# Patient Record
Sex: MALE | Race: BLACK OR AFRICAN AMERICAN | Employment: OTHER | URBAN - METROPOLITAN AREA
[De-identification: names, ages, dates, MRNs, and addresses within clinical notes are randomized per-mention and may not be internally consistent; named-entity substitution may affect disease eponyms.]

---

## 2021-03-19 ENCOUNTER — HOSPITAL ENCOUNTER (INPATIENT)
Age: 71
LOS: 6 days | Discharge: HOME HEALTH | DRG: 871 | End: 2021-03-25
Attending: STUDENT IN AN ORGANIZED HEALTH CARE EDUCATION/TRAINING PROGRAM | Admitting: INTERNAL MEDICINE
Payer: MEDICARE

## 2021-03-19 ENCOUNTER — HOSPITAL ENCOUNTER (EMERGENCY)
Age: 71
Discharge: ANOTHER HOSPITAL | End: 2021-03-19
Attending: EMERGENCY MEDICINE
Payer: MEDICARE

## 2021-03-19 ENCOUNTER — APPOINTMENT (OUTPATIENT)
Dept: GENERAL RADIOLOGY | Age: 71
End: 2021-03-19
Attending: EMERGENCY MEDICINE
Payer: MEDICARE

## 2021-03-19 VITALS
HEART RATE: 79 BPM | SYSTOLIC BLOOD PRESSURE: 175 MMHG | OXYGEN SATURATION: 100 % | TEMPERATURE: 98 F | DIASTOLIC BLOOD PRESSURE: 92 MMHG | RESPIRATION RATE: 26 BRPM

## 2021-03-19 DIAGNOSIS — J18.9 PNEUMONIA OF BOTH LUNGS DUE TO INFECTIOUS ORGANISM, UNSPECIFIED PART OF LUNG: ICD-10-CM

## 2021-03-19 DIAGNOSIS — I42.9 CARDIOMYOPATHY, UNSPECIFIED TYPE (HCC): ICD-10-CM

## 2021-03-19 DIAGNOSIS — R09.02 HYPOXIA: ICD-10-CM

## 2021-03-19 DIAGNOSIS — J96.01 ACUTE RESPIRATORY FAILURE WITH HYPOXIA (HCC): ICD-10-CM

## 2021-03-19 DIAGNOSIS — I50.9 CHRONIC CONGESTIVE HEART FAILURE, UNSPECIFIED HEART FAILURE TYPE (HCC): ICD-10-CM

## 2021-03-19 DIAGNOSIS — I50.9 CONGESTIVE HEART FAILURE, UNSPECIFIED HF CHRONICITY, UNSPECIFIED HEART FAILURE TYPE (HCC): ICD-10-CM

## 2021-03-19 DIAGNOSIS — I50.9 ACUTE ON CHRONIC CONGESTIVE HEART FAILURE, UNSPECIFIED HEART FAILURE TYPE (HCC): Primary | ICD-10-CM

## 2021-03-19 DIAGNOSIS — J81.0 ACUTE PULMONARY EDEMA (HCC): Primary | ICD-10-CM

## 2021-03-19 DIAGNOSIS — J18.9 COMMUNITY ACQUIRED PNEUMONIA, UNSPECIFIED LATERALITY: ICD-10-CM

## 2021-03-19 LAB
ALBUMIN SERPL-MCNC: 3.1 G/DL (ref 3.5–5)
ALBUMIN/GLOB SERPL: 0.6 {RATIO} (ref 1.1–2.2)
ALP SERPL-CCNC: 77 U/L (ref 45–117)
ALT SERPL-CCNC: 18 U/L (ref 12–78)
ANION GAP SERPL CALC-SCNC: 17 MMOL/L (ref 5–15)
APPEARANCE UR: CLEAR
ARTERIAL PATENCY WRIST A: ABNORMAL
AST SERPL W P-5'-P-CCNC: 18 U/L (ref 15–37)
BACTERIA URNS QL MICRO: ABNORMAL /HPF
BASE DEFICIT BLDA-SCNC: 9.9 MMOL/L (ref 0–2)
BASOPHILS # BLD: 0.1 K/UL (ref 0–0.2)
BASOPHILS NFR BLD: 1 % (ref 0–2.5)
BDY SITE: ABNORMAL
BILIRUB SERPL-MCNC: 0.5 MG/DL (ref 0.2–1)
BILIRUB UR QL: NEGATIVE
BNP SERPL-MCNC: ABNORMAL PG/ML
BREATHS.SPONTANEOUS ON VENT: 10
BUN SERPL-MCNC: 32 MG/DL (ref 6–20)
BUN/CREAT SERPL: 15 (ref 12–20)
CA-I BLD-MCNC: 9.2 MG/DL (ref 8.5–10.1)
CHLORIDE SERPL-SCNC: 106 MMOL/L (ref 97–108)
CO2 SERPL-SCNC: 17 MMOL/L (ref 21–32)
COHGB MFR BLD: 0.1 % (ref 0–5)
COLOR UR: ABNORMAL
COVID-19 RAPID TEST, COVR: NOT DETECTED
CREAT SERPL-MCNC: 2.19 MG/DL (ref 0.7–1.3)
EOSINOPHIL # BLD: 0.4 K/UL (ref 0–0.7)
EOSINOPHIL NFR BLD: 3 % (ref 0.9–2.9)
EPAP/CPAP/PEEP, PAPEEP: 10
ERYTHROCYTE [DISTWIDTH] IN BLOOD BY AUTOMATED COUNT: 16.9 % (ref 11.5–14.5)
FIO2 ON VENT: 80 %
GLOBULIN SER CALC-MCNC: 4.9 G/DL (ref 2–4)
GLUCOSE BLD STRIP.AUTO-MCNC: 178 MG/DL (ref 65–100)
GLUCOSE SERPL-MCNC: 297 MG/DL (ref 65–100)
GLUCOSE UR STRIP.AUTO-MCNC: NEGATIVE MG/DL
HCO3 BLDA-SCNC: 15 MMOL/L (ref 22–26)
HCT VFR BLD AUTO: 32.1 % (ref 41–53)
HGB BLD OXIMETRY-MCNC: 10.7 G/DL (ref 13.5–17.5)
HGB BLD-MCNC: 10 G/DL (ref 13.5–17.5)
HGB UR QL STRIP: ABNORMAL
INR PPP: 1.1 (ref 0.9–1.1)
IPAP/PIP, IPAPIP: 18
KETONES UR QL STRIP.AUTO: NEGATIVE MG/DL
LACTATE SERPL-SCNC: 1.4 MMOL/L (ref 0.4–2)
LACTATE SERPL-SCNC: 4.4 MMOL/L (ref 0.4–2)
LEUKOCYTE ESTERASE UR QL STRIP.AUTO: NEGATIVE
LYMPHOCYTES # BLD: 3.6 K/UL (ref 1–4.8)
LYMPHOCYTES NFR BLD: 21 % (ref 20.5–51.1)
MCH RBC QN AUTO: 24.2 PG (ref 31–34)
MCHC RBC AUTO-ENTMCNC: 31 G/DL (ref 31–36)
MCV RBC AUTO: 78.2 FL (ref 80–100)
METHGB MFR BLD: 0.3 % (ref 0–5)
MONOCYTES # BLD: 1.6 K/UL (ref 0.2–2.4)
MONOCYTES NFR BLD: 9 % (ref 1.7–9.3)
NEUTS SEG # BLD: 11.6 K/UL (ref 1.8–7.7)
NEUTS SEG NFR BLD: 66 % (ref 42–75)
NITRITE UR QL STRIP.AUTO: NEGATIVE
NRBC # BLD: 0.03 K/UL
NRBC BLD-RTO: 0.2 PER 100 WBC
OXYHGB MFR BLD: 95.2 % (ref 95–100)
PCO2 BLDA: 29 MMHG (ref 35–45)
PERFORMED BY, TECHID: ABNORMAL
PH BLDA: 7.33 [PH] (ref 7.35–7.45)
PH UR STRIP: 6 [PH] (ref 5–8)
PLATELET # BLD AUTO: 366 K/UL
PMV BLD AUTO: 9 FL (ref 6.5–11.5)
PO2 BLDA: 77 MMHG (ref 70–100)
POTASSIUM SERPL-SCNC: 4 MMOL/L (ref 3.5–5.1)
PRESSURE SUPPORT SETTING VENT: 8 CM[H2O]
PROT SERPL-MCNC: 8 G/DL (ref 6.4–8.2)
PROT UR STRIP-MCNC: 100 MG/DL
PROTHROMBIN TIME: 10.7 SEC (ref 9–11.1)
RBC # BLD AUTO: 4.11 M/UL (ref 4.5–5.9)
RBC #/AREA URNS HPF: ABNORMAL /HPF (ref 0–3)
SAO2% DEVICE SAO2% SENSOR NAME: ABNORMAL
SARS-COV-2, COV2: NORMAL
SODIUM SERPL-SCNC: 140 MMOL/L (ref 136–145)
SP GR UR REFRACTOMETRY: 1.02 (ref 1–1.03)
SPECIMEN SITE: ABNORMAL
SPECIMEN SOURCE: NORMAL
TROPONIN I SERPL-MCNC: <0.05 NG/ML
UROBILINOGEN UR QL STRIP.AUTO: 0.2 EU/DL (ref 0.2–1)
WBC # BLD AUTO: 17.4 K/UL (ref 4.4–11.3)
WBC URNS QL MICRO: ABNORMAL /HPF (ref 0–5)

## 2021-03-19 PROCEDURE — 83880 ASSAY OF NATRIURETIC PEPTIDE: CPT

## 2021-03-19 PROCEDURE — 82803 BLOOD GASES ANY COMBINATION: CPT

## 2021-03-19 PROCEDURE — 96372 THER/PROPH/DIAG INJ SC/IM: CPT

## 2021-03-19 PROCEDURE — 83605 ASSAY OF LACTIC ACID: CPT

## 2021-03-19 PROCEDURE — 74011250636 HC RX REV CODE- 250/636: Performed by: INTERNAL MEDICINE

## 2021-03-19 PROCEDURE — 36415 COLL VENOUS BLD VENIPUNCTURE: CPT

## 2021-03-19 PROCEDURE — 5A09357 ASSISTANCE WITH RESPIRATORY VENTILATION, LESS THAN 24 CONSECUTIVE HOURS, CONTINUOUS POSITIVE AIRWAY PRESSURE: ICD-10-PCS | Performed by: STUDENT IN AN ORGANIZED HEALTH CARE EDUCATION/TRAINING PROGRAM

## 2021-03-19 PROCEDURE — 74011000258 HC RX REV CODE- 258: Performed by: EMERGENCY MEDICINE

## 2021-03-19 PROCEDURE — 80053 COMPREHEN METABOLIC PANEL: CPT

## 2021-03-19 PROCEDURE — 87635 SARS-COV-2 COVID-19 AMP PRB: CPT

## 2021-03-19 PROCEDURE — 99284 EMERGENCY DEPT VISIT MOD MDM: CPT

## 2021-03-19 PROCEDURE — 85610 PROTHROMBIN TIME: CPT

## 2021-03-19 PROCEDURE — 81001 URINALYSIS AUTO W/SCOPE: CPT

## 2021-03-19 PROCEDURE — 96365 THER/PROPH/DIAG IV INF INIT: CPT

## 2021-03-19 PROCEDURE — 84484 ASSAY OF TROPONIN QUANT: CPT

## 2021-03-19 PROCEDURE — 96367 TX/PROPH/DG ADDL SEQ IV INF: CPT

## 2021-03-19 PROCEDURE — 74011250637 HC RX REV CODE- 250/637: Performed by: HOSPITALIST

## 2021-03-19 PROCEDURE — 74011250637 HC RX REV CODE- 250/637: Performed by: EMERGENCY MEDICINE

## 2021-03-19 PROCEDURE — 87040 BLOOD CULTURE FOR BACTERIA: CPT

## 2021-03-19 PROCEDURE — 74011250636 HC RX REV CODE- 250/636: Performed by: EMERGENCY MEDICINE

## 2021-03-19 PROCEDURE — 96375 TX/PRO/DX INJ NEW DRUG ADDON: CPT

## 2021-03-19 PROCEDURE — 74011000258 HC RX REV CODE- 258: Performed by: INTERNAL MEDICINE

## 2021-03-19 PROCEDURE — 65610000006 HC RM INTENSIVE CARE

## 2021-03-19 PROCEDURE — 99285 EMERGENCY DEPT VISIT HI MDM: CPT

## 2021-03-19 PROCEDURE — 85025 COMPLETE CBC W/AUTO DIFF WBC: CPT

## 2021-03-19 PROCEDURE — 93005 ELECTROCARDIOGRAM TRACING: CPT

## 2021-03-19 PROCEDURE — 71045 X-RAY EXAM CHEST 1 VIEW: CPT

## 2021-03-19 PROCEDURE — 82962 GLUCOSE BLOOD TEST: CPT

## 2021-03-19 PROCEDURE — 96374 THER/PROPH/DIAG INJ IV PUSH: CPT

## 2021-03-19 PROCEDURE — 36600 WITHDRAWAL OF ARTERIAL BLOOD: CPT

## 2021-03-19 RX ORDER — FUROSEMIDE 10 MG/ML
80 INJECTION INTRAMUSCULAR; INTRAVENOUS ONCE
Status: COMPLETED | OUTPATIENT
Start: 2021-03-19 | End: 2021-03-19

## 2021-03-19 RX ORDER — LEVOFLOXACIN 5 MG/ML
750 INJECTION, SOLUTION INTRAVENOUS EVERY 24 HOURS
Status: DISCONTINUED | OUTPATIENT
Start: 2021-03-19 | End: 2021-03-19 | Stop reason: DRUGHIGH

## 2021-03-19 RX ORDER — FUROSEMIDE 10 MG/ML
40 INJECTION INTRAMUSCULAR; INTRAVENOUS
Status: COMPLETED | OUTPATIENT
Start: 2021-03-19 | End: 2021-03-19

## 2021-03-19 RX ORDER — LEVOFLOXACIN 5 MG/ML
750 INJECTION, SOLUTION INTRAVENOUS
Status: DISCONTINUED | OUTPATIENT
Start: 2021-03-19 | End: 2021-03-22

## 2021-03-19 RX ORDER — ONDANSETRON 2 MG/ML
4 INJECTION INTRAMUSCULAR; INTRAVENOUS
Status: DISCONTINUED | OUTPATIENT
Start: 2021-03-19 | End: 2021-03-25 | Stop reason: HOSPADM

## 2021-03-19 RX ORDER — FUROSEMIDE 10 MG/ML
80 INJECTION INTRAMUSCULAR; INTRAVENOUS DAILY
Status: DISCONTINUED | OUTPATIENT
Start: 2021-03-20 | End: 2021-03-20

## 2021-03-19 RX ORDER — ENOXAPARIN SODIUM 100 MG/ML
40 INJECTION SUBCUTANEOUS DAILY
Status: DISCONTINUED | OUTPATIENT
Start: 2021-03-19 | End: 2021-03-25 | Stop reason: HOSPADM

## 2021-03-19 RX ORDER — SODIUM CHLORIDE 0.9 % (FLUSH) 0.9 %
5-40 SYRINGE (ML) INJECTION EVERY 8 HOURS
Status: DISCONTINUED | OUTPATIENT
Start: 2021-03-19 | End: 2021-03-25 | Stop reason: HOSPADM

## 2021-03-19 RX ORDER — SODIUM CHLORIDE 0.9 % (FLUSH) 0.9 %
5-40 SYRINGE (ML) INJECTION EVERY 8 HOURS
Status: DISCONTINUED | OUTPATIENT
Start: 2021-03-19 | End: 2021-03-19 | Stop reason: HOSPADM

## 2021-03-19 RX ORDER — AMLODIPINE BESYLATE 10 MG/1
TABLET ORAL DAILY
COMMUNITY
End: 2021-03-25

## 2021-03-19 RX ORDER — LABETALOL 300 MG/1
600 TABLET, FILM COATED ORAL 2 TIMES DAILY
COMMUNITY
End: 2021-03-25

## 2021-03-19 RX ORDER — LISINOPRIL 20 MG/1
20 TABLET ORAL DAILY
COMMUNITY
End: 2021-03-25

## 2021-03-19 RX ORDER — GLIPIZIDE 10 MG/1
20 TABLET, FILM COATED, EXTENDED RELEASE ORAL DAILY
COMMUNITY

## 2021-03-19 RX ORDER — ALLOPURINOL 300 MG/1
TABLET ORAL DAILY
COMMUNITY

## 2021-03-19 RX ORDER — SODIUM CHLORIDE 0.9 % (FLUSH) 0.9 %
5-40 SYRINGE (ML) INJECTION AS NEEDED
Status: DISCONTINUED | OUTPATIENT
Start: 2021-03-19 | End: 2021-03-25 | Stop reason: HOSPADM

## 2021-03-19 RX ORDER — DOCUSATE SODIUM 100 MG/1
100 CAPSULE, LIQUID FILLED ORAL AS NEEDED
Status: DISCONTINUED | OUTPATIENT
Start: 2021-03-19 | End: 2021-03-25 | Stop reason: HOSPADM

## 2021-03-19 RX ORDER — SODIUM CHLORIDE 0.9 % (FLUSH) 0.9 %
5-40 SYRINGE (ML) INJECTION AS NEEDED
Status: DISCONTINUED | OUTPATIENT
Start: 2021-03-19 | End: 2021-03-19 | Stop reason: HOSPADM

## 2021-03-19 RX ORDER — CLONIDINE HYDROCHLORIDE 0.1 MG/1
TABLET ORAL 2 TIMES DAILY
Status: ON HOLD | COMMUNITY
End: 2021-04-09

## 2021-03-19 RX ORDER — ACETAMINOPHEN 325 MG/1
650 TABLET ORAL
Status: DISCONTINUED | OUTPATIENT
Start: 2021-03-19 | End: 2021-03-25 | Stop reason: HOSPADM

## 2021-03-19 RX ADMIN — PIPERACILLIN AND TAZOBACTAM 3.38 G: 3; .375 INJECTION, POWDER, LYOPHILIZED, FOR SOLUTION INTRAVENOUS at 11:24

## 2021-03-19 RX ADMIN — FUROSEMIDE 40 MG: 10 INJECTION, SOLUTION INTRAMUSCULAR; INTRAVENOUS at 11:05

## 2021-03-19 RX ADMIN — Medication 10 ML: at 15:51

## 2021-03-19 RX ADMIN — NITROGLYCERIN 1 INCH: 20 OINTMENT TOPICAL at 11:05

## 2021-03-19 RX ADMIN — LABETALOL HYDROCHLORIDE 300 MG: 200 TABLET, FILM COATED ORAL at 21:26

## 2021-03-19 RX ADMIN — Medication 10 ML: at 21:28

## 2021-03-19 RX ADMIN — FUROSEMIDE 80 MG: 10 INJECTION, SOLUTION INTRAMUSCULAR; INTRAVENOUS at 16:27

## 2021-03-19 RX ADMIN — CEFTRIAXONE SODIUM 1 G: 1 INJECTION, POWDER, FOR SOLUTION INTRAMUSCULAR; INTRAVENOUS at 11:51

## 2021-03-19 RX ADMIN — Medication 10 ML: at 19:54

## 2021-03-19 RX ADMIN — LEVOFLOXACIN 750 MG: 5 INJECTION, SOLUTION INTRAVENOUS at 15:48

## 2021-03-19 RX ADMIN — PIPERACILLIN AND TAZOBACTAM 3.38 G: 3; .375 INJECTION, POWDER, FOR SOLUTION INTRAVENOUS at 19:54

## 2021-03-19 RX ADMIN — ONDANSETRON 4 MG: 2 INJECTION INTRAMUSCULAR; INTRAVENOUS at 19:39

## 2021-03-19 RX ADMIN — ENOXAPARIN SODIUM 40 MG: 100 INJECTION SUBCUTANEOUS at 15:49

## 2021-03-19 NOTE — ED PROVIDER NOTES
EMERGENCY DEPARTMENT HISTORY AND PHYSICAL EXAM      Date: 3/19/2021  Patient Name: Lashell Terry    History of Presenting Illness     Chief Complaint   Patient presents with    Cough    Shortness of Breath       History Provided By: Patient    HPI: Lashell Terry, 80 y.o. male with a past medical history significant hypertension and CHF presents to the ED with cc of cough, shortness of breath. Patient states over the last 2 to 3 days has felt worsening cough, shortness of breath, patient presented to [facility] emergency department this morning for worsening shortness of breath, was found to be hypoxic by EMS in the 70s. On nonrebreather oxygenation mildly improved however patient remained tachypneic. On arrival patient treated for CHF exacerbation, placed on BiPAP, given Lasix, given 1 inch of Nitropaste. Patient's respiratory status improved was able to transition to high flow. Chest x-ray was concerning for bilateral infiltrates, lab work significant for elevated white count at 17,000. Patient additionally treated for pneumonia, given 1 dose of Zosyn. Transferred to [address] [place] [address] for admission. On my evaluation patient awake alert, complaining of shortness of breath for last 2 days however states currently his shortness of breath has greatly improved. Is comfortable on high flow, denies any chest pain, denies any abdominal pain nausea vomiting. There are no other complaints, changes, or physical findings at this time.     PCP: Jayden Stanley MD    Current Facility-Administered Medications on File Prior to Encounter   Medication Dose Route Frequency Provider Last Rate Last Admin    [COMPLETED] furosemide (LASIX) injection 40 mg  40 mg IntraVENous NOW Lissette Lazo MD   40 mg at 03/19/21 1105    [COMPLETED] nitroglycerin (NITROBID) 2 % ointment 1 Inch  1 Inch Topical NOW Lissette Lazo MD   1 Inch at 03/19/21 1105    [COMPLETED] piperacillin-tazobactam (ZOSYN) 3.375 g in 0.9% sodium chloride (MBP/ADV) 100 mL MBP  3.375 g IntraVENous NOW Feliciano Cazares MD   Stopped at 03/19/21 1143    [COMPLETED] cefTRIAXone (ROCEPHIN) 1 g in 0.9% sodium chloride (MBP/ADV) 50 mL MBP  1 g IntraVENous ONCE Jane Beckett MD   Stopped at 03/19/21 1230    [DISCONTINUED] sodium chloride (NS) flush 5-40 mL  5-40 mL IntraVENous Q8H Feliciano Cazares MD        [DISCONTINUED] sodium chloride (NS) flush 5-40 mL  5-40 mL IntraVENous PRN Feliciano Cazares MD        [DISCONTINUED] cefTRIAXone (ROCEPHIN) 1 g in 0.9% sodium chloride (MBP/ADV) 50 mL MBP  1 g IntraVENous NOW Feliciano Cazares MD         No current outpatient medications on file prior to encounter. Past History     Past Medical History:  Past Medical History:   Diagnosis Date    Heart failure ([facility] [address]     Hypertension        Past Surgical History:  No past surgical history on file. Family History:  No family history on file. Social History:  Social History     Tobacco Use    Smoking status: Not on file   Substance Use Topics    Alcohol use: Not on file    Drug use: Not on file       Allergies:  No Known Allergies      Review of Systems     Review of Systems   Constitutional: Negative for activity change, appetite change, chills and fever. HENT: Negative for congestion, sore throat and trouble swallowing. Eyes: Negative for photophobia and visual disturbance. Respiratory: Positive for cough and shortness of breath. Negative for chest tightness. Cardiovascular: Positive for leg swelling. Negative for chest pain and palpitations. Gastrointestinal: Negative for abdominal pain and nausea. Genitourinary: Negative for dysuria and flank pain. Musculoskeletal: Negative for arthralgias and neck pain. Skin: Negative for color change and pallor. Neurological: Negative for dizziness, weakness, numbness and headaches. Physical Exam     Physical Exam  Vitals signs and nursing note reviewed.    Constitutional:       Appearance: Normal appearance. He is normal weight. HENT:      Head: Normocephalic and atraumatic. Nose: Nose normal.      Mouth/Throat:      Mouth: Mucous membranes are moist.   Eyes:      Extraocular Movements: Extraocular movements intact. Pupils: Pupils are equal, round, and reactive to light. Neck:      Musculoskeletal: Normal range of motion and neck supple. Cardiovascular:      Rate and Rhythm: Normal rate and regular rhythm. Pulses: Normal pulses. Heart sounds: Normal heart sounds. Pulmonary:      Breath sounds: Examination of the right-upper field reveals rhonchi. Examination of the left-upper field reveals rhonchi. Examination of the right-middle field reveals rhonchi. Examination of the left-middle field reveals rhonchi. Examination of the right-lower field reveals rhonchi. Examination of the left-lower field reveals rhonchi. Rhonchi present. Abdominal:      General: Abdomen is flat. Bowel sounds are normal.      Palpations: Abdomen is soft. Musculoskeletal: Normal range of motion. General: No swelling or tenderness. Right lower leg: Edema present. Left lower leg: Edema present. Skin:     General: Skin is warm and dry. Capillary Refill: Capillary refill takes less than 2 seconds. Neurological:      General: No focal deficit present. Mental Status: He is alert and oriented to person, place, and time. Cranial Nerves: No cranial nerve deficit. Sensory: No sensory deficit. Motor: No weakness.    Psychiatric:         Mood and Affect: Mood normal.         Behavior: Behavior normal.         Diagnostic Study Results     Labs -     Recent Results (from the past 12 hour(s))   METABOLIC PANEL, COMPREHENSIVE    Collection Time: 03/19/21 11:00 AM   Result Value Ref Range    Sodium 140 136 - 145 mmol/L    Potassium 4.0 3.5 - 5.1 mmol/L    Chloride 106 97 - 108 mmol/L    CO2 17 (L) 21 - 32 mmol/L    Anion gap 17 (H) 5 - 15 mmol/L    Glucose 297 (H) 65 - 100 mg/dL    BUN 32 (H) 6 - 20 mg/dL    Creatinine 2.19 (H) 0.70 - 1.30 mg/dL    BUN/Creatinine ratio 15 12 - 20      GFR est AA 36 (L) >60 ml/min/1.73m2    GFR est non-AA 30 (L) >60 ml/min/1.73m2    Calcium 9.2 8.5 - 10.1 mg/dL    Bilirubin, total 0.5 0.2 - 1.0 mg/dL    AST (SGOT) 18 15 - 37 U/L    ALT (SGPT) 18 12 - 78 U/L    Alk. phosphatase 77 45 - 117 U/L    Protein, total 8.0 6.4 - 8.2 g/dL    Albumin 3.1 (L) 3.5 - 5.0 g/dL    Globulin 4.9 (H) 2.0 - 4.0 g/dL    A-G Ratio 0.6 (L) 1.1 - 2.2     CBC WITH AUTOMATED DIFF    Collection Time: 03/19/21 11:00 AM   Result Value Ref Range    WBC 17.4 (H) 4.4 - 11.3 K/uL    RBC 4.11 (L) 4.50 - 5.90 M/uL    HGB 10.0 (L) 13.5 - 17.5 g/dL    HCT 32.1 (L) 41 - 53 %    MCV 78.2 (L) 80 - 100 FL    MCH 24.2 (L) 31 - 34 PG    MCHC 31.0 31.0 - 36.0 g/dL    RDW 16.9 (H) 11.5 - 14.5 %    PLATELET 501 K/uL    MPV 9.0 6.5 - 11.5 FL    NRBC 0.2  WBC    ABSOLUTE NRBC 0.03 K/uL    NEUTROPHILS 66 42 - 75 %    LYMPHOCYTES 21 20.5 - 51.1 %    MONOCYTES 9 1.7 - 9.3 %    EOSINOPHILS 3 (H) 0.9 - 2.9 %    BASOPHILS 1 0.0 - 2.5 %    ABS. NEUTROPHILS 11.6 (H) 1.8 - 7.7 K/UL    ABS. LYMPHOCYTES 3.6 1.0 - 4.8 K/UL    ABS. MONOCYTES 1.6 0.2 - 2.4 K/UL    ABS. EOSINOPHILS 0.4 0.0 - 0.7 K/UL    ABS.  BASOPHILS 0.1 0.0 - 0.2 K/UL   TROPONIN I    Collection Time: 03/19/21 11:00 AM   Result Value Ref Range    Troponin-I, Qt. <0.05 <0.05 ng/mL   PROTHROMBIN TIME + INR    Collection Time: 03/19/21 11:00 AM   Result Value Ref Range    Prothrombin time 10.7 9.0 - 11.1 sec    INR 1.1 0.9 - 1.1     LACTIC ACID    Collection Time: 03/19/21 11:00 AM   Result Value Ref Range    Lactic acid 4.4 (HH) 0.4 - 2.0 mmol/L   BLOOD GAS, ARTERIAL    Collection Time: 03/19/21 11:14 AM   Result Value Ref Range    pH 7.33 (L) 7.35 - 7.45      PCO2 29 (L) 35 - 45 mmHg    PO2 77 70 - 100 mmHg    BICARBONATE 15 (L) 22 - 26 mmol/L    BASE DEFICIT 9.9 (H) 0 - 2 mmol/L    O2 METHOD BiPAP      FIO2 80.0 %    SPONTANEOUS RATE 10      PRESSURE SUPPORT 8      IPAP/PIP 18      EPAP/CPAP/PEEP 10      Sample source Arterial      SITE Left Radial      PAVEL'S TEST PASS      Carboxy-Hgb 0.1 0 - 5 %    Methemoglobin 0.3 0 - 5 %    tHb 10.7 (L) 13.5 - 17.5 g/dL    Oxyhemoglobin 95.2 95 - 100 %   SARS-COV-2    Collection Time: 03/19/21 11:32 AM   Result Value Ref Range    SARS-CoV-2 Nasopharyngeal     COVID-19 RAPID TEST    Collection Time: 03/19/21 11:32 AM   Result Value Ref Range    Specimen source Nasopharyngeal      COVID-19 rapid test Not Detected Not Detected     URINALYSIS W/ RFLX MICROSCOPIC    Collection Time: 03/19/21 11:45 AM   Result Value Ref Range    Color Yellow/Straw      Appearance Clear Clear      Specific gravity 1.025 1.003 - 1.030      pH (UA) 6.0 5.0 - 8.0      Protein 100 (A) Negative mg/dL    Glucose Negative Negative mg/dL    Ketone Negative Negative mg/dL    Bilirubin Negative Negative      Blood Small (A) Negative      Urobilinogen 0.2 0.2 - 1.0 EU/dL    Nitrites Negative Negative      Leukocyte Esterase Negative Negative     URINE MICROSCOPIC    Collection Time: 03/19/21 11:45 AM   Result Value Ref Range    WBC 0-4 0 - 5 /hpf    RBC 5-10 0 - 3 /hpf    Bacteria 1+ (A) Negative /hpf       Radiologic Studies -   @lastxrresult@  CT Results  (Last 48 hours)    None        CXR Results  (Last 48 hours)               03/19/21 1059  XR CHEST PORT Final result    Narrative:  Chest single view. Comparison single view chest May 1, 2020. Dense airspace opacity throughout the mid and lower lungs, new finding compared   to prior imaging. Suspect severe bilateral pneumonia. Cardiac silhouette   enlargement. No pneumothorax or sizable pleural effusion. Medical Decision Making   I am the first provider for this patient. I reviewed the vital signs, available nursing notes, past medical history, past surgical history, family history and social history.     Vital Signs-Reviewed the patient's vital signs. Patient Vitals for the past 12 hrs:   Temp Pulse Resp BP SpO2   03/19/21 1407 98 °F (36.7 °C) (!) 101 17 (!) 186/108 94 %       Records Reviewed: Nursing Notes and Old Medical Records    The patient presents with cough, shortness of breath with a differential diagnosis of CHF exacerbation, ACS, pneumonia      Provider Notes (Medical Decision Making):     MDM     71-year-old male, presents to emergency department as a transfer from [facility] for CHF exacerbation and bilateral pneumonia, requiring high flow for respiratory failure. Currently patient awake alert, in no distress, saturating 94% on high flow. Physical exam shows afebrile male, hypertensive 186/108, bilateral rhonch. Chart reviewed including lab work and x-ray, lab work significant for leukocytosis 17,000, elevated lactate 4.4, acute renal insufficiency creatinine 2, no old labs to compare. Patient received Lasix, Nitropaste, Zosyn at [facility]. Will redraw lactic acid, draw a BNP, admit patient for CHF exacerbation, pneumonia, respiratory failure. Discussed case with Dr. Arron Barclay, will accept admission. ED Course:   Initial assessment performed. The patients presenting problems have been discussed, and they are in agreement with the care plan formulated and outlined with them. I have encouraged them to ask questions as they arise throughout their visit. Return to ED if worse     Diagnosis     Clinical Impression:   1. Acute on chronic congestive heart failure, unspecified heart failure type ([facility] [address]    2. Pneumonia of both lungs due to infectious organism, unspecified part of lung    3.  Hypoxia

## 2021-03-19 NOTE — CONSULTS
Consult  Pulmonary, Critical Care    Name: Maninder Stanley MRN: [id number]   : 1950 Hospital: [address] [place] [address]   Date: 3/19/2021  Admission date: 3/19/2021 Hospital Day: 1       Subjective/Interval History:   Seen in the emergency room on nasal high flow oxygen at 80% with oxygen saturation 93% mildly labored respirations. He states that over the last 2 weeks he has been having worsening swelling of his ankles and worsening shortness of breath. Denies fever chills sweats has had some cough nonproductive. He went to the emergency room found to have severe diffuse pulmonary infiltrates was given 1 dose of Lasix placed on BiPAP and transferred to our facility. Of interest he states that 10 or 15 years ago he was hospitalized at [facility] with congestive heart failure had cardiac catheterization with no blockages and was told that medication would control his problem        IMPRESSION:   1. Acute hypoxic respiratory failure  2. Pulmonary edema  3. Diffuse pulmonary infiltrates possible community-acquired pneumonia but I think pulmonary edema  4. Hypertension poorly controlled  5. Acute kidney injury  6. Edema  7. Cardiomyopathy  8. Body mass index is 27.89 kg/m². 9.       RECOMMENDATIONS/PLAN:   1. Has received 1 dose of Lasix at [facility] we will repeat it this evening and place on daily dose  2. Await results of echocardiogram  3. Follow renal function  4. Supplemental O2 to keep sats > 93%  5. [x] High complexity decision making was performed  [x] See my orders for details      Subjective/Initial History:     I was asked by No admitting provider for patient encounter. to see Maninder Other  a 80 y.o.    male in consultation for a chief complaint of acute hypoxic respiratory failure pulmonary edema    E  No Known Allergies     MAR reviewed and pertinent medications noted or modified as needed     Current Facility-Administered Medications   Medication    sodium chloride (NS) flush 5-40 mL    sodium chloride (NS) flush 5-40 mL    acetaminophen (TYLENOL) tablet 650 mg    enoxaparin (LOVENOX) injection 40 mg    piperacillin-tazobactam (ZOSYN) 3.375 g in 0.9% sodium chloride (MBP/ADV) 100 mL MBP    ondansetron (ZOFRAN) injection 4 mg    docusate sodium (COLACE) capsule 100 mg    levoFLOXacin (LEVAQUIN) 750 mg in D5W IVPB    furosemide (LASIX) injection 80 mg    [START ON 3/20/2021] furosemide (LASIX) injection 80 mg     Current Outpatient Medications   Medication Sig    labetaloL (NORMODYNE) 300 mg tablet Take 600 mg by mouth two (2) times a day.  cloNIDine HCL (CATAPRES) 0.1 mg tablet Take  by mouth two (2) times a day.  amLODIPine (NORVASC) 10 mg tablet Take  by mouth daily.  allopurinoL (ZYLOPRIM) 300 mg tablet Take  by mouth daily.  glipiZIDE SR (GLUCOTROL XL) 10 mg CR tablet Take 20 mg by mouth daily.  lisinopriL (PRINIVIL, ZESTRIL) 20 mg tablet Take 20 mg by mouth daily. Patient PCP: Other, MD Jayden  PMH:  has a past medical history of CKD (chronic kidney disease) stage 3, GFR 30-59 ml/min, Diabetes ([address] Heart failure ([facility] [address] and Hypertension. PSH:   has no past surgical history on file. FHX: family history is not on file. SHX:  reports that he has never smoked. He has never used smokeless tobacco. He reports previous alcohol use. Systemic review  General states his weight stable has not noted chronic fever chills or sweats at home  Eyes no double vision or momentary blindness  ENT no sinus congestion drainage or facial pain  Endocrinologic no polyuria polydipsia  Musculoskeletal no swollen tender joints  Neurologic no seizures or syncope  Gastrointestinal no nausea vomiting acid indigestion sour taste  Genitourinary no pain or discomfort on urination no bleeding  Cardiovascular has worsening edema over the last 2 weeks with shortness of breath and nonproductive cough.   Gives a history of having been hospitalized at [facility] 10 or 15 years ago with heart congestion and was started on medications  Respiratory increased shortness of breath nonproductive cough no fever chills or sweats    Objective:     Vital Signs: Telemetry:    normal sinus rhythm Intake/Output:   Visit Vitals  BP (!) 199/93   Pulse 95   Temp 98 °F (36.7 °C)   Resp 17   Ht 5' 11\" (1.803 m)   Wt 90.7 kg (200 lb)   SpO2 93%   BMI 27.89 kg/m²       Temp (24hrs), Av °F (36.7 °C), Min:98 °F (36.7 °C), Max:98 °F (36.7 °C)        O2 Device: Hi flow nasal cannula O2 Flow Rate (L/min): 50 l/min       Wt Readings from Last 4 Encounters:   21 90.7 kg (200 lb)        No intake or output data in the 24 hours ending 21 1636    Last shift:      No intake/output data recorded. Last 3 shifts: No intake/output data recorded. Physical Exam:   General:  male; moderate distress lying in bed with nasal high flow going has mild accessory muscle usage of respirations  HEENT: NCAT, oral mucosa clear  Eyes: anicteric; conjunctiva clear extraocular movements intact  Neck: no nodes, JVD present mild accessory muscle usage  Chest: no deformity,   Cardiac: Distant heart sounds seem regular no definite murmur does have ankle and sacral edema  Lungs: Clear anteriorly with posterior rales extending three fourths of the way up  Abd: Soft positive bowel sounds no tenderness  Ext: Ankle edema extending up the calves sacral edema; no joint swelling;  No clubbing  : clear urine  Neuro: Awake alert oriented moves all 4 extremities  Psych- no agitation, oriented to person;   Skin: warm, dry, no cyanosis;  Pulses: Radial radial femoral pulses intact  Capillary: Normal capillary refill    Labs:    Recent Labs     21  1100   WBC 17.4*   HGB 10.0*      INR 1.1     Recent Labs     21  1430 21  1100   NA  --  140   K  --  4.0   CL  --  106   CO2  --  17*   GLU  --  297*   BUN  --  32*   CREA  --  2.19*   CA  --  9.2   LAC 1.4 4.4*   ALB  --  3.1*   ALT  --  18 Recent Labs     03/19/21  1114   PH 7.33*   PCO2 29*   PO2 77   HCO3 15*   FIO2 80.0     Recent Labs     03/19/21  1100   TROIQ <0.05     BNP 10,641  COVID-19 antigen negative  Imaging:    CXR Results  (Last 48 hours)               03/19/21 1059  XR CHEST PORT Final result    Narrative:  Chest single view. Comparison single view chest May 1, 2020. Dense airspace opacity throughout the mid and lower lungs, new finding compared   to prior imaging. Suspect severe bilateral pneumonia. Cardiac silhouette   enlargement. No pneumothorax or sizable pleural effusion. To me it looks like severe pulmonary edema           Results from Hospital Encounter encounter on 03/19/21   XR CHEST PORT    Narrative Chest single view. Comparison single view chest May 1, 2020. Dense airspace opacity throughout the mid and lower lungs, new finding compared  to prior imaging. Suspect severe bilateral pneumonia. Cardiac silhouette  enlargement. No pneumothorax or sizable pleural effusion. He gives a history of heart disease in the past with what sounds like congestive heart failure and now has diffuse severe pulmonary infiltrates and hypoxia with no fever chills or sweats sounds more like pulmonary edema I will give extra dose Lasix. He does however have leukocytosis of a significant amount so agree with your empiric antibiotic for the time being.   Echocardiogram has been ordered  Artist MD Jose A

## 2021-03-19 NOTE — ED TRIAGE NOTES
Pt was transferred from [facility] for SOB. Pt went to the ER for SOB. At [facility] he was 60% on RA. Tried NC and NRB but saturation would not go higher than 80%. Pt was placed on BiPAP at FiO2 80% and O2 saturation of 100%. Pt now placed on high flow O2 here with saturation of 95%.

## 2021-03-19 NOTE — ROUTINE PROCESS
Patient received in rm 263 at approx 1847. Patient on Hi Flow 50L at 80% FiO2. Patient has a 20g IV in the RAC & LAC, patent, no complications. Four eyes skin assessment performed with Jack Calvin RN. Patient's skin is intact. Large skin tag on the right flank. Patient belongings (jeans, brown belt, black longjohns, and wallet) placed in patient belongings bag then placed in closet. Offered to have wallet locked away with security. Patient would like to keep wallet in the room with him for now so he has access to it. Bedside shift report given to Jack Calvin RN.

## 2021-03-19 NOTE — ED TRIAGE NOTES
.  Chief Complaint   Patient presents with    Respiratory Distress     pt presents POV with respiratory distress. Pt has severe tachypna, 69% on room air, pt afebrile. A&Ox4. Pt difficulty speaking per family has pmh of CHF.

## 2021-03-19 NOTE — ED NOTES
Blood cultures drawn from pt at this time, pt tolerated well.  Blood cultures  and placed in specimen bag and sent to lab

## 2021-03-19 NOTE — ROUTINE PROCESS
TRANSFER - OUT REPORT: 
 
Verbal report given to Jacquie (name) on Pilar Lan  being transferred to [facility](unit) for routine progression of care Report consisted of patients Situation, Background, Assessment and  
Recommendations(SBAR). Information from the following report(s) SBAR, ED Summary, Intake/Output, MAR and Recent Results was reviewed with the receiving nurse. Lines:    
 
Opportunity for questions and clarification was provided. Patient transported with: 
 [facility]

## 2021-03-19 NOTE — CONSULTS
Cardiology Consult    NAME: Candelaria Lei   :  1950   MRN:  [id number]     Date/Time:  3/19/2021 4:38 PM    Patient PCP: Nkechi Paz MD      Subjective:   CHIEF COMPLAINT: Shortness of breath      REASON FOR CONSULT: Possible CHF      HISTORY OF PRESENT ILLNESS:     Candelaria Lei is a 80 y.o. BLACK/ male who has a history of hypertension, diabetes, chronic kidney disease, tobacco use, and chronic anemia. He has CHF secondary to diastolic dysfunction and presents with 1 to 2 weeks of increasing shortness of breath, cough, and exertional dyspnea. Patient says he has been coughing and feeling winded. He saw his primary physician and says he was told he may need antibiotics. He was not using any inhalers at home. Over the past 24 hours, his breathing is gotten worse so he went to [facility] emergency room and was subsequently transferred here secondary to hypoxemia. EMS found him hypoxic with saturations in the 70s. On a nonrebreather his oxygenation improved. On arrival he was started on BiPAP and given IV furosemide after which she was able to be weaned to high flow oxygen. Chest x-ray showed bilateral infiltrates. White count was 17,000. On IV antibiotics. No chest pain, no fever, no chills, no Covid contacts. Chest x-ray reviewed and shows bilateral dense pulmonary infiltrates. Past Medical History:   Diagnosis Date    CKD (chronic kidney disease) stage 3, GFR 30-59 ml/min     Diabetes ([facility] [address] [facility]     Heart failure ([facility] [address] [facility]     Hypertension       History reviewed. No pertinent surgical history. 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 No pertinent family history.     REVIEW OF SYSTEMS:     []         Unable to obtain  ROS due to ---   [x] Total of 12 systems reviewed as follows:    Constitutional: negative fever, negative chills,  Eyes:   negative visual changes  ENT:   negative sore throat, tongue or lip swelling  Respiratory:  + cough, negative dyspnea  Cards:  + palpitations  GI:   negative for nausea, vomiting  Genitourinary: negative for frequency, dysuria  Integument:  negative for rash   Hematologic:  negative for easy bruising and gum/nose bleeding  Musculoskel: negative for myalgias, back pain  Neurological:  + dizziness, weakness      Objective:      Physical Exam: dyspneic  Last 24hrs VS reviewed since prior progress note. Most recent are:    Visit Vitals  BP (!) 199/93   Pulse 95   Temp 98 °F (36.7 °C)   Resp 17   Ht 5' 11\" (1.803 m)   Wt 90.7 kg (200 lb)   SpO2 93%   BMI 27.89 kg/m²     No intake or output data in the 24 hours ending 03/19/21 1638     General Appearance: Mild respiratory distress high flow oxygen. Ears/Nose/Mouth/Throat: Pupils equal and round, Hearing grossly normal.  Neck: Supple. No JVP. Good carotids upstrokes, no bruit. Chest: Poor air movement bilaterally with dullness at the bases  Cardiovascular: Regular rate and rhythm, S1S2 normal, soft systolic murmur  Abdomen: Soft, non-tender, bowel sounds are active. Extremities: No edema bilaterally. Femoral pulses +2, Distal Pulses +1. Skin: Warm and dry. Neuro: Alert and oriented x 3, normal speech; follows simple commands  Psychiatric: Cooperative, normal affect and judgment      Data:      Telemetry: Tachycardia with occasional PVCs    EKG: Sinus rhythm with occasional PVCs; no ischemic changes    []  No new EKG for review. Prior to Admission medications    Medication Sig Start Date End Date Taking? Authorizing Provider   labetaloL (NORMODYNE) 300 mg tablet Take 600 mg by mouth two (2) times a day. Yes Provider, Historical   cloNIDine HCL (CATAPRES) 0.1 mg tablet Take  by mouth two (2) times a day.    Yes Provider, Historical   amLODIPine (NORVASC) 10 mg tablet Take  by mouth daily. Yes Provider, Historical   allopurinoL (ZYLOPRIM) 300 mg tablet Take  by mouth daily. Yes Provider, Historical   glipiZIDE SR (GLUCOTROL XL) 10 mg CR tablet Take 20 mg by mouth daily. Yes Provider, Historical   lisinopriL (PRINIVIL, ZESTRIL) 20 mg tablet Take 20 mg by mouth daily. Yes Provider, Historical       Recent Results (from the past 24 hour(s))   METABOLIC PANEL, COMPREHENSIVE    Collection Time: 03/19/21 11:00 AM   Result Value Ref Range    Sodium 140 136 - 145 mmol/L    Potassium 4.0 3.5 - 5.1 mmol/L    Chloride 106 97 - 108 mmol/L    CO2 17 (L) 21 - 32 mmol/L    Anion gap 17 (H) 5 - 15 mmol/L    Glucose 297 (H) 65 - 100 mg/dL    BUN 32 (H) 6 - 20 mg/dL    Creatinine 2.19 (H) 0.70 - 1.30 mg/dL    BUN/Creatinine ratio 15 12 - 20      GFR est AA 36 (L) >60 ml/min/1.73m2    GFR est non-AA 30 (L) >60 ml/min/1.73m2    Calcium 9.2 8.5 - 10.1 mg/dL    Bilirubin, total 0.5 0.2 - 1.0 mg/dL    AST (SGOT) 18 15 - 37 U/L    ALT (SGPT) 18 12 - 78 U/L    Alk. phosphatase 77 45 - 117 U/L    Protein, total 8.0 6.4 - 8.2 g/dL    Albumin 3.1 (L) 3.5 - 5.0 g/dL    Globulin 4.9 (H) 2.0 - 4.0 g/dL    A-G Ratio 0.6 (L) 1.1 - 2.2     CBC WITH AUTOMATED DIFF    Collection Time: 03/19/21 11:00 AM   Result Value Ref Range    WBC 17.4 (H) 4.4 - 11.3 K/uL    RBC 4.11 (L) 4.50 - 5.90 M/uL    HGB 10.0 (L) 13.5 - 17.5 g/dL    HCT 32.1 (L) 41 - 53 %    MCV 78.2 (L) 80 - 100 FL    MCH 24.2 (L) 31 - 34 PG    MCHC 31.0 31.0 - 36.0 g/dL    RDW 16.9 (H) 11.5 - 14.5 %    PLATELET 783 K/uL    MPV 9.0 6.5 - 11.5 FL    NRBC 0.2  WBC    ABSOLUTE NRBC 0.03 K/uL    NEUTROPHILS 66 42 - 75 %    LYMPHOCYTES 21 20.5 - 51.1 %    MONOCYTES 9 1.7 - 9.3 %    EOSINOPHILS 3 (H) 0.9 - 2.9 %    BASOPHILS 1 0.0 - 2.5 %    ABS. NEUTROPHILS 11.6 (H) 1.8 - 7.7 K/UL    ABS. LYMPHOCYTES 3.6 1.0 - 4.8 K/UL    ABS. MONOCYTES 1.6 0.2 - 2.4 K/UL    ABS. EOSINOPHILS 0.4 0.0 - 0.7 K/UL    ABS.  BASOPHILS 0.1 0.0 - 0.2 K/UL TROPONIN I    Collection Time: 03/19/21 11:00 AM   Result Value Ref Range    Troponin-I, Qt. <0.05 <0.05 ng/mL   PROTHROMBIN TIME + INR    Collection Time: 03/19/21 11:00 AM   Result Value Ref Range    Prothrombin time 10.7 9.0 - 11.1 sec    INR 1.1 0.9 - 1.1     LACTIC ACID    Collection Time: 03/19/21 11:00 AM   Result Value Ref Range    Lactic acid 4.4 (HH) 0.4 - 2.0 mmol/L   BLOOD GAS, ARTERIAL    Collection Time: 03/19/21 11:14 AM   Result Value Ref Range    pH 7.33 (L) 7.35 - 7.45      PCO2 29 (L) 35 - 45 mmHg    PO2 77 70 - 100 mmHg    BICARBONATE 15 (L) 22 - 26 mmol/L    BASE DEFICIT 9.9 (H) 0 - 2 mmol/L    O2 METHOD BiPAP      FIO2 80.0 %    SPONTANEOUS RATE 10      PRESSURE SUPPORT 8      IPAP/PIP 18      EPAP/CPAP/PEEP 10      Sample source Arterial      SITE Left Radial      PAVEL'S TEST PASS      Carboxy-Hgb 0.1 0 - 5 %    Methemoglobin 0.3 0 - 5 %    tHb 10.7 (L) 13.5 - 17.5 g/dL    Oxyhemoglobin 95.2 95 - 100 %   SARS-COV-2    Collection Time: 03/19/21 11:32 AM   Result Value Ref Range    SARS-CoV-2 Nasopharyngeal     COVID-19 RAPID TEST    Collection Time: 03/19/21 11:32 AM   Result Value Ref Range    Specimen source Nasopharyngeal      COVID-19 rapid test Not Detected Not Detected     URINALYSIS W/ RFLX MICROSCOPIC    Collection Time: 03/19/21 11:45 AM   Result Value Ref Range    Color Yellow/Straw      Appearance Clear Clear      Specific gravity 1.025 1.003 - 1.030      pH (UA) 6.0 5.0 - 8.0      Protein 100 (A) Negative mg/dL    Glucose Negative Negative mg/dL    Ketone Negative Negative mg/dL    Bilirubin Negative Negative      Blood Small (A) Negative      Urobilinogen 0.2 0.2 - 1.0 EU/dL    Nitrites Negative Negative      Leukocyte Esterase Negative Negative     URINE MICROSCOPIC    Collection Time: 03/19/21 11:45 AM   Result Value Ref Range    WBC 0-4 0 - 5 /hpf    RBC 5-10 0 - 3 /hpf    Bacteria 1+ (A) Negative /hpf   BNP    Collection Time: 03/19/21  2:30 PM   Result Value Ref Range    NT pro-BNP 10,641 (H) <125 pg/mL   LACTIC ACID    Collection Time: 03/19/21  2:30 PM   Result Value Ref Range    Lactic acid 1.4 0.4 - 2.0 mmol/L        _______________________________________________________________________     Assessment:   Acute respiratory failure with bilateral pulmonary infiltrates  Sinus tachycardia likely related to above  Acute CHF decompensation, suspected diastolic dysfunction  Type 2 diabetes  Chronic kidney disease  Essential hypertension  Chronic anemia      Plan:   Echocardiogram to assess EF and filling pressures  Continue to treat for suspected pneumonia/?sepsis  Serial cardiac enzymes and EKG  Records from [facility] reviewed and this patient has normal EF with diastolic dysfunction; no documented CAD      [x]        High complexity decision making was performed    Eunice Laboy MD

## 2021-03-19 NOTE — ED PROVIDER NOTES
EMERGENCY DEPARTMENT HISTORY AND PHYSICAL EXAM      Date: 3/19/2021  Patient Name: Mayela Grullon      History of Presenting Illness     Chief Complaint   Patient presents with    Respiratory Distress       History Provided By: Patient and EMS    HPI: Mayela Grullon, 80 y.o. male with a past medical history significant Congestive heart failure presents to the ED with cc of shortness of breath. Both ED by EMS in respiratory extremis oxygen saturation in the upper 70s on 100% O2 facemask severely dyspneic respiratory rate approximately 40. Minimal history available states he felt sick yesterday    There are no other complaints, changes, or physical findings at this time. PCP: Jayden Stanley MD    Current Facility-Administered Medications   Medication Dose Route Frequency Provider Last Rate Last Admin    sodium chloride (NS) flush 5-40 mL  5-40 mL IntraVENous Q8H Inessa Palma MD        sodium chloride (NS) flush 5-40 mL  5-40 mL IntraVENous PRN Inessa Palma MD        piperacillin-tazobactam (ZOSYN) 3.375 g in 0.9% sodium chloride (MBP/ADV) 100 mL MBP  3.375 g IntraVENous NOW Inessa Palma MD           Past History     Past Medical History:  No past medical history on file. Past Surgical History:  No past surgical history on file. Family History:  No family history on file. Social History:  Social History     Tobacco Use    Smoking status: Not on file   Substance Use Topics    Alcohol use: Not on file    Drug use: Not on file       Allergies:  No Known Allergies      Review of Systems   Review of systems is not obtainable due to respiratory distress  Review of Systems    Physical Exam   Elderly male in marked respiratory distress diaphoretic hypertensive tachypneic  Physical Exam  Vitals signs and nursing note reviewed. Constitutional:       General: He is in acute distress. Appearance: He is ill-appearing and diaphoretic. He is not toxic-appearing.    HENT:      Head: Normocephalic and atraumatic. Nose: Nose normal.      Mouth/Throat:      Mouth: Mucous membranes are moist.   Eyes:      Extraocular Movements: Extraocular movements intact. Conjunctiva/sclera: Conjunctivae normal.      Pupils: Pupils are equal, round, and reactive to light. Neck:      Musculoskeletal: Normal range of motion and neck supple. No neck rigidity or muscular tenderness. Vascular: No carotid bruit. Cardiovascular:      Rate and Rhythm: Regular rhythm. Tachycardia present. Pulses: Normal pulses. Heart sounds: Normal heart sounds. Pulmonary:      Effort: Respiratory distress present. Breath sounds: Rales present. No wheezing or rhonchi. Comments: Rales throughout bilaterally  Abdominal:      General: Abdomen is flat. There is no distension. Palpations: Abdomen is soft. There is no mass. Tenderness: There is no abdominal tenderness. There is no right CVA tenderness, left CVA tenderness, guarding or rebound. Hernia: No hernia is present. Musculoskeletal: Normal range of motion. Right lower leg: Edema present. Left lower leg: Edema present. Comments: Approximately 2+ edema lower extremities bilaterally appears chronic   Skin:     General: Skin is warm. Neurological:      General: No focal deficit present. Mental Status: He is alert and oriented to person, place, and time. Mental status is at baseline. Psychiatric:         Mood and Affect: Mood normal.         Thought Content: Thought content normal.         Lab and Diagnostic Study Results     Labs -   No results found for this or any previous visit (from the past 12 hour(s)). Radiologic Studies -   [unfilled]  CT Results  (Last 48 hours)    None        CXR Results  (Last 48 hours)               03/19/21 1059  XR CHEST PORT Final result    Narrative:  Chest single view. Comparison single view chest May 1, 2020.        Dense airspace opacity throughout the mid and lower lungs, new finding compared   to prior imaging. Suspect severe bilateral pneumonia. Cardiac silhouette   enlargement. No pneumothorax or sizable pleural effusion. Medical Decision Making and ED Course   - I am the first and primary provider for this patient AND AM THE PRIMARY PROVIDER OF RECORD. - I reviewed the vital signs, available nursing notes, past medical history, past surgical history, family history and social history. - Initial assessment performed. The patients presenting problems have been discussed, and the staff are in agreement with the care plan formulated and outlined with them. I have encouraged them to ask questions as they arise throughout their visit. Vital Signs-Reviewed the patient's vital signs. Patient Vitals for the past 12 hrs:   Pulse Resp BP SpO2   03/19/21 1057    (P) 93 %   03/19/21 1050 (!) 112 (!) 40 (!) 212/121 (!) 66 %       EKG interpretation: (Preliminary): Performed at 1053, and read at 1055  Rhythm: sinus tachycardia; and regular . Rate (approx.): 107; Axis: right axis deviation; MD interval: normal; QRS interval: prolonged; ST/T wave: Mild ST depression with T wave inversion in the lateral chest leads I and aVL with a partial left bundle branch pattern no acute injury pattern; Other findings: .      Records Reviewed: Nursing Notes and Old Medical Records    The patient presents with respiratory distress with a differential diagnosis of pulmonary edema; pneumonia; COVID-19; acute MI; vascular catastrophe pneumothorax    ED Course:              Provider Notes (Medical Decision Making):   Arrived in respiratory extremis with severe hypoxia placed on BiPAP with improvement over approximately 5 to 10 minutes oxygen saturation now 93% on 80% O2 BiPAP.   Portable chest x-ray shows bilateral pulmonary infiltrates with dense consolidation in the right upper lobe and right middle lobe more interstitial findings in the left lung field-radiology interpretation pending. Begun treatment for congestive heart failure and pneumonia. Covid swab ordered. 1115: Much improved oxygen saturation now 97% on BiPAP heart rate is decreased to approximately 100 patient more coherent. Will need ICU care have made a call to transfer center. [facility]           Consultations:       Consultations: -  [facility]        Procedures and Critical Care       Performed by: Ghassan Dolan MD  PROCEDURES: BiPAP  Procedures               CRITICAL CARE NOTE :  11:07 AM  Amount of Critical Care Time: 60(minutes)    IMPENDING DETERIORATION -Respiratory, Cardiovascular and Metabolic  ASSOCIATED RISK FACTORS - Hypotension, Hypoxia, Dysrhythmia, Metabolic changes and CNS Decompensation  MANAGEMENT- Bedside Assessment  INTERPRETATION -  Xrays, Blood Gases and ECG  INTERVENTIONS - hemodynamic mngmt, vent mngmt and Metobolic interventions  CASE REVIEW -transfer physician Dr. All Monroe - Self    NOTES   :  I have spent critical care time involved in lab review, consultations with specialist, family decision- making, bedside attention and documentation. This time excludes time spent in any separate billed procedures. During this entire length of time I was immediately available to the patient . Ghassan Dolan MD        Disposition     Disposition: Transferred to [facility] patient verbally agreed to transfer and understand the risks involved as outlined in the EMTALA form. Remove if not discharged  DISCHARGE PLAN:  1. There are no discharge medications for this patient. 2.   Follow-up Information    None       3. Return to ED if worse   4. There are no discharge medications for this patient. Diagnosis     Clinical Impression: Respiratory failure pneumonia congestive heart failure;  Covid rule out  Attestations:    Ghassan Dolan MD    Please note that this dictation was completed with Dragon, the computer voice recognition software. Quite often unanticipated grammatical, syntax, homophones, and other interpretive errors are inadvertently transcribed by the computer software. Please disregard these errors. Please excuse any errors that have escaped final proofreading. Thank you.

## 2021-03-19 NOTE — H&P
History & Physical    Primary Care Provider: Jaden, MD Jayden  Source of Information: Patient/family     History of Presenting Illness:   Patient is a 50-year-old male with a history of hypertension and unspecified heart failure who was transferred from [facility] where he presented with a 2 to 3-day history of worsening shortness of breath and nonproductive cough. He denies fever. He does note increased lower extremity edema. No chest pain. EMS found him to be hypoxic with saturations in the 70s. On a nonrebreather his oxygenation improved. On arrival he was started on BiPAP and given IV furosemide after which she was able to be weaned to high flow oxygen. Chest x-ray showed bilateral infiltrates. White count was 17,000. He was given Zosyn x1 dose and transferred here. Review of Systems:  Pertinent items are noted in the History of Present Illness. Past Medical History:   Diagnosis Date    CKD (chronic kidney disease) stage 3, GFR 30-59 ml/min     Diabetes ([facility]     Heart failure ([facility]     Hypertension         History reviewed. No pertinent surgical history. Prior to Admission medications    Medication Sig Start Date End Date Taking? Authorizing Provider   labetaloL (NORMODYNE) 300 mg tablet Take 600 mg by mouth two (2) times a day. Yes Provider, Historical   cloNIDine HCL (CATAPRES) 0.1 mg tablet Take  by mouth two (2) times a day. Yes Provider, Historical   amLODIPine (NORVASC) 10 mg tablet Take  by mouth daily. Yes Provider, Historical   allopurinoL (ZYLOPRIM) 300 mg tablet Take  by mouth daily. Yes Provider, Historical   glipiZIDE SR (GLUCOTROL XL) 10 mg CR tablet Take 20 mg by mouth daily. Yes Provider, Historical   lisinopriL (PRINIVIL, ZESTRIL) 20 mg tablet Take 20 mg by mouth daily.    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 Head:  Normocephalic, without obvious abnormality, atraumatic. Eyes:  Conjunctivae/corneas clear. PERRL, EOMs intact. Nose: Nares normal. Septum midline. Mucosa normal. No drainage or sinus tenderness. Throat: Lips, mucosa, and tongue normal. Teeth and gums normal.   Neck: Supple, symmetrical, trachea midline, no adenopathy, thyroid: no enlargement/tenderness/nodules, no carotid bruit and no JVD. Back:   Symmetric, no curvature. ROM normal. No CVA tenderness. Lungs:    Coarse crackles and rhonchi bilaterally   Chest wall:  No tenderness or deformity. Heart:  Regular rate and rhythm, S1, S2 normal, no murmur, click, rub or gallop. Abdomen:   Soft, non-tender. Bowel sounds normal. No masses,  No organomegaly. Extremities:  2+ edema   Pulses: 2+ and symmetric all extremities. Skin: Skin color, texture, turgor normal. No rashes or lesions   Neurologic: CNII-XII intact. No motor or sensory deficits.         24 Hour Results:    Recent Results (from the past 24 hour(s))   METABOLIC PANEL, COMPREHENSIVE    Collection Time: 03/19/21 11:00 AM   Result Value Ref Range    Sodium 140 136 - 145 mmol/L    Potassium 4.0 3.5 - 5.1 mmol/L    Chloride 106 97 - 108 mmol/L    CO2 17 (L) 21 - 32 mmol/L Anion gap 17 (H) 5 - 15 mmol/L    Glucose 297 (H) 65 - 100 mg/dL    BUN 32 (H) 6 - 20 mg/dL    Creatinine 2.19 (H) 0.70 - 1.30 mg/dL    BUN/Creatinine ratio 15 12 - 20      GFR est AA 36 (L) >60 ml/min/1.73m2    GFR est non-AA 30 (L) >60 ml/min/1.73m2    Calcium 9.2 8.5 - 10.1 mg/dL    Bilirubin, total 0.5 0.2 - 1.0 mg/dL    AST (SGOT) 18 15 - 37 U/L    ALT (SGPT) 18 12 - 78 U/L    Alk. phosphatase 77 45 - 117 U/L    Protein, total 8.0 6.4 - 8.2 g/dL    Albumin 3.1 (L) 3.5 - 5.0 g/dL    Globulin 4.9 (H) 2.0 - 4.0 g/dL    A-G Ratio 0.6 (L) 1.1 - 2.2     CBC WITH AUTOMATED DIFF    Collection Time: 03/19/21 11:00 AM   Result Value Ref Range    WBC 17.4 (H) 4.4 - 11.3 K/uL    RBC 4.11 (L) 4.50 - 5.90 M/uL    HGB 10.0 (L) 13.5 - 17.5 g/dL    HCT 32.1 (L) 41 - 53 %    MCV 78.2 (L) 80 - 100 FL    MCH 24.2 (L) 31 - 34 PG    MCHC 31.0 31.0 - 36.0 g/dL    RDW 16.9 (H) 11.5 - 14.5 %    PLATELET 629 K/uL    MPV 9.0 6.5 - 11.5 FL    NRBC 0.2  WBC    ABSOLUTE NRBC 0.03 K/uL    NEUTROPHILS 66 42 - 75 %    LYMPHOCYTES 21 20.5 - 51.1 %    MONOCYTES 9 1.7 - 9.3 %    EOSINOPHILS 3 (H) 0.9 - 2.9 %    BASOPHILS 1 0.0 - 2.5 %    ABS. NEUTROPHILS 11.6 (H) 1.8 - 7.7 K/UL    ABS. LYMPHOCYTES 3.6 1.0 - 4.8 K/UL    ABS. MONOCYTES 1.6 0.2 - 2.4 K/UL    ABS. EOSINOPHILS 0.4 0.0 - 0.7 K/UL    ABS.  BASOPHILS 0.1 0.0 - 0.2 K/UL   TROPONIN I    Collection Time: 03/19/21 11:00 AM   Result Value Ref Range    Troponin-I, Qt. <0.05 <0.05 ng/mL   PROTHROMBIN TIME + INR    Collection Time: 03/19/21 11:00 AM   Result Value Ref Range    Prothrombin time 10.7 9.0 - 11.1 sec    INR 1.1 0.9 - 1.1     LACTIC ACID    Collection Time: 03/19/21 11:00 AM   Result Value Ref Range    Lactic acid 4.4 (HH) 0.4 - 2.0 mmol/L   BLOOD GAS, ARTERIAL    Collection Time: 03/19/21 11:14 AM   Result Value Ref Range    pH 7.33 (L) 7.35 - 7.45      PCO2 29 (L) 35 - 45 mmHg    PO2 77 70 - 100 mmHg    BICARBONATE 15 (L) 22 - 26 mmol/L    BASE DEFICIT 9.9 (H) 0 - 2 mmol/L O2 METHOD BiPAP      FIO2 80.0 %    SPONTANEOUS RATE 10      PRESSURE SUPPORT 8      IPAP/PIP 18      EPAP/CPAP/PEEP 10      Sample source Arterial      SITE Left Radial      PAVEL'S TEST PASS      Carboxy-Hgb 0.1 0 - 5 %    Methemoglobin 0.3 0 - 5 %    tHb 10.7 (L) 13.5 - 17.5 g/dL    Oxyhemoglobin 95.2 95 - 100 %   SARS-COV-2    Collection Time: 03/19/21 11:32 AM   Result Value Ref Range    SARS-CoV-2 Nasopharyngeal     COVID-19 RAPID TEST    Collection Time: 03/19/21 11:32 AM   Result Value Ref Range    Specimen source Nasopharyngeal      COVID-19 rapid test Not Detected Not Detected     URINALYSIS W/ RFLX MICROSCOPIC    Collection Time: 03/19/21 11:45 AM   Result Value Ref Range    Color Yellow/Straw      Appearance Clear Clear      Specific gravity 1.025 1.003 - 1.030      pH (UA) 6.0 5.0 - 8.0      Protein 100 (A) Negative mg/dL    Glucose Negative Negative mg/dL    Ketone Negative Negative mg/dL    Bilirubin Negative Negative      Blood Small (A) Negative      Urobilinogen 0.2 0.2 - 1.0 EU/dL    Nitrites Negative Negative      Leukocyte Esterase Negative Negative     URINE MICROSCOPIC    Collection Time: 03/19/21 11:45 AM   Result Value Ref Range    WBC 0-4 0 - 5 /hpf    RBC 5-10 0 - 3 /hpf    Bacteria 1+ (A) Negative /hpf   BNP    Collection Time: 03/19/21  2:30 PM   Result Value Ref Range    NT pro-BNP 10,641 (H) <125 pg/mL   LACTIC ACID    Collection Time: 03/19/21  2:30 PM   Result Value Ref Range    Lactic acid 1.4 0.4 - 2.0 mmol/L         Imaging:   No orders to display           Assessment:   Severe bilateral community-acquired pneumonia    Probable acute on chronic heart failure, unspecified    Acute respiratory failure with hypoxia    Accelerated hypertension    Sepsis with tachycardia, leukocytosis and tachypnea    Lactic acidosis due to hypoxia    Diabetes mellitus type 2    Chronic kidney disease stage III    Anemia of chronic kidney disease    Plan:   Admit to ICU  Continue high flow oxygen  IV Zosyn and levofloxacin  Check BNP and procalcitonin  IV furosemide. Obtain echocardiogram  Pulmonology and cardiology consultations   -Discussed with Dr. Brooke Pierre and with Dr. Sal Salazar directive discussed. Patient unable to make a decision about CODE STATUS.   His wife is his surrogate decision maker      Home medications were reviewed    Signed By: Catherine Wahl MD     March 19, 2021

## 2021-03-19 NOTE — ED TRIAGE NOTES
Pt arrived to ED via POV with CO respiratory distress. Pt unable to speak. O2 sat 66% on room air.  Dr Romero Allison made aware

## 2021-03-19 NOTE — PROGRESS NOTES
Bipap initiated at this time. Initials settings of 18/10 80%. Saturation finally climbed up to 93% . . ABG to follow.

## 2021-03-20 ENCOUNTER — APPOINTMENT (OUTPATIENT)
Dept: GENERAL RADIOLOGY | Age: 71
DRG: 871 | End: 2021-03-20
Attending: INTERNAL MEDICINE
Payer: MEDICARE

## 2021-03-20 LAB
ALBUMIN SERPL-MCNC: 2.2 G/DL (ref 3.5–5)
ANION GAP SERPL CALC-SCNC: 9 MMOL/L (ref 5–15)
BASOPHILS # BLD: 0 K/UL (ref 0–0.1)
BASOPHILS NFR BLD: 0 % (ref 0–1)
BUN SERPL-MCNC: 28 MG/DL (ref 6–20)
BUN/CREAT SERPL: 14 (ref 12–20)
CA-I BLD-MCNC: 7.9 MG/DL (ref 8.5–10.1)
CHLORIDE SERPL-SCNC: 113 MMOL/L (ref 97–108)
CO2 SERPL-SCNC: 20 MMOL/L (ref 21–32)
CREAT SERPL-MCNC: 1.95 MG/DL (ref 0.7–1.3)
DIFFERENTIAL METHOD BLD: ABNORMAL
EOSINOPHIL # BLD: 0.1 K/UL (ref 0–0.4)
EOSINOPHIL NFR BLD: 1 % (ref 0–7)
ERYTHROCYTE [DISTWIDTH] IN BLOOD BY AUTOMATED COUNT: 17 % (ref 11.5–14.5)
GLUCOSE BLD STRIP.AUTO-MCNC: 206 MG/DL (ref 65–100)
GLUCOSE SERPL-MCNC: 133 MG/DL (ref 65–100)
HCT VFR BLD AUTO: 26.6 % (ref 36.6–50.3)
HCT VFR BLD AUTO: 26.7 % (ref 36.6–50.3)
HGB BLD-MCNC: 8.3 G/DL (ref 12.1–17)
HGB BLD-MCNC: 8.4 G/DL (ref 12.1–17)
IMM GRANULOCYTES # BLD AUTO: 0.1 K/UL (ref 0–0.04)
IMM GRANULOCYTES NFR BLD AUTO: 1 % (ref 0–0.5)
LYMPHOCYTES # BLD: 1.3 K/UL (ref 0.8–3.5)
LYMPHOCYTES NFR BLD: 12 % (ref 12–49)
MCH RBC QN AUTO: 23.7 PG (ref 26–34)
MCHC RBC AUTO-ENTMCNC: 31.5 G/DL (ref 30–36.5)
MCV RBC AUTO: 75.4 FL (ref 80–99)
MONOCYTES # BLD: 1.1 K/UL (ref 0–1)
MONOCYTES NFR BLD: 10 % (ref 5–13)
MRSA DNA SPEC QL NAA+PROBE: NOT DETECTED
NEUTS SEG # BLD: 8.3 K/UL (ref 1.8–8)
NEUTS SEG NFR BLD: 76 % (ref 32–75)
PERFORMED BY, TECHID: ABNORMAL
PHOSPHATE SERPL-MCNC: 3.3 MG/DL (ref 2.6–4.7)
PLATELET # BLD AUTO: 272 K/UL (ref 150–400)
POTASSIUM SERPL-SCNC: 3.8 MMOL/L (ref 3.5–5.1)
RBC # BLD AUTO: 3.54 M/UL (ref 4.1–5.7)
SODIUM SERPL-SCNC: 142 MMOL/L (ref 136–145)
WBC # BLD AUTO: 10.9 K/UL (ref 4.1–11.1)

## 2021-03-20 PROCEDURE — 65270000029 HC RM PRIVATE

## 2021-03-20 PROCEDURE — 74011250637 HC RX REV CODE- 250/637: Performed by: INTERNAL MEDICINE

## 2021-03-20 PROCEDURE — 85025 COMPLETE CBC W/AUTO DIFF WBC: CPT

## 2021-03-20 PROCEDURE — 74011250636 HC RX REV CODE- 250/636: Performed by: INTERNAL MEDICINE

## 2021-03-20 PROCEDURE — 87641 MR-STAPH DNA AMP PROBE: CPT

## 2021-03-20 PROCEDURE — 77010033678 HC OXYGEN DAILY

## 2021-03-20 PROCEDURE — 82962 GLUCOSE BLOOD TEST: CPT

## 2021-03-20 PROCEDURE — 80069 RENAL FUNCTION PANEL: CPT

## 2021-03-20 PROCEDURE — 74011000258 HC RX REV CODE- 258: Performed by: INTERNAL MEDICINE

## 2021-03-20 PROCEDURE — 36415 COLL VENOUS BLD VENIPUNCTURE: CPT

## 2021-03-20 PROCEDURE — 71045 X-RAY EXAM CHEST 1 VIEW: CPT

## 2021-03-20 PROCEDURE — 85018 HEMOGLOBIN: CPT

## 2021-03-20 RX ORDER — AMLODIPINE BESYLATE 5 MG/1
10 TABLET ORAL DAILY
Status: CANCELLED | OUTPATIENT
Start: 2021-03-20

## 2021-03-20 RX ORDER — CLONIDINE HYDROCHLORIDE 0.1 MG/1
0.1 TABLET ORAL 2 TIMES DAILY
Status: CANCELLED | OUTPATIENT
Start: 2021-03-20

## 2021-03-20 RX ORDER — CARVEDILOL 3.12 MG/1
6.25 TABLET ORAL 2 TIMES DAILY WITH MEALS
Status: DISCONTINUED | OUTPATIENT
Start: 2021-03-20 | End: 2021-03-23

## 2021-03-20 RX ORDER — LABETALOL 200 MG/1
600 TABLET, FILM COATED ORAL 2 TIMES DAILY
Status: CANCELLED | OUTPATIENT
Start: 2021-03-20

## 2021-03-20 RX ORDER — ALLOPURINOL 100 MG/1
200 TABLET ORAL DAILY
Status: CANCELLED | OUTPATIENT
Start: 2021-03-20

## 2021-03-20 RX ORDER — LISINOPRIL 20 MG/1
20 TABLET ORAL DAILY
Status: CANCELLED | OUTPATIENT
Start: 2021-03-20

## 2021-03-20 RX ORDER — FUROSEMIDE 10 MG/ML
60 INJECTION INTRAMUSCULAR; INTRAVENOUS DAILY
Status: DISCONTINUED | OUTPATIENT
Start: 2021-03-21 | End: 2021-03-22

## 2021-03-20 RX ADMIN — Medication 10 ML: at 14:41

## 2021-03-20 RX ADMIN — CARVEDILOL 6.25 MG: 3.12 TABLET, FILM COATED ORAL at 17:37

## 2021-03-20 RX ADMIN — CARVEDILOL 6.25 MG: 3.12 TABLET, FILM COATED ORAL at 09:48

## 2021-03-20 RX ADMIN — PIPERACILLIN AND TAZOBACTAM 3.38 G: 3; .375 INJECTION, POWDER, FOR SOLUTION INTRAVENOUS at 04:53

## 2021-03-20 RX ADMIN — Medication 10 ML: at 06:17

## 2021-03-20 RX ADMIN — ACETAMINOPHEN 650 MG: 325 TABLET, FILM COATED ORAL at 21:38

## 2021-03-20 RX ADMIN — PIPERACILLIN AND TAZOBACTAM 3.38 G: 3; .375 INJECTION, POWDER, FOR SOLUTION INTRAVENOUS at 13:29

## 2021-03-20 RX ADMIN — Medication 10 ML: at 22:00

## 2021-03-20 RX ADMIN — ACETAMINOPHEN 650 MG: 325 TABLET, FILM COATED ORAL at 10:05

## 2021-03-20 RX ADMIN — ENOXAPARIN SODIUM 40 MG: 100 INJECTION SUBCUTANEOUS at 08:50

## 2021-03-20 RX ADMIN — PIPERACILLIN AND TAZOBACTAM 3.38 G: 3; .375 INJECTION, POWDER, FOR SOLUTION INTRAVENOUS at 21:38

## 2021-03-20 NOTE — PROGRESS NOTES
Problem: Pneumonia: Day 1  Goal: Medications  3/20/2021 0002 by Lily Lai, RN  Outcome: Progressing Towards Goal  3/20/2021 0002 by Lily Lai, RN  Outcome: Progressing Towards Goal     Problem: Pneumonia: Day 1  Goal: Respiratory  3/20/2021 0002 by Lily Lai, RN  Outcome: Progressing Towards Goal  3/20/2021 0002 by Lily Lai, RN  Outcome: Progressing Towards Goal

## 2021-03-20 NOTE — PROGRESS NOTES
Hospitalist Progress Note               Daily Progress Note: 3/20/2021      Subjective: The patient is seen for follow up. He is doing better overall this morning. He was on 70% FiO2 high flow when I walked in the room but was able to turn him down to 50% and saturations remained in the 90s. Fluid balance negative by 1.7 L since admission. His proBNP was greater than 10,000      Problem List:  Problem List as of 3/20/2021 Never Reviewed          Codes Class Noted - Resolved    CAP (community acquired pneumonia) ICD-10-CM: J18.9  ICD-9-CM: 958  3/19/2021 - Present              Medications reviewed  Current Facility-Administered Medications   Medication Dose Route Frequency    carvediloL (COREG) tablet 6.25 mg  6.25 mg Oral BID WITH MEALS    sodium chloride (NS) flush 5-40 mL  5-40 mL IntraVENous Q8H    sodium chloride (NS) flush 5-40 mL  5-40 mL IntraVENous PRN    acetaminophen (TYLENOL) tablet 650 mg  650 mg Oral Q4H PRN    enoxaparin (LOVENOX) injection 40 mg  40 mg SubCUTAneous DAILY    piperacillin-tazobactam (ZOSYN) 3.375 g in 0.9% sodium chloride (MBP/ADV) 100 mL MBP  3.375 g IntraVENous Q8H    ondansetron (ZOFRAN) injection 4 mg  4 mg IntraVENous Q6H PRN    docusate sodium (COLACE) capsule 100 mg  100 mg Oral PRN    levoFLOXacin (LEVAQUIN) 750 mg in D5W IVPB  750 mg IntraVENous Q48H    furosemide (LASIX) injection 80 mg  80 mg IntraVENous DAILY       Review of Systems:   A comprehensive review of systems was negative except for that written in the HPI. Objective:   Physical Exam:     Visit Vitals  BP (!) 160/83   Pulse 86   Temp 99.8 °F (37.7 °C)   Resp 24   Ht 5' 10\" (1.778 m)   Wt 88.4 kg (194 lb 14.2 oz)   SpO2 98%   BMI 27.96 kg/m²    O2 Flow Rate (L/min): 50 l/min O2 Device: Heated, Hi flow nasal cannula    Temp (24hrs), Av.5 °F (36.9 °C), Min:98 °F (36.7 °C), Max:99.8 °F (37.7 °C)    No intake/output data recorded.    1 -  0700  In: 472 [P.O.:222; I.V.:250]  Out: 2200 [Urine:2200]    General:   Awake and alert   Lungs:    Crackles bilateral.   Chest wall:  No tenderness or deformity. Heart:  Regular rate and rhythm, S1, S2 normal, no murmur, click, rub or gallop. Abdomen:   Soft, non-tender. Bowel sounds normal. No masses,  No organomegaly. Extremities: Extremities normal, atraumatic, no cyanosis   1+ edema   Pulses: 2+ and symmetric all extremities. Skin: Skin color, texture, turgor normal. No rashes or lesions   Neurologic: CNII-XII intact. No gross focal deficits         Data Review:       Recent Days:  Recent Labs     03/20/21  0450 03/19/21  1100   WBC 10.9 17.4*   HGB 8.4* 10.0*   HCT 26.7* 32.1*    366     Recent Labs     03/20/21  0450 03/19/21  1100    140   K 3.8 4.0   * 106   CO2 20* 17*   * 297*   BUN 28* 32*   CREA 1.95* 2.19*   CA 7.9* 9.2   PHOS 3.3  --    ALB 2.2* 3.1*   TBILI  --  0.5   ALT  --  18   INR  --  1.1     Recent Labs     03/19/21  1114   PH 7.33*   PCO2 29*   PO2 77   HCO3 15*   FIO2 80.0       24 Hour Results:  Recent Results (from the past 24 hour(s))   METABOLIC PANEL, COMPREHENSIVE    Collection Time: 03/19/21 11:00 AM   Result Value Ref Range    Sodium 140 136 - 145 mmol/L    Potassium 4.0 3.5 - 5.1 mmol/L    Chloride 106 97 - 108 mmol/L    CO2 17 (L) 21 - 32 mmol/L    Anion gap 17 (H) 5 - 15 mmol/L    Glucose 297 (H) 65 - 100 mg/dL    BUN 32 (H) 6 - 20 mg/dL    Creatinine 2.19 (H) 0.70 - 1.30 mg/dL    BUN/Creatinine ratio 15 12 - 20      GFR est AA 36 (L) >60 ml/min/1.73m2    GFR est non-AA 30 (L) >60 ml/min/1.73m2    Calcium 9.2 8.5 - 10.1 mg/dL    Bilirubin, total 0.5 0.2 - 1.0 mg/dL    AST (SGOT) 18 15 - 37 U/L    ALT (SGPT) 18 12 - 78 U/L    Alk.  phosphatase 77 45 - 117 U/L    Protein, total 8.0 6.4 - 8.2 g/dL    Albumin 3.1 (L) 3.5 - 5.0 g/dL    Globulin 4.9 (H) 2.0 - 4.0 g/dL    A-G Ratio 0.6 (L) 1.1 - 2.2     CBC WITH AUTOMATED DIFF    Collection Time: 03/19/21 11:00 AM   Result Value Ref Range    WBC 17.4 (H) 4.4 - 11.3 K/uL    RBC 4.11 (L) 4.50 - 5.90 M/uL    HGB 10.0 (L) 13.5 - 17.5 g/dL    HCT 32.1 (L) 41 - 53 %    MCV 78.2 (L) 80 - 100 FL    MCH 24.2 (L) 31 - 34 PG    MCHC 31.0 31.0 - 36.0 g/dL    RDW 16.9 (H) 11.5 - 14.5 %    PLATELET 732 K/uL    MPV 9.0 6.5 - 11.5 FL    NRBC 0.2  WBC    ABSOLUTE NRBC 0.03 K/uL    NEUTROPHILS 66 42 - 75 %    LYMPHOCYTES 21 20.5 - 51.1 %    MONOCYTES 9 1.7 - 9.3 %    EOSINOPHILS 3 (H) 0.9 - 2.9 %    BASOPHILS 1 0.0 - 2.5 %    ABS. NEUTROPHILS 11.6 (H) 1.8 - 7.7 K/UL    ABS. LYMPHOCYTES 3.6 1.0 - 4.8 K/UL    ABS. MONOCYTES 1.6 0.2 - 2.4 K/UL    ABS. EOSINOPHILS 0.4 0.0 - 0.7 K/UL    ABS.  BASOPHILS 0.1 0.0 - 0.2 K/UL   TROPONIN I    Collection Time: 03/19/21 11:00 AM   Result Value Ref Range    Troponin-I, Qt. <0.05 <0.05 ng/mL   PROTHROMBIN TIME + INR    Collection Time: 03/19/21 11:00 AM   Result Value Ref Range    Prothrombin time 10.7 9.0 - 11.1 sec    INR 1.1 0.9 - 1.1     LACTIC ACID    Collection Time: 03/19/21 11:00 AM   Result Value Ref Range    Lactic acid 4.4 (HH) 0.4 - 2.0 mmol/L   BLOOD GAS, ARTERIAL    Collection Time: 03/19/21 11:14 AM   Result Value Ref Range    pH 7.33 (L) 7.35 - 7.45      PCO2 29 (L) 35 - 45 mmHg    PO2 77 70 - 100 mmHg    BICARBONATE 15 (L) 22 - 26 mmol/L    BASE DEFICIT 9.9 (H) 0 - 2 mmol/L    O2 METHOD BiPAP      FIO2 80.0 %    SPONTANEOUS RATE 10      PRESSURE SUPPORT 8      IPAP/PIP 18      EPAP/CPAP/PEEP 10      Sample source Arterial      SITE Left Radial      PAVEL'S TEST PASS      Carboxy-Hgb 0.1 0 - 5 %    Methemoglobin 0.3 0 - 5 %    tHb 10.7 (L) 13.5 - 17.5 g/dL    Oxyhemoglobin 95.2 95 - 100 %   CULTURE, BLOOD    Collection Time: 03/19/21 11:15 AM    Specimen: Blood   Result Value Ref Range    Special Requests: No Special Requests      Culture result: No growth after 20 hours     CULTURE, BLOOD    Collection Time: 03/19/21 11:19 AM    Specimen: Blood   Result Value Ref Range    Special Requests: No Special Requests      Culture result: No growth after 20 hours     SARS-COV-2    Collection Time: 03/19/21 11:32 AM   Result Value Ref Range    SARS-CoV-2 Nasopharyngeal     COVID-19 RAPID TEST    Collection Time: 03/19/21 11:32 AM   Result Value Ref Range    Specimen source Nasopharyngeal      COVID-19 rapid test Not Detected Not Detected     URINALYSIS W/ RFLX MICROSCOPIC    Collection Time: 03/19/21 11:45 AM   Result Value Ref Range    Color Yellow/Straw      Appearance Clear Clear      Specific gravity 1.025 1.003 - 1.030      pH (UA) 6.0 5.0 - 8.0      Protein 100 (A) Negative mg/dL    Glucose Negative Negative mg/dL    Ketone Negative Negative mg/dL    Bilirubin Negative Negative      Blood Small (A) Negative      Urobilinogen 0.2 0.2 - 1.0 EU/dL    Nitrites Negative Negative      Leukocyte Esterase Negative Negative     URINE MICROSCOPIC    Collection Time: 03/19/21 11:45 AM   Result Value Ref Range    WBC 0-4 0 - 5 /hpf    RBC 5-10 0 - 3 /hpf    Bacteria 1+ (A) Negative /hpf   BNP    Collection Time: 03/19/21  2:30 PM   Result Value Ref Range    NT pro-BNP 10,641 (H) <125 pg/mL   LACTIC ACID    Collection Time: 03/19/21  2:30 PM   Result Value Ref Range    Lactic acid 1.4 0.4 - 2.0 mmol/L   GLUCOSE, POC    Collection Time: 03/19/21  7:03 PM   Result Value Ref Range    Glucose (POC) 178 (H) 65 - 100 mg/dL    Performed by Meir Hickman    RENAL FUNCTION PANEL    Collection Time: 03/20/21  4:50 AM   Result Value Ref Range    Sodium 142 136 - 145 mmol/L    Potassium 3.8 3.5 - 5.1 mmol/L    Chloride 113 (H) 97 - 108 mmol/L    CO2 20 (L) 21 - 32 mmol/L    Anion gap 9 5 - 15 mmol/L    Glucose 133 (H) 65 - 100 mg/dL    BUN 28 (H) 6 - 20 mg/dL    Creatinine 1.95 (H) 0.70 - 1.30 mg/dL    BUN/Creatinine ratio 14 12 - 20      GFR est AA 41 (L) >60 ml/min/1.73m2    GFR est non-AA 34 (L) >60 ml/min/1.73m2    Calcium 7.9 (L) 8.5 - 10.1 mg/dL    Phosphorus 3.3 2.6 - 4.7 mg/dL    Albumin 2.2 (L) 3.5 - 5.0 g/dL   CBC WITH AUTOMATED DIFF    Collection Time: 03/20/21  4:50 AM   Result Value Ref Range    WBC 10.9 4.1 - 11.1 K/uL    RBC 3.54 (L) 4.10 - 5.70 M/uL    HGB 8.4 (L) 12.1 - 17.0 g/dL    HCT 26.7 (L) 36.6 - 50.3 %    MCV 75.4 (L) 80.0 - 99.0 FL    MCH 23.7 (L) 26.0 - 34.0 PG    MCHC 31.5 30.0 - 36.5 g/dL    RDW 17.0 (H) 11.5 - 14.5 %    PLATELET 754 009 - 935 K/uL    NEUTROPHILS 76 (H) 32 - 75 %    LYMPHOCYTES 12 12 - 49 %    MONOCYTES 10 5 - 13 %    EOSINOPHILS 1 0 - 7 %    BASOPHILS 0 0 - 1 %    IMMATURE GRANULOCYTES 1 (H) 0.0 - 0.5 %    ABS. NEUTROPHILS 8.3 (H) 1.8 - 8.0 K/UL    ABS. LYMPHOCYTES 1.3 0.8 - 3.5 K/UL    ABS. MONOCYTES 1.1 (H) 0.0 - 1.0 K/UL    ABS. EOSINOPHILS 0.1 0.0 - 0.4 K/UL    ABS. BASOPHILS 0.0 0.0 - 0.1 K/UL    ABS. IMM. GRANS. 0.1 (H) 0.00 - 0.04 K/UL    DF AUTOMATED     MRSA SCREEN - PCR (NASAL)    Collection Time: 03/20/21  4:50 AM   Result Value Ref Range    MRSA by PCR, Nasal Not Detected Not Detected         XR CHEST PORT    (Results Pending)        Assessment:  Acute on chronic heart failure, appears to be the primary problem at this point. Possible component of underlying pneumonia as well    Acute respiratory failure with hypoxia     Accelerated hypertension     Sepsis with tachycardia, leukocytosis and tachypnea     Lactic acidosis due to hypoxia     Diabetes mellitus type 2     Chronic kidney disease stage III     Anemia of chronic kidney disease         Plan:  Continue IV antibiotics  Continue IV furosemide change to 60 mg twice daily  Await echo  Repeat chest x-ray  Resume home blood pressure medications, change labetalol to carvedilol  Transfer to [id number] [address] discussed with: Patient/Family and pulm    Total time spent with patient: 30 minutes.     Derrick Phoenix MD

## 2021-03-20 NOTE — PROGRESS NOTES
Skin Assessment performed from ICU transfer to med surg. Skin is clean,dry intact. Hair growth is present on chest and abdomen. NTG patch on (R) anterior chest. No skin breakdown or open wounds noted at this time.

## 2021-03-20 NOTE — PROGRESS NOTES
Was paged that patient with elevated BP and HR. On labetalol 600mg BID at home per H+P. Will restart labetalol at 300mg BID.

## 2021-03-20 NOTE — PROGRESS NOTES
Consult  Pulmonary, Critical Care    Name: Alexandria Conteh MRN: [id number]   : 1950 Hospital: [address] [place] [address]   Date: 3/20/2021  Admission date: 3/19/2021 Hospital Day: 2       Subjective/Interval History:   Seen in the emergency room on nasal high flow oxygen at 80% with oxygen saturation 93% mildly labored respirations. He states that over the last 2 weeks he has been having worsening swelling of his ankles and worsening shortness of breath. Denies fever chills sweats has had some cough nonproductive. He went to the emergency room found to have severe diffuse pulmonary infiltrates was given 1 dose of Lasix placed on BiPAP and transferred to our facility. Of interest he states that 10 or 15 years ago he was hospitalized at [facility] with congestive heart failure had cardiac catheterization with no blockages and was told that medication would control his problem  3/20 feels much better respirations nonlabored have been able to decrease nasal high flow to 40%  Hospital Problems  Never Reviewed          Codes Class Noted POA    CAP (community acquired pneumonia) ICD-10-CM: J18.9  ICD-9-CM: 744  3/19/2021 Unknown              IMPRESSION:   1. Acute hypoxic respiratory failure improved have decreased high flow to 40%  2. Pulmonary edema radiographically improved  3. Diffuse pulmonary infiltrates probable all CHF but cannot rule out underlying infection WBC count normal today  4. Anemia worsening hemoglobin despite diuresis  5. Hypertension better control  6. Acute kidney injury creatinine improved with diuresis  7. Edema  8. Cardiomyopathy  Body mass index is 27.96 kg/m². 9.       RECOMMENDATIONS/PLAN:   1. Continue Lasix  2. Await  echocardiogram  3. Follow renal function  4. Supplemental O2 to keep sats > 93%  5.            [x] High complexity decision making was performed  [x] See my orders for details      Subjective/Initial History:     I was asked by Analilia Escobar MD to see Hugh Calvillo Mickey Salazar  a 80 y.o.  male in consultation for a chief complaint of acute hypoxic respiratory failure pulmonary edema    E  No Known Allergies     MAR reviewed and pertinent medications noted or modified as needed     Current Facility-Administered Medications   Medication    carvediloL (COREG) tablet 6.25 mg    [START ON 3/21/2021] furosemide (LASIX) injection 60 mg    sodium chloride (NS) flush 5-40 mL    sodium chloride (NS) flush 5-40 mL    acetaminophen (TYLENOL) tablet 650 mg    enoxaparin (LOVENOX) injection 40 mg    piperacillin-tazobactam (ZOSYN) 3.375 g in 0.9% sodium chloride (MBP/ADV) 100 mL MBP    ondansetron (ZOFRAN) injection 4 mg    docusate sodium (COLACE) capsule 100 mg    levoFLOXacin (LEVAQUIN) 750 mg in D5W IVPB      Patient PCP: Jaden, MD Jayden  PMH:  has a past medical history of CKD (chronic kidney disease) stage 3, GFR 30-59 ml/min, Diabetes ([facility] [address] Heart failure ([facility] [address] and Hypertension. PSH:   has no past surgical history on file. FHX: family history is not on file. SHX:  reports that he has never smoked. He has never used smokeless tobacco. He reports previous alcohol use. Systemic review  General states his weight stable has not noted chronic fever chills or sweats at home  Eyes no double vision or momentary blindness  ENT no sinus congestion drainage or facial pain  Endocrinologic no polyuria polydipsia  Musculoskeletal no swollen tender joints  Neurologic no seizures or syncope  Gastrointestinal no nausea vomiting acid indigestion sour taste  Genitourinary no pain or discomfort on urination no bleeding  Cardiovascular has worsening edema over the last 2 weeks with shortness of breath and nonproductive cough.   Gives a history of having been hospitalized at [facility] 10 or 15 years ago with heart congestion and was started on medications  Respiratory increased shortness of breath nonproductive cough no fever chills or sweats    Objective:     Vital Signs: Telemetry:    normal sinus rhythm Intake/Output:   Visit Vitals  BP (!) 160/83   Pulse 86   Temp 99.8 °F (37.7 °C)   Resp 24   Ht 5' 10\" (1.778 m)   Wt 88.4 kg (194 lb 14.2 oz)   SpO2 98%   BMI 27.96 kg/m²       Temp (24hrs), Av.5 °F (36.9 °C), Min:98 °F (36.7 °C), Max:99.8 °F (37.7 °C)        O2 Device: Heated, Hi flow nasal cannula O2 Flow Rate (L/min): 50 l/min       Wt Readings from Last 4 Encounters:   21 88.4 kg (194 lb 14.2 oz)          Intake/Output Summary (Last 24 hours) at 3/20/2021 0846  Last data filed at 3/20/2021 0600  Gross per 24 hour   Intake 472 ml   Output 2200 ml   Net -1728 ml       Last shift:      No intake/output data recorded. Last 3 shifts:  1901 -  0700  In: 472 [P.O.:222; I.V.:250]  Out: 2200 [Urine:2200]       Physical Exam:   General:  male; no distress laying in bed today  HEENT: NCAT, oral mucosa clear  Eyes: anicteric; conjunctiva clear extraocular movements intact  Neck: no nodes, JVD present no accessory muscle usage  Chest: no deformity,   Cardiac: Distant heart sounds seem regular no definite murmur no ankle edema today still has sacral edema  Lungs: Clear anteriorly with posterior rales extending 1/2 way up  Abd: Soft positive bowel sounds no tenderness  Ext: No ankle edema still has sacral edema; no joint swelling;  No clubbing  : clear urine  Neuro: Awake alert oriented moves all 4 extremities  Psych- no agitation, oriented to person;   Skin: warm, dry, no cyanosis;  Pulses: Radial radial femoral pulses intact  Capillary: Normal capillary refill    Labs:    Recent Labs     21  0450 21  1100   WBC 10.9 17.4*   HGB 8.4* 10.0*    366   INR  --  1.1     Recent Labs     21  0450 21  1430 21  1100     --  140   K 3.8  --  4.0   *  --  106   CO2 20*  --  17*   *  --  297*   BUN 28*  --  32*   CREA 1.95*  --  2.19*   CA 7.9*  --  9.2   PHOS 3.3  --   --    LAC  --  1.4 4.4*   ALB 2.2*  -- 3.1*   ALT  --   --  18     Recent Labs     03/19/21  1114   PH 7.33*   PCO2 29*   PO2 77   HCO3 15*   FIO2 80.0   3/2050% nasal high flow with oxygen saturation 98%  3/2040% nasal high flow with oxygen saturation 93%  Recent Labs     03/19/21  1100   TROIQ <0.05     BNP 10,641  COVID-19 antigen negative  Imaging:    CXR Results  (Last 48 hours)  3/20 chest x-ray diffuse bilateral infiltrates persist but are markedly improved               03/19/21 1059  XR CHEST PORT Final result    Narrative:  Chest single view. Comparison single view chest May 1, 2020. Dense airspace opacity throughout the mid and lower lungs, new finding compared   to prior imaging. Suspect severe bilateral pneumonia. Cardiac silhouette   enlargement. No pneumothorax or sizable pleural effusion. To me it looks like severe pulmonary edema           Results from Hospital Encounter encounter on 03/19/21   XR CHEST PORT    Narrative XR CHEST PORT    Comparison: Chest radiograph dated March 19, 2021      Impression FINDINGS/IMPRESSION:  Lungs similarly inflated. Cardiac silhouette stable in size. No radiographically  apparent pneumothorax. Persistent but improvement of diffuse airspace opacity. This may represent  multilobar pneumonia or edema, or perhaps a combination of each. Results from [place] encounter on 03/19/21   XR CHEST PORT    Narrative Chest single view. Comparison single view chest May 1, 2020. Dense airspace opacity throughout the mid and lower lungs, new finding compared  to prior imaging. Suspect severe bilateral pneumonia. Cardiac silhouette  enlargement. No pneumothorax or sizable pleural effusion. He gives a history of heart disease in the past with what sounds like congestive heart failure and now has diffuse severe pulmonary infiltrates and hypoxia with no fever chills or sweats sounds more like pulmonary edema I will give extra dose Lasix.   He does however have leukocytosis of a significant amount so agree with your empiric antibiotic for the time being. Echocardiogram has been ordered  3/20 looks more comfortable today nonlabored sitting at rest.  Have decreased nasal oxygen to 40%. Chest x-ray is markedly improved. Creatinine is slightly improved even with diuresis.   Hemoglobin has dropped we will repeat later today  Agree transfer to medical floor  Michelle Sanchez MD

## 2021-03-20 NOTE — PROGRESS NOTES
Pt transferred to 569. RN at bedside for SBAR and transfer. Pt remains stable. Transferred preformed without incident. Belongings and chart left with receiving RN. Telebox transferred to pt room. Nothing follows.        Dwayne ZAVALA

## 2021-03-20 NOTE — PROGRESS NOTES
Progress Note      3/20/2021 3:18 PM  NAME: Romario Lopez   MRN:  [id number]   Admit Diagnosis: CAP (community acquired pneumonia) [J18.9]      Problem List:   Acute respiratory failure with bilateral pulmonary infiltrates  Sinus tachycardia likely related to above  Acute CHF decompensation, suspected diastolic dysfunction  Type 2 diabetes  Chronic kidney disease  Essential hypertension  Chronic anemia     Assessment/Plan:   Echocardiogram to assess EF and filling pressures (pending)  Serial cardiac enzymes and EKG  Records from [facility] reviewed and this patient has normal EF with diastolic dysfunction; no documented CAD         []       High complexity decision making was performed in this patient at high risk for decompensation with multiple organ involvement. Subjective:     Romario Lopez denies chest pain, dyspnea is better today. Discussed with Dr. José Luis Kitchen. Review of Systems:   Negative except for as noted above. Objective:      Physical Exam:    Last 24hrs VS reviewed since prior progress note. Most recent are:    Visit Vitals  BP (!) 152/89   Pulse 95   Temp 99.8 °F (37.7 °C)   Resp 24   Ht 5' 10\" (1.778 m)   Wt 88.4 kg (194 lb 14.2 oz)   SpO2 97%   BMI 27.96 kg/m²       Intake/Output Summary (Last 24 hours) at 3/20/2021 1518  Last data filed at 3/20/2021 0600  Gross per 24 hour   Intake 472 ml   Output 2200 ml   Net -1728 ml        General Appearance: Alert; no acute distress. Ears/Nose/Mouth/Throat: Hearing grossly normal; moist mucous membranes  Neck: Supple. Chest: Bilateral coarse rhonchi. Cardiovascular: Regular rate and rhythm, S1S2 normal, soft systolic murmur  Abdomen: Soft, non-tender, bowel sounds are active. Extremities: No edema bilaterally. Skin: Warm and dry. []         Post-cath site without hematoma, bruit, tenderness, or thrill. Distal pulses intact.     PMH/SH reviewed - no change compared to H&P        Telemetry: Sinus rhythm      []  No new EKG for review    Lab Data Personally Reviewed:    Recent Labs     03/20/21  0450 03/19/21  1100   WBC 10.9 17.4*   HGB 8.4* 10.0*   HCT 26.7* 32.1*    366     Recent Labs     03/19/21  1100   INR 1.1   PTP 10.7      Recent Labs     03/20/21  0450 03/19/21  1100    140   K 3.8 4.0   * 106   CO2 20* 17*   BUN 28* 32*   CREA 1.95* 2.19*   * 297*   CA 7.9* 9.2     Recent Labs     03/19/21  1100   TROIQ <0.05     No results found for: CHOL, CHOLX, CHLST, CHOLV, HDL, HDLP, LDL, LDLC, DLDLP, TGLX, [place], CHHD, CHHDX    Recent Labs     03/20/21  0450 03/19/21  1100   AP  --  77   TP  --  8.0   ALB 2.2* 3.1*   GLOB  --  4.9*     Recent Labs     03/19/21  1114   PH 7.33*   PCO2 29*   PO2 77       Medications Personally Reviewed:    Current Facility-Administered Medications   Medication Dose Route Frequency    carvediloL (COREG) tablet 6.25 mg  6.25 mg Oral BID WITH MEALS    [START ON 3/21/2021] furosemide (LASIX) injection 60 mg  60 mg IntraVENous DAILY    sodium chloride (NS) flush 5-40 mL  5-40 mL IntraVENous Q8H    sodium chloride (NS) flush 5-40 mL  5-40 mL IntraVENous PRN    acetaminophen (TYLENOL) tablet 650 mg  650 mg Oral Q4H PRN    enoxaparin (LOVENOX) injection 40 mg  40 mg SubCUTAneous DAILY    piperacillin-tazobactam (ZOSYN) 3.375 g in 0.9% sodium chloride (MBP/ADV) 100 mL MBP  3.375 g IntraVENous Q8H    ondansetron (ZOFRAN) injection 4 mg  4 mg IntraVENous Q6H PRN    docusate sodium (COLACE) capsule 100 mg  100 mg Oral PRN    levoFLOXacin (LEVAQUIN) 750 mg in D5W IVPB  750 mg IntraVENous Q48H         Kiesha Catalan MD

## 2021-03-21 ENCOUNTER — APPOINTMENT (OUTPATIENT)
Dept: NON INVASIVE DIAGNOSTICS | Age: 71
DRG: 871 | End: 2021-03-21
Attending: INTERNAL MEDICINE
Payer: MEDICARE

## 2021-03-21 ENCOUNTER — APPOINTMENT (OUTPATIENT)
Dept: GENERAL RADIOLOGY | Age: 71
DRG: 871 | End: 2021-03-21
Attending: INTERNAL MEDICINE
Payer: MEDICARE

## 2021-03-21 LAB
ALBUMIN SERPL-MCNC: 2.3 G/DL (ref 3.5–5)
ANION GAP SERPL CALC-SCNC: 8 MMOL/L (ref 5–15)
BASOPHILS # BLD: 0 K/UL (ref 0–0.1)
BASOPHILS NFR BLD: 0 % (ref 0–1)
BUN SERPL-MCNC: 38 MG/DL (ref 6–20)
BUN/CREAT SERPL: 15 (ref 12–20)
CA-I BLD-MCNC: 9.2 MG/DL (ref 8.5–10.1)
CHLORIDE SERPL-SCNC: 107 MMOL/L (ref 97–108)
CO2 SERPL-SCNC: 25 MMOL/L (ref 21–32)
CREAT SERPL-MCNC: 2.49 MG/DL (ref 0.7–1.3)
DIFFERENTIAL METHOD BLD: ABNORMAL
ECHO AV PEAK GRADIENT: 23 MMHG
ECHO LV E' SEPTAL VELOCITY: 4.97 CM/S
ECHO LV EDV A2C: 101 CM3
ECHO LV EJECTION FRACTION A2C: 7 %
ECHO LV EJECTION FRACTION BIPLANE: 19.6 % (ref 55–100)
ECHO LV ESV A2C: 81.2 CM3
ECHO LV INTERNAL DIMENSION DIASTOLIC: 4.65 CM (ref 4.2–5.9)
ECHO LV INTERNAL DIMENSION SYSTOLIC: 4.33 CM
ECHO LV IVSD: 1.69 CM (ref 0.6–1)
ECHO LV MASS 2D: 317.9 G (ref 88–224)
ECHO LV MASS INDEX 2D: 155.1 G/M2 (ref 49–115)
ECHO LV POSTERIOR WALL DIASTOLIC: 1.5 CM (ref 0.6–1)
ECHO LVOT PEAK GRADIENT: 3 MMHG
ECHO MV A VELOCITY: 113 CM/S
ECHO MV E DECELERATION TIME (DT): 143 MS
ECHO MV E VELOCITY: 74.5 CM/S
ECHO MV E/A RATIO: 0.66
ECHO MV E/E' SEPTAL: 14.99
ECHO PV PEAK INSTANTANEOUS GRADIENT SYSTOLIC: 6 MMHG
ECHO PV REGURGITANT MAX VELOCITY: 122 CM/S
ECHO PV REGURGITANT MAX VELOCITY: 238 CM/S
ECHO PV REGURGITANT MAX VELOCITY: 87 CM/S
ECHO PVEIN A DURATION: 127 MS
ECHO PVEIN A VELOCITY: 38.9 CM/S
ECHO RA AREA 4C: 19.44 CM2
ECHO RV INTERNAL DIMENSION: 3.26 CM
EOSINOPHIL # BLD: 0.4 K/UL (ref 0–0.4)
EOSINOPHIL NFR BLD: 4 % (ref 0–7)
ERYTHROCYTE [DISTWIDTH] IN BLOOD BY AUTOMATED COUNT: 17.1 % (ref 11.5–14.5)
GLUCOSE BLD STRIP.AUTO-MCNC: 186 MG/DL (ref 65–100)
GLUCOSE BLD STRIP.AUTO-MCNC: 188 MG/DL (ref 65–100)
GLUCOSE BLD STRIP.AUTO-MCNC: 222 MG/DL (ref 65–100)
GLUCOSE BLD STRIP.AUTO-MCNC: 238 MG/DL (ref 65–100)
GLUCOSE SERPL-MCNC: 206 MG/DL (ref 65–100)
HCT VFR BLD AUTO: 27.3 % (ref 36.6–50.3)
HGB BLD-MCNC: 8.4 G/DL (ref 12.1–17)
IMM GRANULOCYTES # BLD AUTO: 0.1 K/UL (ref 0–0.04)
IMM GRANULOCYTES NFR BLD AUTO: 1 % (ref 0–0.5)
LYMPHOCYTES # BLD: 1.1 K/UL (ref 0.8–3.5)
LYMPHOCYTES NFR BLD: 12 % (ref 12–49)
MCH RBC QN AUTO: 23.3 PG (ref 26–34)
MCHC RBC AUTO-ENTMCNC: 30.8 G/DL (ref 30–36.5)
MCV RBC AUTO: 75.8 FL (ref 80–99)
MONOCYTES # BLD: 1 K/UL (ref 0–1)
MONOCYTES NFR BLD: 11 % (ref 5–13)
NEUTS SEG # BLD: 6.9 K/UL (ref 1.8–8)
NEUTS SEG NFR BLD: 72 % (ref 32–75)
PERFORMED BY, TECHID: ABNORMAL
PHOSPHATE SERPL-MCNC: 3.1 MG/DL (ref 2.6–4.7)
PLATELET # BLD AUTO: 359 K/UL (ref 150–400)
POTASSIUM SERPL-SCNC: 4.1 MMOL/L (ref 3.5–5.1)
RBC # BLD AUTO: 3.6 M/UL (ref 4.1–5.7)
SODIUM SERPL-SCNC: 140 MMOL/L (ref 136–145)
WBC # BLD AUTO: 9.4 K/UL (ref 4.1–11.1)

## 2021-03-21 PROCEDURE — 74011250637 HC RX REV CODE- 250/637: Performed by: INTERNAL MEDICINE

## 2021-03-21 PROCEDURE — 93306 TTE W/DOPPLER COMPLETE: CPT

## 2021-03-21 PROCEDURE — 65270000029 HC RM PRIVATE

## 2021-03-21 PROCEDURE — 74011000258 HC RX REV CODE- 258: Performed by: INTERNAL MEDICINE

## 2021-03-21 PROCEDURE — 36415 COLL VENOUS BLD VENIPUNCTURE: CPT

## 2021-03-21 PROCEDURE — 82962 GLUCOSE BLOOD TEST: CPT

## 2021-03-21 PROCEDURE — 74011250636 HC RX REV CODE- 250/636: Performed by: INTERNAL MEDICINE

## 2021-03-21 PROCEDURE — 77010033678 HC OXYGEN DAILY

## 2021-03-21 PROCEDURE — 85025 COMPLETE CBC W/AUTO DIFF WBC: CPT

## 2021-03-21 PROCEDURE — 80069 RENAL FUNCTION PANEL: CPT

## 2021-03-21 PROCEDURE — 71046 X-RAY EXAM CHEST 2 VIEWS: CPT

## 2021-03-21 PROCEDURE — 94760 N-INVAS EAR/PLS OXIMETRY 1: CPT

## 2021-03-21 RX ORDER — HYDRALAZINE HYDROCHLORIDE 25 MG/1
25 TABLET, FILM COATED ORAL 3 TIMES DAILY
Status: DISCONTINUED | OUTPATIENT
Start: 2021-03-21 | End: 2021-03-24

## 2021-03-21 RX ORDER — ISOSORBIDE DINITRATE 10 MG/1
10 TABLET ORAL 3 TIMES DAILY
Status: DISCONTINUED | OUTPATIENT
Start: 2021-03-21 | End: 2021-03-25 | Stop reason: HOSPADM

## 2021-03-21 RX ADMIN — FUROSEMIDE 60 MG: 10 INJECTION, SOLUTION INTRAMUSCULAR; INTRAVENOUS at 06:02

## 2021-03-21 RX ADMIN — Medication 10 ML: at 16:29

## 2021-03-21 RX ADMIN — HYDRALAZINE HYDROCHLORIDE 25 MG: 25 TABLET, FILM COATED ORAL at 16:19

## 2021-03-21 RX ADMIN — PIPERACILLIN AND TAZOBACTAM 3.38 G: 3; .375 INJECTION, POWDER, FOR SOLUTION INTRAVENOUS at 04:32

## 2021-03-21 RX ADMIN — Medication 10 ML: at 20:44

## 2021-03-21 RX ADMIN — CARVEDILOL 6.25 MG: 3.12 TABLET, FILM COATED ORAL at 09:08

## 2021-03-21 RX ADMIN — Medication 10 ML: at 06:04

## 2021-03-21 RX ADMIN — HYDRALAZINE HYDROCHLORIDE 25 MG: 25 TABLET, FILM COATED ORAL at 20:43

## 2021-03-21 RX ADMIN — PIPERACILLIN AND TAZOBACTAM 3.38 G: 3; .375 INJECTION, POWDER, FOR SOLUTION INTRAVENOUS at 20:35

## 2021-03-21 RX ADMIN — CARVEDILOL 6.25 MG: 3.12 TABLET, FILM COATED ORAL at 16:19

## 2021-03-21 RX ADMIN — ISOSORBIDE DINITRATE 10 MG: 10 TABLET ORAL at 16:19

## 2021-03-21 RX ADMIN — PIPERACILLIN AND TAZOBACTAM 3.38 G: 3; .375 INJECTION, POWDER, FOR SOLUTION INTRAVENOUS at 12:03

## 2021-03-21 RX ADMIN — LEVOFLOXACIN 750 MG: 5 INJECTION, SOLUTION INTRAVENOUS at 16:19

## 2021-03-21 RX ADMIN — ENOXAPARIN SODIUM 40 MG: 100 INJECTION SUBCUTANEOUS at 09:08

## 2021-03-21 RX ADMIN — ISOSORBIDE DINITRATE 10 MG: 10 TABLET ORAL at 20:43

## 2021-03-21 NOTE — PROGRESS NOTES
Consult  Pulmonary, Critical Care    Name: Romario Lopez MRN: [id number]   : 1950 Hospital: [address] [place] [address]   Date: 3/21/2021  Admission date: 3/19/2021 Hospital Day: 3       Subjective/Interval History:   Seen in the emergency room on nasal high flow oxygen at 80% with oxygen saturation 93% mildly labored respirations. He states that over the last 2 weeks he has been having worsening swelling of his ankles and worsening shortness of breath. Denies fever chills sweats has had some cough nonproductive. He went to the emergency room found to have severe diffuse pulmonary infiltrates was given 1 dose of Lasix placed on BiPAP and transferred to our facility. Of interest he states that 10 or 15 years ago he was hospitalized at [facility] with congestive heart failure had cardiac catheterization with no blockages and was told that medication would control his problem  3/20 feels much better respirations nonlabored have been able to decrease nasal high flow to 40%  3/21 respirations nonlabored on nasal oxygen 6 liters  Hospital Problems  Never Reviewed          Codes Class Noted POA    CAP (community acquired pneumonia) ICD-10-CM: J18.9  ICD-9-CM: 568  3/19/2021 Unknown              IMPRESSION:   1. Acute hypoxic respiratory failure improved now on nasal oxygen 6 L  2. Pulmonary edema radiographically improved 3/20 unchanged on today's x-ray  3. Diffuse pulmonary infiltrates probable all CHF but cannot rule out underlying infection WBC count normal today  4. Anemia  stable  5. Hypertension better control  6. Acute kidney injury creatinine initially improved with diuresis but has gone back up today  7. Edema  8. Cardiomyopathy  Body mass index is 27.49 kg/m². 9.       RECOMMENDATIONS/PLAN:   1. Continue Lasix and continue to follow creatinine  2. Await  echocardiogram  3. Supplemental O2 to keep sats > 93%  4.            [x] High complexity decision making was performed  [x] See my orders for details      Subjective/Initial History:     I was asked by Romelia Rosenbaum MD to see Ml Judge  a 80 y.o.  male in consultation for a chief complaint of acute hypoxic respiratory failure pulmonary edema    E  No Known Allergies     MAR reviewed and pertinent medications noted or modified as needed     Current Facility-Administered Medications   Medication    carvediloL (COREG) tablet 6.25 mg    furosemide (LASIX) injection 60 mg    sodium chloride (NS) flush 5-40 mL    sodium chloride (NS) flush 5-40 mL    acetaminophen (TYLENOL) tablet 650 mg    enoxaparin (LOVENOX) injection 40 mg    piperacillin-tazobactam (ZOSYN) 3.375 g in 0.9% sodium chloride (MBP/ADV) 100 mL MBP    ondansetron (ZOFRAN) injection 4 mg    docusate sodium (COLACE) capsule 100 mg    levoFLOXacin (LEVAQUIN) 750 mg in D5W IVPB      Patient PCP: Jaden, MD Jayden  PMH:  has a past medical history of CKD (chronic kidney disease) stage 3, GFR 30-59 ml/min, Diabetes ([facility] [address] Heart failure ([facility] [address] and Hypertension. PSH:   has no past surgical history on file. FHX: family history is not on file. SHX:  reports that he has never smoked. He has never used smokeless tobacco. He reports previous alcohol use. Systemic review  General states his weight stable has not noted chronic fever chills or sweats at home  Eyes no double vision or momentary blindness  ENT no sinus congestion drainage or facial pain  Endocrinologic no polyuria polydipsia  Musculoskeletal no swollen tender joints  Neurologic no seizures or syncope  Gastrointestinal no nausea vomiting acid indigestion sour taste  Genitourinary no pain or discomfort on urination no bleeding  Cardiovascular has worsening edema over the last 2 weeks with shortness of breath and nonproductive cough.   Gives a history of having been hospitalized at [facility] 10 or 15 years ago with heart congestion and was started on medications  Respiratory increased shortness of breath nonproductive cough no fever chills or sweats    Objective:     Vital Signs: Telemetry:    normal sinus rhythm Intake/Output:   Visit Vitals  BP (!) 151/78   Pulse 81   Temp 99.3 °F (37.4 °C)   Resp 20   Ht 5' 10\" (1.778 m)   Wt 86.9 kg (191 lb 9.3 oz)   SpO2 95%   BMI 27.49 kg/m²       Temp (24hrs), Av.3 °F (37.9 °C), Min:99.3 °F (37.4 °C), Max:101.9 °F (38.8 °C)        O2 Device: Nasal cannula O2 Flow Rate (L/min): 6 l/min       Wt Readings from Last 4 Encounters:   21 86.9 kg (191 lb 9.3 oz)        No intake or output data in the 24 hours ending 21 1449    Last shift:      No intake/output data recorded. Last 3 shifts:  1901 -  0700  In: 472 [P.O.:222; I.V.:250]  Out: 2200 [Urine:2200]       Physical Exam:   General:  male; no distress laying in bed today  HEENT: NCAT, oral mucosa clear  Eyes: anicteric; conjunctiva clear extraocular movements intact  Neck: no nodes, JVD present no accessory muscle usage  Chest: no deformity,   Cardiac: Distant heart sounds seem regular no definite murmur no ankle edema today still has sacral edema  Lungs: Clear anteriorly with posterior rales extending 1/2 way up  Abd: Soft positive bowel sounds no tenderness  Ext: No ankle edema still has sacral edema; no joint swelling;  No clubbing  : clear urine  Neuro: Awake alert oriented moves all 4 extremities  Psych- no agitation, oriented to person;   Skin: warm, dry, no cyanosis;  Pulses: Radial radial femoral pulses intact  Capillary: Normal capillary refill    Labs:    Recent Labs     21  1155 21  1257 21  0450 21  1100   WBC 9.4  --  10.9 17.4*   HGB 8.4* 8.3* 8.4* 10.0*     --  272 366   INR  --   --   --  1.1     Recent Labs     21  1155 21  0450 21  1430 21  1100    142  --  140   K 4.1 3.8  --  4.0    113*  --  106   CO2 25 20*  --  17*   * 133*  --  297*   BUN 38* 28*  --  32*   CREA 2.49* 1.95*  --  2.19* CA 9.2 7.9*  --  9.2   PHOS 3.1 3.3  --   --    LAC  --   --  1.4 4.4*   ALB 2.3* 2.2*  --  3.1*   ALT  --   --   --  18     Recent Labs     03/19/21  1114   PH 7.33*   PCO2 29*   PO2 77   HCO3 15*   FIO2 80.0   3/21 6 L nasal oxygen with oxygen saturation 95%  Recent Labs     03/19/21  1100   TROIQ <0.05     BNP 10,641  COVID-19 antigen negative  Imaging:    CXR Results  (Last 48 hours)  3/20 chest x-ray diffuse bilateral infiltrates persist but are markedly improved               03/19/21 1059  XR CHEST PORT Final result    Narrative:  Chest single view. Comparison single view chest May 1, 2020. Dense airspace opacity throughout the mid and lower lungs, new finding compared   to prior imaging. Suspect severe bilateral pneumonia. Cardiac silhouette   enlargement. No pneumothorax or sizable pleural effusion. To me it looks like severe pulmonary edema           Results from Hospital Encounter encounter on 03/19/21   XR CHEST PA LAT    Narrative XR CHEST PA LAT    Comparison: Chest radiograph dated March 20, 2021 at 0821 hours      Impression FINDINGS/IMPRESSION:  Lungs similarly inflated when compared with the prior. Cardiac silhouette stable in size. Negative for pneumothorax. Persistent patchy bilateral airspace disease. This may represent multilobar  pneumonia or edema, or perhaps a combination of each. Senescent changes are present in the thoracic spine. XR CHEST PORT    Narrative XR CHEST PORT    Comparison: Chest radiograph dated March 19, 2021      Impression FINDINGS/IMPRESSION:  Lungs similarly inflated. Cardiac silhouette stable in size. No radiographically  apparent pneumothorax. Persistent but improvement of diffuse airspace opacity. This may represent  multilobar pneumonia or edema, or perhaps a combination of each. Results from [place] encounter on 03/19/21   XR CHEST PORT    Narrative Chest single view.     Comparison single view chest May 1, 2020.    Dense airspace opacity throughout the mid and lower lungs, new finding compared  to prior imaging. Suspect severe bilateral pneumonia. Cardiac silhouette  enlargement. No pneumothorax or sizable pleural effusion. He gives a history of heart disease in the past with what sounds like congestive heart failure and now has diffuse severe pulmonary infiltrates and hypoxia with no fever chills or sweats sounds more like pulmonary edema I will give extra dose Lasix. He does however have leukocytosis of a significant amount so agree with your empiric antibiotic for the time being. Echocardiogram has been ordered  3/20 looks more comfortable today nonlabored sitting at rest.  Have decreased nasal oxygen to 40%. Chest x-ray is markedly improved. Creatinine is slightly improved even with diuresis. 3/21 denies shortness of breath today states his appetite is poor but sense of smell and taste are normal.  Creatinine has risen today.   Repeat hemoglobin yesterday and this morning are stable    Charmaine Mueller MD

## 2021-03-21 NOTE — MED STUDENT NOTES
Hospitalist Progress Note Daily Progress Note: 3/21/2021 Subjective: The patient is seen for follow up. Mr. Ivania Leong is awake, alert, and comfortable. He states that while he is able to smell his food, he does not have an appetite. He reports a minimal cough. He denies fever, headache, and shortness of breath. He states that he has not had a bowel movement since admission. He also reports increased urinary frequency that is normal in composition with no presence of blood. Patient also states there is mild swelling of his right ankle as compared to his left ankle. He is happy as he talked to his family via [employer] yesterday evening. ProBNP yesterday, 3/20, was greater than 10,000. Blood glucose this morning, 3/21, is 222. Chest xray report from 3/20 reveals persistent but improvement of diffuse airspace opacity. Possibly represents multilobar pneumonia or edema, or perhaps a combination of each. Problem List: 
Problem List as of 3/21/2021 Never Reviewed Codes Class Noted - Resolved CAP (community acquired pneumonia) ICD-10-CM: J18.9 ICD-9-CM: 000  3/19/2021 - Present Medications reviewed Current Facility-Administered Medications Medication Dose Route Frequency  carvediloL (COREG) tablet 6.25 mg  6.25 mg Oral BID WITH MEALS  furosemide (LASIX) injection 60 mg  60 mg IntraVENous DAILY  sodium chloride (NS) flush 5-40 mL  5-40 mL IntraVENous Q8H  
 sodium chloride (NS) flush 5-40 mL  5-40 mL IntraVENous PRN  
 acetaminophen (TYLENOL) tablet 650 mg  650 mg Oral Q4H PRN  
 enoxaparin (LOVENOX) injection 40 mg  40 mg SubCUTAneous DAILY  piperacillin-tazobactam (ZOSYN) 3.375 g in 0.9% sodium chloride (MBP/ADV) 100 mL MBP  3.375 g IntraVENous Q8H  
 ondansetron (ZOFRAN) injection 4 mg  4 mg IntraVENous Q6H PRN  
 docusate sodium (COLACE) capsule 100 mg  100 mg Oral PRN  
 levoFLOXacin (LEVAQUIN) 750 mg in D5W IVPB  750 mg IntraVENous Q48H  
 
 
Review of Systems: A comprehensive review of systems was negative except for that written in the HPI. Objective:  
Physical Exam:  
 
Visit Vitals BP (!) 151/78 Pulse 85 Temp 99.3 °F (37.4 °C) Resp 20 Ht 5' 10\" (1.778 m) Wt 86.9 kg (191 lb 9.3 oz) SpO2 96% BMI 27.49 kg/m² O2 Flow Rate (L/min): 6 l/min O2 Device: Nasal cannula Temp (24hrs), Av.3 °F (37.9 °C), Min:99.3 °F (37.4 °C), Max:101.9 °F (38.8 °C) No intake/output data recorded.  1901 -  0700 In: 472 [P.O.:222; I.V.:250] Out: 2200 [Urine:2200] General:   Awake and alert Lungs:    Crackles bilateral.  
Chest wall:  No tenderness or deformity. Heart:  Regular rate and rhythm, S1, S2 normal, no murmur, click, rub or gallop. Abdomen:   Soft, non-tender. Bowel sounds normal. No masses,  No organomegaly. Extremities: Extremities normal, atraumatic, no cyanosis   1+ edema Pulses: 2+ and symmetric all extremities. Skin: Skin color, texture, turgor normal. No rashes or lesions Neurologic: CNII-XII intact. No gross focal deficits Data Review:  
   
Recent Days: 
Recent Labs  
  21 
1257 21 
0450 21 
1100 WBC  --  10.9 17.4* HGB 8.3* 8.4* 10.0* HCT 26.6* 26.7* 32.1*  
PLT  --  272 366 Recent Labs  
  21 
0450 21 
1100  140  
K 3.8 4.0  
* 106 CO2 20* 17* * 297* BUN 28* 32* CREA 1.95* 2.19* CA 7.9* 9.2 PHOS 3.3  --   
ALB 2.2* 3.1* TBILI  --  0.5 ALT  --  18 INR  --  1.1 Recent Labs  
  21 
1114 PH 7.33* PCO2 29* PO2 77 HCO3 15* FIO2 80.0  
 
 
24 Hour Results: 
Recent Results (from the past 24 hour(s)) HGB & HCT Collection Time: 21 12:57 PM  
Result Value Ref Range HGB 8.3 (L) 12.1 - 17.0 g/dL HCT 26.6 (L) 36.6 - 50.3 % GLUCOSE, POC Collection Time: 21  7:56 PM  
Result Value Ref Range Glucose (POC) 206 (H) 65 - 100 mg/dL  Performed by Neeraj Pickett GLUCOSE, POC Collection Time: 03/21/21  7:35 AM  
Result Value Ref Range Glucose (POC) 222 (H) 65 - 100 mg/dL Performed by David Weathers   
ECHO ADULT COMPLETE Collection Time: 03/21/21  8:58 AM  
Result Value Ref Range Pulmonic Regurgitant End Max Velocity 238.00 cm/s AoV PG 23.00 mmHg IVSd 1.69 (A) 0.60 - 1.00 cm LVIDd 4.65 4.20 - 5.90 cm LVIDs 4.33 cm Pulmonic Regurgitant End Max Velocity 87.00 cm/s LVOT Peak Gradient 3.00 mmHg LVPWd 1.50 (A) 0.60 - 1.00 cm LV E' Septal Velocity 4.97 cm/s LV ED Vol A2C 101.00 cm3 BP EF 19.6 (A) 55.0 - 100.0 % LV ES Vol A2C 81.20 cm3 E/E' septal 15.00 LV Ejection Fraction MOD 2C 7 % Mitral Valve E Wave Deceleration Time 143.00 ms MV A Jordon 113.00 cm/s  
 MV E Jordon 74.50 cm/s  
 MV E/A 0.70 Pulmonic Regurgitant End Max Velocity 122.00 cm/s Pulmonic Valve Systolic Peak Instantaneous Gradient 6.00 mmHg P Vein A Dur 127.00 ms Pulmonary Vein \"A\" Wave Velocity 38.90 cm/s RVIDd 3.26 cm Right Atrial Area 4C 19.44 cm2 XR CHEST PORT Final Result FINDINGS/IMPRESSION:  
Lungs similarly inflated. Cardiac silhouette stable in size. No radiographically  
apparent pneumothorax. Persistent but improvement of diffuse airspace opacity. This may represent  
multilobar pneumonia or edema, or perhaps a combination of each. XR CHEST PA LAT    (Results Pending) Assessment: 
Acute on chronic heart failure, appears to be the primary problem at this point. Possible component of underlying pneumonia as well Acute respiratory failure with hypoxia 
  
Accelerated hypertension 
  
Sepsis with tachycardia, leukocytosis and tachypnea 
  
Lactic acidosis due to hypoxia 
  
Diabetes mellitus type 2 
  
Chronic kidney disease stage III 
  
Anemia of chronic kidney disease Plan: 
Continue IV antibiotics Continue IV furosemide change to 60 mg twice daily Await echo Resume home blood pressure medications, continue with carvedilol Care Plan discussed with: Patient/Family and pulm Total time spent with patient: 30 minutes. Monae Alvarenga

## 2021-03-21 NOTE — MED STUDENT NOTES
Hospitalist Progress Note Daily Progress Note: 3/21/2021 Subjective: The patient is seen for follow up. Mr. Abner Sanchez is awake, alert, and comfortable. He states that while he is able to smell his food, he does not have an appetite. He reports a minimal cough. He denies fever, headache, and shortness of breath. He states that he has not had a bowel movement since admission. He also reports increased urinary frequency that is normal in composition with no presence of blood. Patient also states there is mild swelling of his right ankle as compared to his left ankle. He is happy as he talked to his family via [employer] yesterday evening. ProBNP yesterday, 3/20, was greater than 10,000. Blood glucose this morning, 3/21, is 222. Chest xray report from 3/20 reveals persistent but improvement of diffuse airspace opacity. Possibly represents multilobar pneumonia or edema, or perhaps a combination of each. Problem List: 
Problem List as of 3/21/2021 Never Reviewed Codes Class Noted - Resolved CAP (community acquired pneumonia) ICD-10-CM: J18.9 ICD-9-CM: 837  3/19/2021 - Present Medications reviewed Current Facility-Administered Medications Medication Dose Route Frequency  carvediloL (COREG) tablet 6.25 mg  6.25 mg Oral BID WITH MEALS  furosemide (LASIX) injection 60 mg  60 mg IntraVENous DAILY  sodium chloride (NS) flush 5-40 mL  5-40 mL IntraVENous Q8H  
 sodium chloride (NS) flush 5-40 mL  5-40 mL IntraVENous PRN  
 acetaminophen (TYLENOL) tablet 650 mg  650 mg Oral Q4H PRN  
 enoxaparin (LOVENOX) injection 40 mg  40 mg SubCUTAneous DAILY  piperacillin-tazobactam (ZOSYN) 3.375 g in 0.9% sodium chloride (MBP/ADV) 100 mL MBP  3.375 g IntraVENous Q8H  
 ondansetron (ZOFRAN) injection 4 mg  4 mg IntraVENous Q6H PRN  
 docusate sodium (COLACE) capsule 100 mg  100 mg Oral PRN  
 levoFLOXacin (LEVAQUIN) 750 mg in D5W IVPB  750 mg IntraVENous Q48H  
 
 
Review of Systems: A comprehensive review of systems was negative except for that written in the HPI. Objective:  
Physical Exam:  
 
Visit Vitals BP (!) 151/78 Pulse 85 Temp 99.3 °F (37.4 °C) Resp 20 Ht 5' 10\" (1.778 m) Wt 86.9 kg (191 lb 9.3 oz) SpO2 96% BMI 27.49 kg/m² O2 Flow Rate (L/min): 6 l/min O2 Device: Nasal cannula Temp (24hrs), Av.3 °F (37.9 °C), Min:99.3 °F (37.4 °C), Max:101.9 °F (38.8 °C) No intake/output data recorded.  1901 -  0700 In: 472 [P.O.:222; I.V.:250] Out: 2200 [Urine:2200] General:   Awake and alert Lungs:    Crackles bilateral.  Much improved from yesterday Chest wall:  No tenderness or deformity. Heart:  Regular rate and rhythm, S1, S2 normal, no murmur, click, rub or gallop. Abdomen:   Soft, non-tender. Bowel sounds normal. No masses,  No organomegaly. Extremities: Extremities normal, atraumatic, no cyanosis   1+ edema Pulses: 2+ and symmetric all extremities. Skin: Skin color, texture, turgor normal. No rashes or lesions Neurologic: CNII-XII intact. No gross focal deficits Data Review:  
   
Recent Days: 
Recent Labs  
  21 
1257 21 
0450 21 
1100 WBC  --  10.9 17.4* HGB 8.3* 8.4* 10.0* HCT 26.6* 26.7* 32.1*  
PLT  --  272 366 Recent Labs  
  21 
0450 21 
1100  140  
K 3.8 4.0  
* 106 CO2 20* 17* * 297* BUN 28* 32* CREA 1.95* 2.19* CA 7.9* 9.2 PHOS 3.3  --   
ALB 2.2* 3.1* TBILI  --  0.5 ALT  --  18 INR  --  1.1 Recent Labs  
  21 
1114 PH 7.33* PCO2 29* PO2 77 HCO3 15* FIO2 80.0  
 
 
24 Hour Results: 
Recent Results (from the past 24 hour(s)) HGB & HCT Collection Time: 21 12:57 PM  
Result Value Ref Range HGB 8.3 (L) 12.1 - 17.0 g/dL HCT 26.6 (L) 36.6 - 50.3 % GLUCOSE, POC Collection Time: 21  7:56 PM  
Result Value Ref Range Glucose (POC) 206 (H) 65 - 100 mg/dL Performed by Irasema Terry GLUCOSE, POC Collection Time: 03/21/21  7:35 AM  
Result Value Ref Range Glucose (POC) 222 (H) 65 - 100 mg/dL Performed by Yadira Ulrich   
ECHO ADULT COMPLETE Collection Time: 03/21/21  8:58 AM  
Result Value Ref Range Pulmonic Regurgitant End Max Velocity 238.00 cm/s AoV PG 23.00 mmHg IVSd 1.69 (A) 0.60 - 1.00 cm LVIDd 4.65 4.20 - 5.90 cm LVIDs 4.33 cm Pulmonic Regurgitant End Max Velocity 87.00 cm/s LVOT Peak Gradient 3.00 mmHg LVPWd 1.50 (A) 0.60 - 1.00 cm LV E' Septal Velocity 4.97 cm/s LV ED Vol A2C 101.00 cm3 BP EF 19.6 (A) 55.0 - 100.0 % LV ES Vol A2C 81.20 cm3 E/E' septal 15.00 LV Ejection Fraction MOD 2C 7 % Mitral Valve E Wave Deceleration Time 143.00 ms MV A Jordon 113.00 cm/s  
 MV E Jordon 74.50 cm/s  
 MV E/A 0.70 Pulmonic Regurgitant End Max Velocity 122.00 cm/s Pulmonic Valve Systolic Peak Instantaneous Gradient 6.00 mmHg P Vein A Dur 127.00 ms Pulmonary Vein \"A\" Wave Velocity 38.90 cm/s RVIDd 3.26 cm Right Atrial Area 4C 19.44 cm2 XR CHEST PORT Final Result FINDINGS/IMPRESSION:  
Lungs similarly inflated. Cardiac silhouette stable in size. No radiographically  
apparent pneumothorax. Persistent but improvement of diffuse airspace opacity. This may represent  
multilobar pneumonia or edema, or perhaps a combination of each. XR CHEST PA LAT    (Results Pending) Assessment: 
Acute on chronic heart failure, appears to be the primary problem at this point. Improving possible component of underlying pneumonia as well Acute respiratory failure with hypoxia 
  
Accelerated hypertension 
  
Sepsis with tachycardia, leukocytosis and tachypnea 
  
Lactic acidosis due to hypoxia 
  
Diabetes mellitus type 2 
  
Chronic kidney disease stage III 
  
Anemia of chronic kidney disease Plan: 
Continue IV antibiotics Continue IV furosemide Await echo Check two-view chest x-ray Care Plan discussed with: Patient/Family and pulm Total time spent with patient: 30 minutes.  
 
Venancio Schmitz MD

## 2021-03-21 NOTE — PROGRESS NOTES
Progress Note      3/21/2021 3:18 PM  NAME: Juwan Corcoran   MRN:  [id number]   Admit Diagnosis: CAP (community acquired pneumonia) [J18.9]      Problem List:   Acute respiratory failure with CHF decompensation  Sinus tachycardia likely related to above  Acute CHF decompensation, echo shows low EF  Type 2 diabetes  Chronic kidney disease  Essential hypertension  Chronic anemia     Assessment/Plan:   Echo shows severe LV dysfunction with EF less than 20%, therefore, he will need cardiac catheterization once his renal function stabilizes  Family says he has a history of congestive heart failure dating back at least 15 years  Add hydralazine and isosorbide for management of CHF         [x]       High complexity decision making was performed in this patient at high risk for decompensation with multiple organ involvement. Subjective:     Juwan Corcoran denies chest pain, dyspnea is better today. Discussed with his wife and daughter. Review of Systems:   Negative except for as noted above. Objective:      Physical Exam:    Last 24hrs VS reviewed since prior progress note. Most recent are:    Visit Vitals  BP (!) 151/78   Pulse 81   Temp 99.3 °F (37.4 °C)   Resp 20   Ht 5' 10\" (1.778 m)   Wt 86.9 kg (191 lb 9.3 oz)   SpO2 95%   BMI 27.49 kg/m²     No intake or output data in the 24 hours ending 03/21/21 1449     General Appearance: Alert; no acute distress. Ears/Nose/Mouth/Throat: Hearing grossly normal; moist mucous membranes  Neck: Supple. Chest: Bilateral coarse rhonchi. Cardiovascular: Regular rate and rhythm, S1S2 normal, soft systolic murmur  Abdomen: Soft, non-tender, bowel sounds are active. Extremities: No edema bilaterally. Skin: Warm and dry. []         Post-cath site without hematoma, bruit, tenderness, or thrill. Distal pulses intact.     PMH/SH reviewed - no change compared to H&P        Telemetry: Sinus rhythm      []  No new EKG for review    Lab Data Personally Reviewed:    Recent Labs     03/21/21  1155 03/20/21  1257 03/20/21  0450   WBC 9.4  --  10.9   HGB 8.4* 8.3* 8.4*   HCT 27.3* 26.6* 26.7*     --  272     Recent Labs     03/19/21  1100   INR 1.1   PTP 10.7      Recent Labs     03/21/21  1155 03/20/21  0450 03/19/21  1100    142 140   K 4.1 3.8 4.0    113* 106   CO2 25 20* 17*   BUN 38* 28* 32*   CREA 2.49* 1.95* 2.19*   * 133* 297*   CA 9.2 7.9* 9.2     Recent Labs     03/19/21  1100   TROIQ <0.05     No results found for: CHOL, CHOLX, CHLST, CHOLV, HDL, HDLP, LDL, LDLC, DLDLP, Keila Levels, CHHD, CHHDX    Recent Labs     03/21/21  1155 03/20/21  0450 03/19/21  1100   AP  --   --  77   TP  --   --  8.0   ALB 2.3* 2.2* 3.1*   GLOB  --   --  4.9*     Recent Labs     03/19/21  1114   PH 7.33*   PCO2 29*   PO2 77       Medications Personally Reviewed:    Current Facility-Administered Medications   Medication Dose Route Frequency    carvediloL (COREG) tablet 6.25 mg  6.25 mg Oral BID WITH MEALS    furosemide (LASIX) injection 60 mg  60 mg IntraVENous DAILY    sodium chloride (NS) flush 5-40 mL  5-40 mL IntraVENous Q8H    sodium chloride (NS) flush 5-40 mL  5-40 mL IntraVENous PRN    acetaminophen (TYLENOL) tablet 650 mg  650 mg Oral Q4H PRN    enoxaparin (LOVENOX) injection 40 mg  40 mg SubCUTAneous DAILY    piperacillin-tazobactam (ZOSYN) 3.375 g in 0.9% sodium chloride (MBP/ADV) 100 mL MBP  3.375 g IntraVENous Q8H    ondansetron (ZOFRAN) injection 4 mg  4 mg IntraVENous Q6H PRN    docusate sodium (COLACE) capsule 100 mg  100 mg Oral PRN    levoFLOXacin (LEVAQUIN) 750 mg in D5W IVPB  750 mg IntraVENous Q48H         Betancourt Meter, MD

## 2021-03-22 LAB
ALBUMIN SERPL-MCNC: 2.3 G/DL (ref 3.5–5)
ANION GAP SERPL CALC-SCNC: 9 MMOL/L (ref 5–15)
ATRIAL RATE: 107 BPM
BASOPHILS # BLD: 0 K/UL (ref 0–0.1)
BASOPHILS NFR BLD: 0 % (ref 0–1)
BNP SERPL-MCNC: ABNORMAL PG/ML
BUN SERPL-MCNC: 40 MG/DL (ref 6–20)
BUN/CREAT SERPL: 17 (ref 12–20)
CA-I BLD-MCNC: 9.3 MG/DL (ref 8.5–10.1)
CALCULATED P AXIS, ECG09: 37 DEGREES
CALCULATED R AXIS, ECG10: -27 DEGREES
CALCULATED T AXIS, ECG11: 153 DEGREES
CHLORIDE SERPL-SCNC: 106 MMOL/L (ref 97–108)
CO2 SERPL-SCNC: 23 MMOL/L (ref 21–32)
CREAT SERPL-MCNC: 2.32 MG/DL (ref 0.7–1.3)
CRP SERPL-MCNC: 21.5 MG/DL (ref 0–0.6)
DIAGNOSIS, 93000: NORMAL
DIFFERENTIAL METHOD BLD: ABNORMAL
EOSINOPHIL # BLD: 0.5 K/UL (ref 0–0.4)
EOSINOPHIL NFR BLD: 6 % (ref 0–7)
ERYTHROCYTE [DISTWIDTH] IN BLOOD BY AUTOMATED COUNT: 17.1 % (ref 11.5–14.5)
FERRITIN SERPL-MCNC: 1421 NG/ML (ref 26–388)
GLUCOSE BLD STRIP.AUTO-MCNC: 311 MG/DL (ref 65–100)
GLUCOSE SERPL-MCNC: 204 MG/DL (ref 65–100)
HCT VFR BLD AUTO: 28 % (ref 36.6–50.3)
HGB BLD-MCNC: 8.8 G/DL (ref 12.1–17)
IMM GRANULOCYTES # BLD AUTO: 0.1 K/UL (ref 0–0.04)
IMM GRANULOCYTES NFR BLD AUTO: 1 % (ref 0–0.5)
LDH SERPL L TO P-CCNC: 250 U/L (ref 85–241)
LYMPHOCYTES # BLD: 1.3 K/UL (ref 0.8–3.5)
LYMPHOCYTES NFR BLD: 14 % (ref 12–49)
MCH RBC QN AUTO: 23.5 PG (ref 26–34)
MCHC RBC AUTO-ENTMCNC: 31.4 G/DL (ref 30–36.5)
MCV RBC AUTO: 74.9 FL (ref 80–99)
MONOCYTES # BLD: 1 K/UL (ref 0–1)
MONOCYTES NFR BLD: 11 % (ref 5–13)
NEUTS SEG # BLD: 6 K/UL (ref 1.8–8)
NEUTS SEG NFR BLD: 68 % (ref 32–75)
P-R INTERVAL, ECG05: 185 MS
PERFORMED BY, TECHID: ABNORMAL
PHOSPHATE SERPL-MCNC: 3.5 MG/DL (ref 2.6–4.7)
PLATELET # BLD AUTO: 345 K/UL (ref 150–400)
POTASSIUM SERPL-SCNC: 3.6 MMOL/L (ref 3.5–5.1)
Q-T INTERVAL, ECG07: 353 MS
QRS DURATION, ECG06: 118 MS
QTC CALCULATION (BEZET), ECG08: 471 MS
RBC # BLD AUTO: 3.74 M/UL (ref 4.1–5.7)
SODIUM SERPL-SCNC: 138 MMOL/L (ref 136–145)
VENTRICULAR RATE, ECG03: 107 BPM
WBC # BLD AUTO: 8.7 K/UL (ref 4.1–11.1)

## 2021-03-22 PROCEDURE — 82728 ASSAY OF FERRITIN: CPT

## 2021-03-22 PROCEDURE — 36415 COLL VENOUS BLD VENIPUNCTURE: CPT

## 2021-03-22 PROCEDURE — 85025 COMPLETE CBC W/AUTO DIFF WBC: CPT

## 2021-03-22 PROCEDURE — 94760 N-INVAS EAR/PLS OXIMETRY 1: CPT

## 2021-03-22 PROCEDURE — 82962 GLUCOSE BLOOD TEST: CPT

## 2021-03-22 PROCEDURE — 86140 C-REACTIVE PROTEIN: CPT

## 2021-03-22 PROCEDURE — 74011250637 HC RX REV CODE- 250/637: Performed by: INTERNAL MEDICINE

## 2021-03-22 PROCEDURE — 83615 LACTATE (LD) (LDH) ENZYME: CPT

## 2021-03-22 PROCEDURE — 74011000258 HC RX REV CODE- 258: Performed by: INTERNAL MEDICINE

## 2021-03-22 PROCEDURE — 65270000029 HC RM PRIVATE

## 2021-03-22 PROCEDURE — 80069 RENAL FUNCTION PANEL: CPT

## 2021-03-22 PROCEDURE — 74011250636 HC RX REV CODE- 250/636: Performed by: INTERNAL MEDICINE

## 2021-03-22 PROCEDURE — 77010033678 HC OXYGEN DAILY

## 2021-03-22 PROCEDURE — 83880 ASSAY OF NATRIURETIC PEPTIDE: CPT

## 2021-03-22 RX ORDER — FUROSEMIDE 10 MG/ML
60 INJECTION INTRAMUSCULAR; INTRAVENOUS ONCE
Status: COMPLETED | OUTPATIENT
Start: 2021-03-22 | End: 2021-03-22

## 2021-03-22 RX ORDER — LEVOFLOXACIN 500 MG/1
500 TABLET, FILM COATED ORAL EVERY 24 HOURS
Status: DISCONTINUED | OUTPATIENT
Start: 2021-03-22 | End: 2021-03-24

## 2021-03-22 RX ADMIN — PIPERACILLIN AND TAZOBACTAM 3.38 G: 3; .375 INJECTION, POWDER, FOR SOLUTION INTRAVENOUS at 04:11

## 2021-03-22 RX ADMIN — ISOSORBIDE DINITRATE 10 MG: 10 TABLET ORAL at 20:14

## 2021-03-22 RX ADMIN — Medication 10 ML: at 16:42

## 2021-03-22 RX ADMIN — FUROSEMIDE 60 MG: 10 INJECTION, SOLUTION INTRAMUSCULAR; INTRAVENOUS at 06:41

## 2021-03-22 RX ADMIN — ISOSORBIDE DINITRATE 10 MG: 10 TABLET ORAL at 10:04

## 2021-03-22 RX ADMIN — Medication 10 ML: at 06:46

## 2021-03-22 RX ADMIN — CARVEDILOL 6.25 MG: 3.12 TABLET, FILM COATED ORAL at 16:40

## 2021-03-22 RX ADMIN — ENOXAPARIN SODIUM 40 MG: 100 INJECTION SUBCUTANEOUS at 10:04

## 2021-03-22 RX ADMIN — ISOSORBIDE DINITRATE 10 MG: 10 TABLET ORAL at 16:40

## 2021-03-22 RX ADMIN — Medication 10 ML: at 20:14

## 2021-03-22 RX ADMIN — LEVOFLOXACIN 500 MG: 500 TABLET, FILM COATED ORAL at 12:05

## 2021-03-22 RX ADMIN — HYDRALAZINE HYDROCHLORIDE 25 MG: 25 TABLET, FILM COATED ORAL at 10:04

## 2021-03-22 RX ADMIN — FUROSEMIDE 60 MG: 10 INJECTION, SOLUTION INTRAMUSCULAR; INTRAVENOUS at 12:05

## 2021-03-22 RX ADMIN — HYDRALAZINE HYDROCHLORIDE 25 MG: 25 TABLET, FILM COATED ORAL at 20:14

## 2021-03-22 RX ADMIN — CARVEDILOL 6.25 MG: 3.12 TABLET, FILM COATED ORAL at 10:04

## 2021-03-22 RX ADMIN — HYDRALAZINE HYDROCHLORIDE 25 MG: 25 TABLET, FILM COATED ORAL at 16:40

## 2021-03-22 NOTE — PROGRESS NOTES
Reason for Admission:  CAP                     RUR Score:          14           Plan for utilizing home health:      Patient declines  PCP: First and Last name:  Jayden Stanley MD     Name of Practice:    Are you a current patient: Yes/No:    Approximate date of last visit:    Can you participate in a virtual visit with your PCP:     Patient does not know the name of PCP [place] [facility] [place]. Current Advanced Directive/Advance Care Plan: Full Code      Healthcare Decision Maker:   Click here to complete HealthCare Decision Makers including selection of the Healthcare Decision Maker Relationship (ie \"Primary\")           Erik De Anda Spouse [phone] is surrogate decision maker. Transition of Care Plan:           Patient lives at home with spouse independently. Patient was working as a  until he got laid off in November. CM team will continue to follow case as needs arise. Current Disposition: Home Self.

## 2021-03-22 NOTE — MED STUDENT NOTES
Hospitalist Progress Note Daily Progress Note: 3/22/2021 Subjective: The patient is seen for follow up. Mr. Castro Wei is awake, alert, and comfortable. He states that he has some appetite and was able to eat dinner yesterday evening. He still reports a minimal cough. He denies fever, headache, and shortness of breath. He has not had a bowel movement since admission. He has no difficulties with urination and denies the presence of blood in urine. Patient notes that there is mild swelling of his right ankle. The left ankle edema has resolved. He was happy that his wife was able to visit him yesterday. ProBNP from 3/20, was greater than 10,000. Blood glucose trend for yesterday, 3/21: 222, 238, 186, and 188. Chest xray report from 3/20 reveals persistent but improvement of diffuse airspace opacity. Possibly represents multilobar pneumonia or edema, or perhaps a combination of each. Chest xray PA/Lat from 3/21 reveals senescent changes present in the thoracic spine. Echo report from 3/21 reveals: LVEF is 25%, moderate concentric hypertrophy, severely reduced systolic function, and left ventricular diastolic dysfunction due to impaired relaxation. LA is dilated. Mitral valve shows non-specific thickening. Mild mitral valve regurgitation is present. Mild tricuspid valve regurgitation is present. Problem List: 
Problem List as of 3/22/2021 Never Reviewed Codes Class Noted - Resolved CAP (community acquired pneumonia) ICD-10-CM: J18.9 ICD-9-CM: 200  3/19/2021 - Present Medications reviewed Current Facility-Administered Medications Medication Dose Route Frequency  hydrALAZINE (APRESOLINE) tablet 25 mg  25 mg Oral TID  isosorbide dinitrate (ISORDIL) tablet 10 mg  10 mg Oral TID  carvediloL (COREG) tablet 6.25 mg  6.25 mg Oral BID WITH MEALS  sodium chloride (NS) flush 5-40 mL  5-40 mL IntraVENous Q8H  
 sodium chloride (NS) flush 5-40 mL 5-40 mL IntraVENous PRN  
 acetaminophen (TYLENOL) tablet 650 mg  650 mg Oral Q4H PRN  
 enoxaparin (LOVENOX) injection 40 mg  40 mg SubCUTAneous DAILY  piperacillin-tazobactam (ZOSYN) 3.375 g in 0.9% sodium chloride (MBP/ADV) 100 mL MBP  3.375 g IntraVENous Q8H  
 ondansetron (ZOFRAN) injection 4 mg  4 mg IntraVENous Q6H PRN  
 docusate sodium (COLACE) capsule 100 mg  100 mg Oral PRN  
 levoFLOXacin (LEVAQUIN) 750 mg in D5W IVPB  750 mg IntraVENous Q48H Review of Systems: A comprehensive review of systems was negative except for that written in the HPI. Objective:  
Physical Exam:  
 
Visit Vitals BP (!) 145/84 Pulse 90 Temp 99.2 °F (37.3 °C) Resp 18 Ht 5' 10\" (1.778 m) Wt 86.9 kg (191 lb 9.3 oz) SpO2 96% BMI 27.49 kg/m² O2 Flow Rate (L/min): 3 l/min O2 Device: Nasal cannula Temp (24hrs), Av °F (37.2 °C), Min:98.7 °F (37.1 °C), Max:99.2 °F (37.3 °C) No intake/output data recorded.  1901 -  0700 In: 240 [P.O.:240] Out: 725 [Urine:725] General:   Awake and alert, comfortable Lungs:    Crackles bilateral.  Much improved from yesterday Chest wall:  No tenderness or deformity. Heart:  Regular rate and rhythm, S1, S2 normal, no murmur, click, rub or gallop. Abdomen:   Soft, non-tender. Bowel sounds normal. No masses,  No organomegaly. Extremities: Extremities normal, atraumatic, no cyanosis   1+ edema in right ankle. Left ankle edema resolved. Pulses: 2+ and symmetric all extremities. Skin: Skin color, texture, turgor normal. No rashes or lesions Neurologic: CNII-XII intact. No gross focal deficits Data Review:  
   
Recent Days: 
Recent Labs  
  21 
1155 21 
1257 21 
0450 21 
1100 WBC 9.4  --  10.9 17.4* HGB 8.4* 8.3* 8.4* 10.0* HCT 27.3* 26.6* 26.7* 32.1*  
  --  272 366 Recent Labs  
  21 
1155 21 
0450 21 
1100  142 140  
K 4.1 3.8 4.0  
 113* 106 CO2 25 20* 17* * 133* 297* BUN 38* 28* 32* CREA 2.49* 1.95* 2.19* CA 9.2 7.9* 9.2 PHOS 3.1 3.3  --   
ALB 2.3* 2.2* 3.1* TBILI  --   --  0.5 ALT  --   --  18 INR  --   --  1.1 Recent Labs  
  03/19/21 
1114 PH 7.33* PCO2 29* PO2 77 HCO3 15* FIO2 80.0  
 
 
24 Hour Results: 
Recent Results (from the past 24 hour(s)) GLUCOSE, POC Collection Time: 03/21/21 11:24 AM  
Result Value Ref Range Glucose (POC) 238 (H) 65 - 100 mg/dL Performed by Siva Hand   
CBC WITH AUTOMATED DIFF Collection Time: 03/21/21 11:55 AM  
Result Value Ref Range WBC 9.4 4.1 - 11.1 K/uL  
 RBC 3.60 (L) 4.10 - 5.70 M/uL HGB 8.4 (L) 12.1 - 17.0 g/dL HCT 27.3 (L) 36.6 - 50.3 % MCV 75.8 (L) 80.0 - 99.0 FL  
 MCH 23.3 (L) 26.0 - 34.0 PG  
 MCHC 30.8 30.0 - 36.5 g/dL  
 RDW 17.1 (H) 11.5 - 14.5 % PLATELET 264 991 - 351 K/uL NEUTROPHILS 72 32 - 75 % LYMPHOCYTES 12 12 - 49 % MONOCYTES 11 5 - 13 % EOSINOPHILS 4 0 - 7 % BASOPHILS 0 0 - 1 % IMMATURE GRANULOCYTES 1 (H) 0.0 - 0.5 % ABS. NEUTROPHILS 6.9 1.8 - 8.0 K/UL  
 ABS. LYMPHOCYTES 1.1 0.8 - 3.5 K/UL  
 ABS. MONOCYTES 1.0 0.0 - 1.0 K/UL  
 ABS. EOSINOPHILS 0.4 0.0 - 0.4 K/UL  
 ABS. BASOPHILS 0.0 0.0 - 0.1 K/UL  
 ABS. IMM. GRANS. 0.1 (H) 0.00 - 0.04 K/UL  
 DF AUTOMATED RENAL FUNCTION PANEL Collection Time: 03/21/21 11:55 AM  
Result Value Ref Range Sodium 140 136 - 145 mmol/L Potassium 4.1 3.5 - 5.1 mmol/L Chloride 107 97 - 108 mmol/L  
 CO2 25 21 - 32 mmol/L Anion gap 8 5 - 15 mmol/L Glucose 206 (H) 65 - 100 mg/dL BUN 38 (H) 6 - 20 mg/dL Creatinine 2.49 (H) 0.70 - 1.30 mg/dL BUN/Creatinine ratio 15 12 - 20 GFR est AA 31 (L) >60 ml/min/1.73m2 GFR est non-AA 26 (L) >60 ml/min/1.73m2 Calcium 9.2 8.5 - 10.1 mg/dL Phosphorus 3.1 2.6 - 4.7 mg/dL Albumin 2.3 (L) 3.5 - 5.0 g/dL GLUCOSE, POC Collection Time: 03/21/21  3:58 PM  
Result Value Ref Range  Glucose (POC) 186 (H) 65 - 100 mg/dL Performed by Chung Gallegos POC Collection Time: 03/21/21  7:38 PM  
Result Value Ref Range Glucose (POC) 188 (H) 65 - 100 mg/dL Performed by Mihaela Ramos   
 
 
XR CHEST PA LAT Final Result FINDINGS/IMPRESSION:  
Lungs similarly inflated when compared with the prior. Cardiac silhouette stable in size. Negative for pneumothorax. Persistent patchy bilateral airspace disease. This may represent multilobar  
pneumonia or edema, or perhaps a combination of each. Senescent changes are present in the thoracic spine. XR CHEST PORT Final Result FINDINGS/IMPRESSION:  
Lungs similarly inflated. Cardiac silhouette stable in size. No radiographically  
apparent pneumothorax. Persistent but improvement of diffuse airspace opacity. This may represent  
multilobar pneumonia or edema, or perhaps a combination of each. Assessment: 
Acute on chronic heart failure, appears to be the primary problem at this point. Improving possible component of underlying pneumonia as well Acute respiratory failure with hypoxia 
  
Accelerated hypertension 
  
Sepsis with tachycardia, leukocytosis and tachypnea 
  
Lactic acidosis due to hypoxia 
  
Diabetes mellitus type 2 
  
Chronic kidney disease stage III 
  
Anemia of chronic kidney disease Plan: 
Continue IV antibiotics Continue IV furosemide Care Plan discussed with: Patient/Family and pulm Total time spent with patient: 30 minutes. Ambika Foss

## 2021-03-22 NOTE — ROUTINE PROCESS
Bedside shift report given to Linda Inman RN  (oncoming nurse) by Lisa Lao RN (off going nurse). Report to include SBAR, plan of care, recent results, discharge planning, and patient's questions and concerns.

## 2021-03-22 NOTE — PROGRESS NOTES
Consult  Pulmonary, Critical Care    Name: More Mckeon MRN: [id number]   : 1950 Hospital: [address] [place] [address]   Date: 3/22/2021  Admission date: 3/19/2021 Hospital Day: 4       Subjective/Interval History:   Seen in the emergency room on nasal high flow oxygen at 80% with oxygen saturation 93% mildly labored respirations. He states that over the last 2 weeks he has been having worsening swelling of his ankles and worsening shortness of breath. Denies fever chills sweats has had some cough nonproductive. He went to the emergency room found to have severe diffuse pulmonary infiltrates was given 1 dose of Lasix placed on BiPAP and transferred to our facility. Of interest he states that 10 or 15 years ago he was hospitalized at [facility] with congestive heart failure had cardiac catheterization with no blockages and was told that medication would control his problem  3/20 feels much better respirations nonlabored have been able to decrease nasal high flow to 40%  3/21 respirations nonlabored on nasal oxygen 6 liters  3/22 continues to improve nasal oxygen down to 3 L with O2 sat 97% will decrease to 2 L  Hospital Problems  Never Reviewed          Codes Class Noted POA    CAP (community acquired pneumonia) ICD-10-CM: J18.9  ICD-9-CM: 180  3/19/2021 Unknown              IMPRESSION:   1. Acute hypoxic respiratory failure improved now on nasal oxygen 2 L  2. Pulmonary edema radiographically improved 3/20 we will repeat chest x-ray in a.m.  3. Diffuse pulmonary infiltrates probable all CHF but cannot rule out underlying infection WBC count normal will discontinue Zosyn and switch Levaquin to oral  4. Anemia  stable  5. Hypertension better control  6. Acute kidney injury creatinine fatemeh on yesterday's labs pending today we will continue Lasix 1 dose continue to follow renal function  7. Edema  8. Cardiomyopathy ejection fraction 25%  9. Diastolic dysfunction  Body mass index is 27.49 kg/m².   10. RECOMMENDATIONS/PLAN:   1. Continue Lasix and continue to follow creatinine  2. Await  echocardiogram  3. Supplemental O2 to keep sats > 93%  4. [x] High complexity decision making was performed  [x] See my orders for details      Subjective/Initial History:     I was asked by Chelsey Edwards MD to see Philip Tierney  a 80 y.o.  male in consultation for a chief complaint of acute hypoxic respiratory failure pulmonary edema    E  No Known Allergies     MAR reviewed and pertinent medications noted or modified as needed     Current Facility-Administered Medications   Medication    levoFLOXacin (LEVAQUIN) tablet 500 mg    furosemide (LASIX) injection 60 mg    hydrALAZINE (APRESOLINE) tablet 25 mg    isosorbide dinitrate (ISORDIL) tablet 10 mg    carvediloL (COREG) tablet 6.25 mg    sodium chloride (NS) flush 5-40 mL    sodium chloride (NS) flush 5-40 mL    acetaminophen (TYLENOL) tablet 650 mg    enoxaparin (LOVENOX) injection 40 mg    ondansetron (ZOFRAN) injection 4 mg    docusate sodium (COLACE) capsule 100 mg      Patient PCP: Jayden Stanley MD  PMH:  has a past medical history of CKD (chronic kidney disease) stage 3, GFR 30-59 ml/min, Diabetes ([address] Heart failure ([address] and Hypertension. PSH:   has no past surgical history on file. FHX: family history is not on file. SHX:  reports that he has never smoked. He has never used smokeless tobacco. He reports previous alcohol use.      Systemic review  General states his weight stable has not noted chronic fever chills or sweats at home  Eyes no double vision or momentary blindness  ENT no sinus congestion drainage or facial pain  Endocrinologic no polyuria polydipsia  Musculoskeletal no swollen tender joints  Neurologic no seizures or syncope  Gastrointestinal no nausea vomiting acid indigestion sour taste  Genitourinary no pain or discomfort on urination no bleeding  Cardiovascular has worsening edema over the last 2 weeks with shortness of breath and nonproductive cough.  Gives a history of having been hospitalized at [facility] 10 or 15 years ago with heart congestion and was started on medications  Respiratory increased shortness of breath nonproductive cough no fever chills or sweats    Objective:     Vital Signs: Telemetry:    normal sinus rhythm Intake/Output:   Visit Vitals  BP (!) 145/84   Pulse 90   Temp 99.2 °F (37.3 °C)   Resp 18   Ht 5' 10\" (1.778 m)   Wt 86.9 kg (191 lb 9.3 oz)   SpO2 96%   BMI 27.49 kg/m²       Temp (24hrs), Av °F (37.2 °C), Min:98.7 °F (37.1 °C), Max:99.2 °F (37.3 °C)        O2 Device: Nasal cannula O2 Flow Rate (L/min): 3 l/min       Wt Readings from Last 4 Encounters:   21 86.9 kg (191 lb 9.3 oz)          Intake/Output Summary (Last 24 hours) at 3/22/2021 1028  Last data filed at 3/22/2021 0653  Gross per 24 hour   Intake 240 ml   Output 725 ml   Net -485 ml       Last shift:      No intake/output data recorded.  Last 3 shifts:  1901 -  0700  In: 240 [P.O.:240]  Out: 725 [Urine:725]       Physical Exam:   General:  male; no distress laying in bed today  HEENT: NCAT, oral mucosa clear  Eyes: anicteric; conjunctiva clear extraocular movements intact  Neck: no nodes, JVD present no accessory muscle usage  Chest: no deformity,   Cardiac: Distant heart sounds seem regular no definite murmur no ankle edema today still has sacral edema  Lungs: Clear anteriorly with posterior rales in the bases  Abd: Soft positive bowel sounds no tenderness  Ext: No ankle edema still has sacral edema; no joint swelling; No clubbing  : clear urine  Neuro: Awake alert oriented moves all 4 extremities  Psych- no agitation, oriented to person;   Skin: warm, dry, no cyanosis;  Pulses: Radial radial femoral pulses intact  Capillary: Normal capillary refill    Labs:    Recent Labs     21  1155 21  1257 21  0450 21  1100   WBC 9.4  --  10.9 17.4*   HGB 8.4* 8.3* 8.4* 10.0*    --  272 366   INR  --   --   --  1.1     Recent Labs     03/21/21  1155 03/20/21  0450 03/19/21  1430 03/19/21  1100    142  --  140   K 4.1 3.8  --  4.0    113*  --  106   CO2 25 20*  --  17*   * 133*  --  297*   BUN 38* 28*  --  32*   CREA 2.49* 1.95*  --  2.19*   CA 9.2 7.9*  --  9.2   PHOS 3.1 3.3  --   --    LAC  --   --  1.4 4.4*   ALB 2.3* 2.2*  --  3.1*   ALT  --   --   --  18     Recent Labs     03/19/21  1114   PH 7.33*   PCO2 29*   PO2 77   HCO3 15*   FIO2 80.0   3/21 6 L nasal oxygen with oxygen saturation 95%  Recent Labs     03/19/21  1100   TROIQ <0.05     BNP 10,641  COVID-19 antigen negative  Echocardiogram with ejection fraction 65% diastolic dysfunction present  Imaging:    CXR Results  (Last 48 hours)  3/20 chest x-ray diffuse bilateral infiltrates persist but are markedly improved               03/19/21 1059  XR CHEST PORT Final result    Narrative:  Chest single view. Comparison single view chest May 1, 2020. Dense airspace opacity throughout the mid and lower lungs, new finding compared   to prior imaging. Suspect severe bilateral pneumonia. Cardiac silhouette   enlargement. No pneumothorax or sizable pleural effusion. To me it looks like severe pulmonary edema           Results from Hospital Encounter encounter on 03/19/21   XR CHEST PA LAT    Narrative XR CHEST PA LAT    Comparison: Chest radiograph dated March 20, 2021 at 0821 hours      Impression FINDINGS/IMPRESSION:  Lungs similarly inflated when compared with the prior. Cardiac silhouette stable in size. Negative for pneumothorax. Persistent patchy bilateral airspace disease. This may represent multilobar  pneumonia or edema, or perhaps a combination of each. Senescent changes are present in the thoracic spine. XR CHEST PORT    Narrative XR CHEST PORT    Comparison: Chest radiograph dated March 19, 2021      Impression FINDINGS/IMPRESSION:  Lungs similarly inflated.  Cardiac silhouette stable in size. No radiographically  apparent pneumothorax. Persistent but improvement of diffuse airspace opacity. This may represent  multilobar pneumonia or edema, or perhaps a combination of each. Results from [place] encounter on 03/19/21   XR CHEST PORT    Narrative Chest single view. Comparison single view chest May 1, 2020. Dense airspace opacity throughout the mid and lower lungs, new finding compared  to prior imaging. Suspect severe bilateral pneumonia. Cardiac silhouette  enlargement. No pneumothorax or sizable pleural effusion. He gives a history of heart disease in the past with what sounds like congestive heart failure and now has diffuse severe pulmonary infiltrates and hypoxia with no fever chills or sweats sounds more like pulmonary edema I will give extra dose Lasix. He does however have leukocytosis of a significant amount so agree with your empiric antibiotic for the time being. Echocardiogram has been ordered  3/20 looks more comfortable today nonlabored sitting at rest.  Have decreased nasal oxygen to 40%. Chest x-ray is markedly improved. Creatinine is slightly improved even with diuresis. 3/21 denies shortness of breath today states his appetite is poor but sense of smell and taste are normal.  Creatinine has risen today. Repeat hemoglobin yesterday and this morning are stable  3/22 looks comfortable in bed oxygen has been decreased to 2 L. Still has a few rales present. Yesterday's creatinine was elevated today is pending.   We will give Lasix today repeat chest x-ray tomorrow and labs    Charmaine Mueller MD

## 2021-03-22 NOTE — PROGRESS NOTES
Hospitalist Progress Note               Daily Progress Note: 3/22/2021      Subjective: The patient is seen for follow up. Mr. Estrada Said is awake, alert, and comfortable. He states that he has some appetite and was able to eat dinner yesterday evening. He still reports a minimal cough. He denies fever, headache, and shortness of breath. He has not had a bowel movement since admission. He has no difficulties with urination and denies the presence of blood in urine. Patient notes that there is mild swelling of his right ankle. The left ankle edema has resolved. He was happy that his wife was able to visit him yesterday. ProBNP from 3/20, was greater than 10,000. Blood glucose trend for yesterday, 3/21: 222, 238, 186, and 188. Chest xray report from 3/20 reveals persistent but improvement of diffuse airspace opacity. Possibly represents multilobar pneumonia or edema, or perhaps a combination of each. Chest xray PA/Lat from 3/21 reveals senescent changes present in the thoracic spine. Echo report from 3/21 reveals: LVEF is 25%, moderate concentric hypertrophy, severely reduced systolic function, and left ventricular diastolic dysfunction due to impaired relaxation. LA is dilated. Mitral valve shows non-specific thickening. Mild mitral valve regurgitation is present. Mild tricuspid valve regurgitation is present. Per Dr. Yas Olivier, patient's systolic dysfunction is longstanding    His creatinine yesterday was up to 2.49, this morning 2.3.   I have held his diuretics    Problem List:  Problem List as of 3/22/2021 Never Reviewed          Codes Class Noted - Resolved    CAP (community acquired pneumonia) ICD-10-CM: J18.9  ICD-9-CM: 936  3/19/2021 - Present              Medications reviewed  Current Facility-Administered Medications   Medication Dose Route Frequency    levoFLOXacin (LEVAQUIN) tablet 500 mg  500 mg Oral Q24H    hydrALAZINE (APRESOLINE) tablet 25 mg  25 mg Oral TID    isosorbide dinitrate (ISORDIL) tablet 10 mg  10 mg Oral TID    carvediloL (COREG) tablet 6.25 mg  6.25 mg Oral BID WITH MEALS    sodium chloride (NS) flush 5-40 mL  5-40 mL IntraVENous Q8H    sodium chloride (NS) flush 5-40 mL  5-40 mL IntraVENous PRN    acetaminophen (TYLENOL) tablet 650 mg  650 mg Oral Q4H PRN    enoxaparin (LOVENOX) injection 40 mg  40 mg SubCUTAneous DAILY    ondansetron (ZOFRAN) injection 4 mg  4 mg IntraVENous Q6H PRN    docusate sodium (COLACE) capsule 100 mg  100 mg Oral PRN       Review of Systems:   A comprehensive review of systems was negative except for that written in the HPI. Objective:   Physical Exam:     Visit Vitals  BP (!) 145/84   Pulse 90   Temp 99.2 °F (37.3 °C)   Resp 18   Ht 5' 10\" (1.778 m)   Wt 86.9 kg (191 lb 9.3 oz)   SpO2 96%   BMI 27.49 kg/m²    O2 Flow Rate (L/min): 3 l/min O2 Device: Nasal cannula    Temp (24hrs), Av °F (37.2 °C), Min:98.7 °F (37.1 °C), Max:99.2 °F (37.3 °C)     07 - 1900  In: -   Out: 650 [Urine:650]   1901 -  0700  In: 240 [P.O.:240]  Out: 725 [Urine:725]    General:   Awake and alert, comfortable   Lungs:    Clear lung fields bilateral   Chest wall:  No tenderness or deformity. Heart:  Regular rate and rhythm, S1, S2 normal, no murmur, click, rub or gallop. Abdomen:   Soft, non-tender. Bowel sounds normal. No masses,  No organomegaly. Extremities: Extremities normal, atraumatic, no cyanosis   1+ edema in right ankle. Left ankle edema resolved. Pulses: 2+ and symmetric all extremities. Skin: Skin color, texture, turgor normal. No rashes or lesions   Neurologic: CNII-XII intact.   No gross focal deficits         Data Review:       Recent Days:  Recent Labs     21  0915 21  1155 21  1257 21  0450   WBC 8.7 9.4  --  10.9   HGB 8.8* 8.4* 8.3* 8.4*   HCT 28.0* 27.3* 26.6* 26.7*    359  --  272     Recent Labs     21  0915 21  1155 21  0450    140 142   K 3.6 4.1 3.8    107 113*   CO2 23 25 20*   * 206* 133*   BUN 40* 38* 28*   CREA 2.32* 2.49* 1.95*   CA 9.3 9.2 7.9*   PHOS 3.5 3.1 3.3   ALB 2.3* 2.3* 2.2*     No results for input(s): PH, PCO2, PO2, HCO3, FIO2 in the last 72 hours.     24 Hour Results:  Recent Results (from the past 24 hour(s))   GLUCOSE, POC    Collection Time: 03/21/21  3:58 PM   Result Value Ref Range    Glucose (POC) 186 (H) 65 - 100 mg/dL    Performed by [facility] [employer] [place], POC    Collection Time: 03/21/21  7:38 PM   Result Value Ref Range    Glucose (POC) 188 (H) 65 - 100 mg/dL    Performed by Aida Fowler    C REACTIVE PROTEIN, QT    Collection Time: 03/22/21  9:15 AM   Result Value Ref Range    C-Reactive protein 21.50 (H) 0.00 - 0.60 mg/dL   LD    Collection Time: 03/22/21  9:15 AM   Result Value Ref Range     (H) 85 - 241 U/L   RENAL FUNCTION PANEL    Collection Time: 03/22/21  9:15 AM   Result Value Ref Range    Sodium 138 136 - 145 mmol/L    Potassium 3.6 3.5 - 5.1 mmol/L    Chloride 106 97 - 108 mmol/L    CO2 23 21 - 32 mmol/L    Anion gap 9 5 - 15 mmol/L    Glucose 204 (H) 65 - 100 mg/dL    BUN 40 (H) 6 - 20 mg/dL    Creatinine 2.32 (H) 0.70 - 1.30 mg/dL    BUN/Creatinine ratio 17 12 - 20      GFR est AA 34 (L) >60 ml/min/1.73m2    GFR est non-AA 28 (L) >60 ml/min/1.73m2    Calcium 9.3 8.5 - 10.1 mg/dL    Phosphorus 3.5 2.6 - 4.7 mg/dL    Albumin 2.3 (L) 3.5 - 5.0 g/dL   BNP    Collection Time: 03/22/21  9:15 AM   Result Value Ref Range    NT pro-BNP 17,104 (H) <125 pg/mL   CBC WITH AUTOMATED DIFF    Collection Time: 03/22/21  9:15 AM   Result Value Ref Range    WBC 8.7 4.1 - 11.1 K/uL    RBC 3.74 (L) 4.10 - 5.70 M/uL    HGB 8.8 (L) 12.1 - 17.0 g/dL    HCT 28.0 (L) 36.6 - 50.3 %    MCV 74.9 (L) 80.0 - 99.0 FL    MCH 23.5 (L) 26.0 - 34.0 PG    MCHC 31.4 30.0 - 36.5 g/dL    RDW 17.1 (H) 11.5 - 14.5 %    PLATELET 267 485 - 073 K/uL    NEUTROPHILS 68 32 - 75 %    LYMPHOCYTES 14 12 - 49 %    MONOCYTES 11 5 - 13 % EOSINOPHILS 6 0 - 7 %    BASOPHILS 0 0 - 1 %    IMMATURE GRANULOCYTES 1 (H) 0.0 - 0.5 %    ABS. NEUTROPHILS 6.0 1.8 - 8.0 K/UL    ABS. LYMPHOCYTES 1.3 0.8 - 3.5 K/UL    ABS. MONOCYTES 1.0 0.0 - 1.0 K/UL    ABS. EOSINOPHILS 0.5 (H) 0.0 - 0.4 K/UL    ABS. BASOPHILS 0.0 0.0 - 0.1 K/UL    ABS. IMM. GRANS. 0.1 (H) 0.00 - 0.04 K/UL    DF AUTOMATED         XR CHEST PA LAT   Final Result   FINDINGS/IMPRESSION:   Lungs similarly inflated when compared with the prior. Cardiac silhouette stable in size. Negative for pneumothorax. Persistent patchy bilateral airspace disease. This may represent multilobar   pneumonia or edema, or perhaps a combination of each. Senescent changes are present in the thoracic spine. XR CHEST PORT   Final Result   FINDINGS/IMPRESSION:   Lungs similarly inflated. Cardiac silhouette stable in size. No radiographically   apparent pneumothorax. Persistent but improvement of diffuse airspace opacity. This may represent   multilobar pneumonia or edema, or perhaps a combination of each. XR CHEST PA LAT    (Results Pending)        Assessment:  Acute on chronic systolic heart failure, EF 25%. Improving, patient does not appear volume overloaded currently    Possible component of bilateral community-acquired pneumonia    Acute respiratory failure with hypoxia, improving     Accelerated hypertension     Sepsis with tachycardia, leukocytosis and tachypnea     Lactic acidosis due to hypoxia     Diabetes mellitus type 2     Chronic kidney disease stage III     Anemia of chronic kidney disease      Plan:  Continue IV antibiotics  Stop furosemide  Wean oxygen  Repeat chest x-ray in a.m. Possible discharge in 24 hours      Care Plan discussed with: Patient/Family and pulm    Total time spent with patient: 30 minutes.     Harris Guaman MD

## 2021-03-23 ENCOUNTER — APPOINTMENT (OUTPATIENT)
Dept: GENERAL RADIOLOGY | Age: 71
DRG: 871 | End: 2021-03-23
Attending: INTERNAL MEDICINE
Payer: MEDICARE

## 2021-03-23 LAB
GLUCOSE BLD STRIP.AUTO-MCNC: 214 MG/DL (ref 65–100)
GLUCOSE BLD STRIP.AUTO-MCNC: 247 MG/DL (ref 65–100)
MAGNESIUM SERPL-MCNC: 2.1 MG/DL (ref 1.6–2.4)
PERFORMED BY, TECHID: ABNORMAL
PERFORMED BY, TECHID: ABNORMAL

## 2021-03-23 PROCEDURE — 71046 X-RAY EXAM CHEST 2 VIEWS: CPT

## 2021-03-23 PROCEDURE — 74011250637 HC RX REV CODE- 250/637: Performed by: INTERNAL MEDICINE

## 2021-03-23 PROCEDURE — 83735 ASSAY OF MAGNESIUM: CPT

## 2021-03-23 PROCEDURE — 36415 COLL VENOUS BLD VENIPUNCTURE: CPT

## 2021-03-23 PROCEDURE — 74011250636 HC RX REV CODE- 250/636: Performed by: INTERNAL MEDICINE

## 2021-03-23 PROCEDURE — 65270000029 HC RM PRIVATE

## 2021-03-23 PROCEDURE — 82962 GLUCOSE BLOOD TEST: CPT

## 2021-03-23 RX ORDER — FUROSEMIDE 40 MG/1
60 TABLET ORAL DAILY
Status: DISCONTINUED | OUTPATIENT
Start: 2021-03-23 | End: 2021-03-25 | Stop reason: HOSPADM

## 2021-03-23 RX ORDER — CARVEDILOL 12.5 MG/1
12.5 TABLET ORAL 2 TIMES DAILY WITH MEALS
Status: DISCONTINUED | OUTPATIENT
Start: 2021-03-23 | End: 2021-03-25 | Stop reason: HOSPADM

## 2021-03-23 RX ORDER — LANOLIN ALCOHOL/MO/W.PET/CERES
400 CREAM (GRAM) TOPICAL 2 TIMES DAILY
Status: DISCONTINUED | OUTPATIENT
Start: 2021-03-23 | End: 2021-03-25 | Stop reason: HOSPADM

## 2021-03-23 RX ADMIN — ENOXAPARIN SODIUM 40 MG: 100 INJECTION SUBCUTANEOUS at 09:37

## 2021-03-23 RX ADMIN — Medication 10 ML: at 20:24

## 2021-03-23 RX ADMIN — CARVEDILOL 6.25 MG: 3.12 TABLET, FILM COATED ORAL at 09:37

## 2021-03-23 RX ADMIN — HYDRALAZINE HYDROCHLORIDE 25 MG: 25 TABLET, FILM COATED ORAL at 09:37

## 2021-03-23 RX ADMIN — LEVOFLOXACIN 500 MG: 500 TABLET, FILM COATED ORAL at 11:52

## 2021-03-23 RX ADMIN — CARVEDILOL 12.5 MG: 12.5 TABLET, FILM COATED ORAL at 16:22

## 2021-03-23 RX ADMIN — ISOSORBIDE DINITRATE 10 MG: 10 TABLET ORAL at 20:25

## 2021-03-23 RX ADMIN — Medication 10 ML: at 14:28

## 2021-03-23 RX ADMIN — ISOSORBIDE DINITRATE 10 MG: 10 TABLET ORAL at 09:37

## 2021-03-23 RX ADMIN — HYDRALAZINE HYDROCHLORIDE 25 MG: 25 TABLET, FILM COATED ORAL at 20:25

## 2021-03-23 RX ADMIN — ISOSORBIDE DINITRATE 10 MG: 10 TABLET ORAL at 16:22

## 2021-03-23 RX ADMIN — FUROSEMIDE 60 MG: 40 TABLET ORAL at 14:26

## 2021-03-23 RX ADMIN — Medication 400 MG: at 09:37

## 2021-03-23 RX ADMIN — HYDRALAZINE HYDROCHLORIDE 25 MG: 25 TABLET, FILM COATED ORAL at 16:22

## 2021-03-23 RX ADMIN — Medication 400 MG: at 20:25

## 2021-03-23 NOTE — PROGRESS NOTES
Progress Note      3/23/2021 3:18 PM  NAME: Christiane Christianson   MRN:  [id number]   Admit Diagnosis: CAP (community acquired pneumonia) [J18.9]      Problem List:   Acute CHF decompensation   Systolic HF with EF 28%  Type 2 diabetes  Chronic kidney disease  Essential hypertension  Chronic anemia     Assessment/Plan:   Echo shows severe LV dysfunction with EF 25%  Hold off on cath until renal function stabilizes; can do as outpatient  Family says he has a history of CHF dating back at least 15 years  Keep hydralazine and isosorbide for management of CHF  Outpatient follow-up with me and  I will arrange cath in a few weeks if nephrology agrees         [x]       High complexity decision making was performed in this patient at high risk for decompensation with multiple organ involvement. Subjective:     Christiane Christianson denies chest pain, dyspnea is better today. Discussed with his wife and daughter. Review of Systems:   Negative except for as noted above. Objective:      Physical Exam:    Last 24hrs VS reviewed since prior progress note. Most recent are:    Visit Vitals  BP (!) 165/98   Pulse 94   Temp 99.2 °F (37.3 °C)   Resp 22   Ht 5' 10\" (1.778 m)   Wt 84.8 kg (186 lb 15.2 oz)   SpO2 98%   BMI 26.82 kg/m²       Intake/Output Summary (Last 24 hours) at 3/23/2021 1243  Last data filed at 3/23/2021 1029  Gross per 24 hour   Intake    Output 250 ml   Net -250 ml        General Appearance: Alert; no acute distress. Ears/Nose/Mouth/Throat: Hearing grossly normal; moist mucous membranes  Neck: Supple. Chest: Bilateral coarse rhonchi. Cardiovascular: Regular rate and rhythm, S1S2 normal, soft systolic murmur  Abdomen: Soft, non-tender, bowel sounds are active. Extremities: No edema bilaterally. Skin: Warm and dry. []         Post-cath site without hematoma, bruit, tenderness, or thrill. Distal pulses intact.     PMH/SH reviewed - no change compared to H&P        Telemetry: Sinus rhythm      []  No new EKG for review    Lab Data Personally Reviewed:    Recent Labs     03/22/21  0915 03/21/21  1155   WBC 8.7 9.4   HGB 8.8* 8.4*   HCT 28.0* 27.3*    359     No results for input(s): INR, PTP, APTT, INREXT, INREXT in the last 72 hours. Recent Labs     03/23/21  1046 03/22/21  0915 03/21/21  1155   NA  --  138 140   K  --  3.6 4.1   CL  --  106 107   CO2  --  23 25   BUN  --  40* 38*   CREA  --  2.32* 2.49*   GLU  --  204* 206*   CA  --  9.3 9.2   MG 2.1  --   --      No results for input(s): CPK, CKNDX, TROIQ in the last 72 hours. No lab exists for component: CPKMB  No results found for: CHOL, CHOLX, CHLST, CHOLV, HDL, HDLP, LDL, LDLC, DLDLP, TGLX, TRIGL, TRIGP, CHHD, CHHDX    Recent Labs     03/22/21  0915 03/21/21  1155   ALB 2.3* 2.3*     No results for input(s): PH, PCO2, PO2 in the last 72 hours.     Medications Personally Reviewed:    Current Facility-Administered Medications   Medication Dose Route Frequency    magnesium oxide (MAG-OX) tablet 400 mg  400 mg Oral BID    carvediloL (COREG) tablet 12.5 mg  12.5 mg Oral BID WITH MEALS    furosemide (LASIX) tablet 60 mg  60 mg Oral DAILY    levoFLOXacin (LEVAQUIN) tablet 500 mg  500 mg Oral Q24H    hydrALAZINE (APRESOLINE) tablet 25 mg  25 mg Oral TID    isosorbide dinitrate (ISORDIL) tablet 10 mg  10 mg Oral TID    sodium chloride (NS) flush 5-40 mL  5-40 mL IntraVENous Q8H    sodium chloride (NS) flush 5-40 mL  5-40 mL IntraVENous PRN    acetaminophen (TYLENOL) tablet 650 mg  650 mg Oral Q4H PRN    enoxaparin (LOVENOX) injection 40 mg  40 mg SubCUTAneous DAILY    ondansetron (ZOFRAN) injection 4 mg  4 mg IntraVENous Q6H PRN    docusate sodium (COLACE) capsule 100 mg  100 mg Oral PRN         Km Mckee MD

## 2021-03-23 NOTE — PROGRESS NOTES
Consult  Pulmonary, Critical Care    Name: Nakia Pfeiffer MRN: [id number]   : 1950 Hospital: [facility]   Date: 3/23/2021  Admission date: 3/19/2021 Hospital Day: 5       Subjective/Interval History:   Seen in the emergency room on nasal high flow oxygen at 80% with oxygen saturation 93% mildly labored respirations. He states that over the last 2 weeks he has been having worsening swelling of his ankles and worsening shortness of breath. Denies fever chills sweats has had some cough nonproductive. He went to the emergency room found to have severe diffuse pulmonary infiltrates was given 1 dose of Lasix placed on BiPAP and transferred to our facility. Of interest he states that 10 or 15 years ago he was hospitalized at [facility] with congestive heart failure had cardiac catheterization with no blockages and was told that medication would control his problem  3/20 feels much better respirations nonlabored have been able to decrease nasal high flow to 40%  3/21 respirations nonlabored on nasal oxygen 6 liters  3/23 doing well with no respiratory distress. Currently on room air with oxygen saturation 98%  Hospital Problems  Never Reviewed          Codes Class Noted POA    CAP (community acquired pneumonia) ICD-10-CM: J18.9  ICD-9-CM: 910  3/19/2021 Unknown              IMPRESSION:   1. Acute hypoxic respiratory failure improved now on room air  2. Pulmonary edema radiographically resolved on current chest x-ray  3. Diffuse pulmonary infiltrates probable all CHF almost complete clearing on today's x-ray  4. Anemia  stable  5. Hypertension better control  6. Acute kidney injury creatinine had risen 3/21 but it was improved 3/22 no labs available today  7. Edema  8. Cardiomyopathy ejection fraction 25%  9. Diastolic dysfunction  Body mass index is 26.82 kg/m². 10.       RECOMMENDATIONS/PLAN:   1. Continue Lasix and continue to follow creatinine no labs available today    2.  Now on room air if remains hospitalized tonight we will do overnight oximetry on room air          [x] High complexity decision making was performed  [x] See my orders for details      Subjective/Initial History:     I was asked by Scarlett Canavan, MD to see Corey Campbell  a 80 y.o.  male in consultation for a chief complaint of acute hypoxic respiratory failure pulmonary edema    E  No Known Allergies     MAR reviewed and pertinent medications noted or modified as needed     Current Facility-Administered Medications   Medication    magnesium oxide (MAG-OX) tablet 400 mg    carvediloL (COREG) tablet 12.5 mg    levoFLOXacin (LEVAQUIN) tablet 500 mg    hydrALAZINE (APRESOLINE) tablet 25 mg    isosorbide dinitrate (ISORDIL) tablet 10 mg    sodium chloride (NS) flush 5-40 mL    sodium chloride (NS) flush 5-40 mL    acetaminophen (TYLENOL) tablet 650 mg    enoxaparin (LOVENOX) injection 40 mg    ondansetron (ZOFRAN) injection 4 mg    docusate sodium (COLACE) capsule 100 mg      Patient PCP: Other, MD Jayden  PMH:  has a past medical history of CKD (chronic kidney disease) stage 3, GFR 30-59 ml/min, Diabetes ([address] Heart failure ([address] and Hypertension. PSH:   has no past surgical history on file. FHX: family history is not on file. SHX:  reports that he has never smoked. He has never used smokeless tobacco. He reports previous alcohol use. Systemic review  General states his weight stable has not noted chronic fever chills or sweats at home  Eyes no double vision or momentary blindness  ENT no sinus congestion drainage or facial pain  Endocrinologic no polyuria polydipsia  Musculoskeletal no swollen tender joints  Neurologic no seizures or syncope  Gastrointestinal no nausea vomiting acid indigestion sour taste  Genitourinary no pain or discomfort on urination no bleeding  Cardiovascular has worsening edema over the last 2 weeks with shortness of breath and nonproductive cough.   Gives a history of having been hospitalized at [facility] 10 or 15 years ago with heart congestion and was started on medications  Respiratory increased shortness of breath nonproductive cough no fever chills or sweats    Objective:     Vital Signs: Telemetry:    normal sinus rhythm Intake/Output:   Visit Vitals  BP (!) 165/98   Pulse 94   Temp 99.2 °F (37.3 °C)   Resp 22   Ht 5' 10\" (1.778 m)   Wt 84.8 kg (186 lb 15.2 oz)   SpO2 98%   BMI 26.82 kg/m²       Temp (24hrs), Av.1 °F (37.3 °C), Min:98.5 °F (36.9 °C), Max:99.6 °F (37.6 °C)        O2 Device: Room air O2 Flow Rate (L/min): 3 l/min       Wt Readings from Last 4 Encounters:   21 84.8 kg (186 lb 15.2 oz)          Intake/Output Summary (Last 24 hours) at 3/23/2021 1230  Last data filed at 3/23/2021 1029  Gross per 24 hour   Intake    Output 250 ml   Net -250 ml       Last shift:       07 -  190  In: -   Out: 250 [Urine:250]  Last 3 shifts: 1901 -  0700  In: 240 [P.O.:240]  Out: 0064 [Urine:1375]       Physical Exam:   General:  male; no distress laying in bed today on room air  HEENT: NCAT, oral mucosa clear  Eyes: anicteric; conjunctiva clear extraocular movements intact  Neck: no nodes, neck veins look flat  Chest: no deformity,   Cardiac: Distant heart sounds seem regular no definite murmur no ankle edema today still has sacral edema  Lungs: Clear anteriorly laterally and upper lungs posteriorly with just a few rales in the bases  Abd: Soft positive bowel sounds no tenderness  Ext: No ankle edema still has sacral edema; no joint swelling;  No clubbing  : clear urine  Neuro: Awake alert oriented moves all 4 extremities  Psych- no agitation, oriented to person;   Skin: warm, dry, no cyanosis;  Pulses: Radial radial femoral pulses intact  Capillary: Normal capillary refill    Labs:    Recent Labs     21  0915 21  1155 21  1257   WBC 8.7 9.4  --    HGB 8.8* 8.4* 8.3*    359  --      Recent Labs 03/23/21  1046 03/22/21  0915 03/21/21  1155   NA  --  138 140   K  --  3.6 4.1   CL  --  106 107   CO2  --  23 25   GLU  --  204* 206*   BUN  --  40* 38*   CREA  --  2.32* 2.49*   CA  --  9.3 9.2   MG 2.1  --   --    PHOS  --  3.5 3.1   ALB  --  2.3* 2.3*     Room air oxygen saturation 98%  BNP 17,100 10,641  COVID-19 antigen negative  Echocardiogram with ejection fraction 36% diastolic dysfunction present  Imaging:    CXR Results  (Last 48 hours)  3/23 chest x-ray Marked clearing of infiltrates minimal remaining  3/20 chest x-ray diffuse bilateral infiltrates persist but are markedly improved               03/19/21 1059  XR CHEST PORT Final result    Narrative:  Chest single view. Comparison single view chest May 1, 2020. Dense airspace opacity throughout the mid and lower lungs, new finding compared   to prior imaging. Suspect severe bilateral pneumonia. Cardiac silhouette   enlargement. No pneumothorax or sizable pleural effusion. To me it looks like severe pulmonary edema           Results from Hospital Encounter encounter on 03/19/21   XR CHEST PA LAT    Narrative Chest 2 views. Comparison single view chest March 21, 2021    Airspace opacity throughout each lung is less dense compared to prior imaging. Degree improved aeration compared to prior imaging. Cardiac and mediastinal  structures unchanged. No pneumothorax or sizable pleural effusion. XR CHEST PA LAT    Narrative XR CHEST PA LAT    Comparison: Chest radiograph dated March 20, 2021 at 0821 hours      Impression FINDINGS/IMPRESSION:  Lungs similarly inflated when compared with the prior. Cardiac silhouette stable in size. Negative for pneumothorax. Persistent patchy bilateral airspace disease. This may represent multilobar  pneumonia or edema, or perhaps a combination of each. Senescent changes are present in the thoracic spine.    XR CHEST PORT    Narrative XR CHEST PORT    Comparison: Chest radiograph dated March 19, 2021      Impression FINDINGS/IMPRESSION:  Lungs similarly inflated. Cardiac silhouette stable in size. No radiographically  apparent pneumothorax. Persistent but improvement of diffuse airspace opacity. This may represent  multilobar pneumonia or edema, or perhaps a combination of each. He gives a history of heart disease in the past with what sounds like congestive heart failure and now has diffuse severe pulmonary infiltrates and hypoxia with no fever chills or sweats sounds more like pulmonary edema I will give extra dose Lasix. He does however have leukocytosis of a significant amount so agree with your empiric antibiotic for the time being. Echocardiogram has been ordered  3/20 looks more comfortable today nonlabored sitting at rest.  Have decreased nasal oxygen to 40%. Chest x-ray is markedly improved. Creatinine is slightly improved even with diuresis. 3/21 denies shortness of breath today states his appetite is poor but sense of smell and taste are normal.  Creatinine has risen today. Repeat hemoglobin yesterday and this morning are stable  3/22 looks comfortable in bed oxygen has been decreased to 2 L. Still has a few rales present. Yesterday's creatinine was elevated today is pending. We will give Lasix today repeat chest x-ray tomorrow and labs  3/23 labs pending today he is comfortable today on room air. Chest x-ray markedly improved. 3/21 creatinine had elevated 3/22 had improved. BNP remains elevated 17,000.   Lasix was discontinued will resume it orally    Elizabeth Ramos MD

## 2021-03-23 NOTE — PROGRESS NOTES
Patient noted to be having Cheyne-costa respiration (periods of apnea alternating with periods of tachypnea) during sleep. SpO2 maintaining above 95% on room air. Dr. Mishel Childs informed. Patient to be assessed for need for BiPAP in the morning. To continue monitoring respiratory status over night.

## 2021-03-23 NOTE — MED STUDENT NOTES
Hospitalist Progress Note Vincent Spangler Daily Progress Note: 3/23/2021 Subjective: The patient is seen for follow up. Preston Stark is a 80year old male with a PMH of CHF, HTN, CKD, Diabetes who initially presented with shortness of breath. Patient is being followed by pulmonology and  was treated with lasix and bipap. Patient is now out of the ICU. Patient was awake and alert. Patient some elevate of creatine in last 2 days. Patient denies worsening shortness of breath, fever, headache. Problem List: 
Problem List as of 3/23/2021 Never Reviewed Codes Class Noted - Resolved CAP (community acquired pneumonia) ICD-10-CM: J18.9 ICD-9-CM: 298  3/19/2021 - Present Medications reviewed Current Facility-Administered Medications Medication Dose Route Frequency  magnesium oxide (MAG-OX) tablet 400 mg  400 mg Oral BID  levoFLOXacin (LEVAQUIN) tablet 500 mg  500 mg Oral Q24H  hydrALAZINE (APRESOLINE) tablet 25 mg  25 mg Oral TID  isosorbide dinitrate (ISORDIL) tablet 10 mg  10 mg Oral TID  carvediloL (COREG) tablet 6.25 mg  6.25 mg Oral BID WITH MEALS  sodium chloride (NS) flush 5-40 mL  5-40 mL IntraVENous Q8H  
 sodium chloride (NS) flush 5-40 mL  5-40 mL IntraVENous PRN  
 acetaminophen (TYLENOL) tablet 650 mg  650 mg Oral Q4H PRN  
 enoxaparin (LOVENOX) injection 40 mg  40 mg SubCUTAneous DAILY  ondansetron (ZOFRAN) injection 4 mg  4 mg IntraVENous Q6H PRN  
 docusate sodium (COLACE) capsule 100 mg  100 mg Oral PRN Review of Systems: A comprehensive review of systems was negative except for that written in the HPI. Objective:  
Physical Exam:  
 
Visit Vitals BP (!) 165/98 Pulse 94 Temp 99.2 °F (37.3 °C) Resp 22 Ht 5' 10\" (1.778 m) Wt 186 lb 15.2 oz (84.8 kg) SpO2 98% BMI 26.82 kg/m² O2 Flow Rate (L/min): 3 l/min O2 Device: Room air Temp (24hrs), Av.1 °F (37.3 °C), Min:98.5 °F (36.9 °C), Max:99.6 °F (37.6 °C) 
  03/23 0701 - 03/23 1900 In: -  
Out: 250 [Urine:250]   03/21 1901 - 03/23 0700 In: 240 [P.O.:240] Out: 1375 [[id number]] General:  Alert, cooperative, no distress, appears stated age. Lungs:   Clear to auscultation bilaterally. Chest wall:  No tenderness or deformity. Heart:  Regular rate and rhythm, S1, S2 normal, no murmur, click, rub or gallop. Abdomen:   Soft, non-tender. Bowel sounds normal. No masses,  No organomegaly. Extremities: Extremities normal, atraumatic, no cyanosis or edema. Pulses: 2+ and symmetric all extremities. Skin: Skin color, texture, turgor normal. No rashes or lesions Neurologic: CNII-XII intact. No gross sensory or motor deficits Data Review:  
   
Recent Days: 
Recent Labs  
  03/22/21 
0915 03/21/21 
1155 03/20/21 
1257 WBC 8.7 9.4  --   
HGB 8.8* 8.4* 8.3* HCT 28.0* 27.3* 26.6*  
 359  --   
 
Recent Labs  
  03/22/21 
0915 03/21/21 
1155  140  
K 3.6 4.1  107 CO2 23 25 * 206* BUN 40* 38* CREA 2.32* 2.49* CA 9.3 9.2 PHOS 3.5 3.1 ALB 2.3* 2.3* No results for input(s): PH, PCO2, PO2, HCO3, FIO2 in the last 72 hours. 24 Hour Results: 
Recent Results (from the past 24 hour(s)) GLUCOSE, POC Collection Time: 03/22/21  7:47 PM  
Result Value Ref Range Glucose (POC) 311 (H) 65 - 100 mg/dL Performed by Simon Larsen Assessment/  
Acute on chronic heart failure HTN 
CKD stage 3 Diabetes Anemia of chronic disease Plan: 
Acute of Chronic heart failure- Continue carvedilol and continue cardiology follow up. Patient is on hydralazine and isordil. Patient previously had respiratory distress monitor O2. EF was 25% and pro BNP 17,104 HTN-BP is elevated consider hypertensive agent CKD stage 3-Repeat and monitor renal function tests. Diabetes- Monitor BG, give insulin in significant elevation Total time spent with patient: 30 minutes. Kathe Bermudez *ATTENTION:  This note has been created by a medical student for educational purposes only. Please do not refer to the content of this note for clinical decision-making, billing, or other purposes. Please see attending physicians note to obtain clinical information on this patient. *

## 2021-03-23 NOTE — PROGRESS NOTES
Hospitalist Progress Note               Daily Progress Note: 3/23/2021      Subjective: The patient is seen for follow up. Mr. Lester Park is a 62-year-old gentleman with history of chronic systolic heart failure prior EF of 25% admitted for worsening shortness of breath. Recent 2D echocardiogram showed EF of 25% with moderate left ventricular hypertrophy and dilated left atrium. Per nursing staff, he had 16 beats of wide-complex tachycardia overnight. He was asymptomatic      Problem List:  Problem List as of 3/23/2021 Never Reviewed          Codes Class Noted - Resolved    CAP (community acquired pneumonia) ICD-10-CM: J18.9  ICD-9-CM: 086  3/19/2021 - Present              Medications reviewed  Current Facility-Administered Medications   Medication Dose Route Frequency    magnesium oxide (MAG-OX) tablet 400 mg  400 mg Oral BID    carvediloL (COREG) tablet 12.5 mg  12.5 mg Oral BID WITH MEALS    levoFLOXacin (LEVAQUIN) tablet 500 mg  500 mg Oral Q24H    hydrALAZINE (APRESOLINE) tablet 25 mg  25 mg Oral TID    isosorbide dinitrate (ISORDIL) tablet 10 mg  10 mg Oral TID    sodium chloride (NS) flush 5-40 mL  5-40 mL IntraVENous Q8H    sodium chloride (NS) flush 5-40 mL  5-40 mL IntraVENous PRN    acetaminophen (TYLENOL) tablet 650 mg  650 mg Oral Q4H PRN    enoxaparin (LOVENOX) injection 40 mg  40 mg SubCUTAneous DAILY    ondansetron (ZOFRAN) injection 4 mg  4 mg IntraVENous Q6H PRN    docusate sodium (COLACE) capsule 100 mg  100 mg Oral PRN       Review of Systems:   A comprehensive review of systems was negative except for that written in the HPI.     Objective:   Physical Exam:     Visit Vitals  BP (!) 165/98   Pulse 94   Temp 99.2 °F (37.3 °C)   Resp 22   Ht 5' 10\" (1.778 m)   Wt 84.8 kg (186 lb 15.2 oz)   SpO2 98%   BMI 26.82 kg/m²    O2 Flow Rate (L/min): 3 l/min O2 Device: Room air    Temp (24hrs), Av.1 °F (37.3 °C), Min:98.5 °F (36.9 °C), Max:99.6 °F (37.6 °C)     0701 -  1900  In: -   Out: 250 [Urine:250]   03/21 1901 - 03/23 0700  In: 240 [P.O.:240]  Out: 2187 [Urine:1375]    General:   Awake and alert, comfortable   Lungs:    Clear lung fields bilateral   Chest wall:  No tenderness or deformity. Heart:  Regular rate and rhythm, S1, S2 normal, no murmur, click, rub or gallop. Abdomen:   Soft, non-tender. Bowel sounds normal. No masses,  No organomegaly. Extremities: Extremities normal, atraumatic, no cyanosis   1+ edema in right ankle. Left ankle edema resolved. Pulses: 2+ and symmetric all extremities. Skin: Skin color, texture, turgor normal. No rashes or lesions   Neurologic: CNII-XII intact. No gross focal deficits         Data Review:       Recent Days:  Recent Labs     03/22/21  0915 03/21/21  1155 03/20/21  1257   WBC 8.7 9.4  --    HGB 8.8* 8.4* 8.3*   HCT 28.0* 27.3* 26.6*    359  --      Recent Labs     03/22/21  0915 03/21/21  1155    140   K 3.6 4.1    107   CO2 23 25   * 206*   BUN 40* 38*   CREA 2.32* 2.49*   CA 9.3 9.2   PHOS 3.5 3.1   ALB 2.3* 2.3*     No results for input(s): PH, PCO2, PO2, HCO3, FIO2 in the last 72 hours. 24 Hour Results:  Recent Results (from the past 24 hour(s))   GLUCOSE, POC    Collection Time: 03/22/21  7:47 PM   Result Value Ref Range    Glucose (POC) 311 (H) 65 - 100 mg/dL    Performed by Jimmie Conn        XR CHEST PA LAT   Final Result      XR CHEST PA LAT   Final Result   FINDINGS/IMPRESSION:   Lungs similarly inflated when compared with the prior. Cardiac silhouette stable in size. Negative for pneumothorax. Persistent patchy bilateral airspace disease. This may represent multilobar   pneumonia or edema, or perhaps a combination of each. Senescent changes are present in the thoracic spine. XR CHEST PORT   Final Result   FINDINGS/IMPRESSION:   Lungs similarly inflated. Cardiac silhouette stable in size. No radiographically   apparent pneumothorax.       Persistent but improvement of diffuse airspace opacity. This may represent   multilobar pneumonia or edema, or perhaps a combination of each. Assessment:  Acute on chronic systolic heart failure, EF 25%. Improving, patient does not appear volume overloaded currently    Possible component of bilateral community-acquired pneumonia    Acute respiratory failure with hypoxia, improving     Accelerated hypertension     Sepsis with tachycardia, leukocytosis and tachypnea     Lactic acidosis due to hypoxia     Diabetes mellitus type 2     Chronic kidney disease stage III     Anemia of chronic kidney disease    Nonsustained ventricular tachycardia      Plan:  Continue supportive care including oral Levaquin  Add magnesium oxide 400 mg oral twice daily  Monitor renal functions closely  PT OT evaluation  Increase carvedilol to 12.5 mg twice daily  Wean oxygen  Possible discharge in 24 hours      Care Plan discussed with: Patient/Family     Total time spent with patient: 30 minutes.     Lety Laurent MD

## 2021-03-24 LAB
ANION GAP SERPL CALC-SCNC: 10 MMOL/L (ref 5–15)
BACTERIA SPEC CULT: NORMAL
BACTERIA SPEC CULT: NORMAL
BASOPHILS # BLD: 0 K/UL (ref 0–0.1)
BASOPHILS NFR BLD: 0 % (ref 0–1)
BUN SERPL-MCNC: 52 MG/DL (ref 6–20)
BUN/CREAT SERPL: 25 (ref 12–20)
CA-I BLD-MCNC: 9.9 MG/DL (ref 8.5–10.1)
CHLORIDE SERPL-SCNC: 105 MMOL/L (ref 97–108)
CO2 SERPL-SCNC: 23 MMOL/L (ref 21–32)
CREAT SERPL-MCNC: 2.1 MG/DL (ref 0.7–1.3)
DIFFERENTIAL METHOD BLD: ABNORMAL
EOSINOPHIL # BLD: 0.5 K/UL (ref 0–0.4)
EOSINOPHIL NFR BLD: 7 % (ref 0–7)
ERYTHROCYTE [DISTWIDTH] IN BLOOD BY AUTOMATED COUNT: 16.8 % (ref 11.5–14.5)
GLUCOSE SERPL-MCNC: 202 MG/DL (ref 65–100)
HCT VFR BLD AUTO: 27.9 % (ref 36.6–50.3)
HGB BLD-MCNC: 9.1 G/DL (ref 12.1–17)
IMM GRANULOCYTES # BLD AUTO: 0.2 K/UL (ref 0–0.04)
IMM GRANULOCYTES NFR BLD AUTO: 2 % (ref 0–0.5)
LYMPHOCYTES # BLD: 1.5 K/UL (ref 0.8–3.5)
LYMPHOCYTES NFR BLD: 19 % (ref 12–49)
MCH RBC QN AUTO: 23.9 PG (ref 26–34)
MCHC RBC AUTO-ENTMCNC: 32.6 G/DL (ref 30–36.5)
MCV RBC AUTO: 73.2 FL (ref 80–99)
MONOCYTES # BLD: 1.1 K/UL (ref 0–1)
MONOCYTES NFR BLD: 14 % (ref 5–13)
NEUTS SEG # BLD: 5 K/UL (ref 1.8–8)
NEUTS SEG NFR BLD: 61 % (ref 32–75)
PLATELET # BLD AUTO: 469 K/UL (ref 150–400)
POTASSIUM SERPL-SCNC: 3.7 MMOL/L (ref 3.5–5.1)
RBC # BLD AUTO: 3.81 M/UL (ref 4.1–5.7)
SODIUM SERPL-SCNC: 138 MMOL/L (ref 136–145)
SPECIAL REQUESTS,SREQ: NORMAL
SPECIAL REQUESTS,SREQ: NORMAL
WBC # BLD AUTO: 8.3 K/UL (ref 4.1–11.1)

## 2021-03-24 PROCEDURE — 74011250636 HC RX REV CODE- 250/636: Performed by: INTERNAL MEDICINE

## 2021-03-24 PROCEDURE — 74011250637 HC RX REV CODE- 250/637: Performed by: INTERNAL MEDICINE

## 2021-03-24 PROCEDURE — 65270000029 HC RM PRIVATE

## 2021-03-24 PROCEDURE — 97161 PT EVAL LOW COMPLEX 20 MIN: CPT

## 2021-03-24 PROCEDURE — 36415 COLL VENOUS BLD VENIPUNCTURE: CPT

## 2021-03-24 PROCEDURE — 94762 N-INVAS EAR/PLS OXIMTRY CONT: CPT

## 2021-03-24 PROCEDURE — 85025 COMPLETE CBC W/AUTO DIFF WBC: CPT

## 2021-03-24 PROCEDURE — 80048 BASIC METABOLIC PNL TOTAL CA: CPT

## 2021-03-24 PROCEDURE — 97116 GAIT TRAINING THERAPY: CPT

## 2021-03-24 RX ORDER — HYDRALAZINE HYDROCHLORIDE 50 MG/1
50 TABLET, FILM COATED ORAL 3 TIMES DAILY
Qty: 90 TAB | Refills: 1 | Status: SHIPPED | OUTPATIENT
Start: 2021-03-24 | End: 2021-04-23

## 2021-03-24 RX ORDER — AMIODARONE HYDROCHLORIDE 200 MG/1
200 TABLET ORAL DAILY
Qty: 30 TAB | Refills: 0 | Status: SHIPPED | OUTPATIENT
Start: 2021-03-25 | End: 2021-04-24

## 2021-03-24 RX ORDER — FUROSEMIDE 40 MG/1
60 TABLET ORAL DAILY
Qty: 90 TAB | Refills: 1 | Status: SHIPPED | OUTPATIENT
Start: 2021-03-24 | End: 2021-04-23

## 2021-03-24 RX ORDER — POTASSIUM CHLORIDE 1500 MG/1
20 TABLET, FILM COATED, EXTENDED RELEASE ORAL DAILY
Qty: 30 TAB | Refills: 0 | Status: ON HOLD | OUTPATIENT
Start: 2021-03-24 | End: 2021-04-09

## 2021-03-24 RX ORDER — DOXYCYCLINE 100 MG/1
100 CAPSULE ORAL 2 TIMES DAILY
Qty: 14 CAP | Refills: 0 | Status: SHIPPED | OUTPATIENT
Start: 2021-03-24 | End: 2021-03-31

## 2021-03-24 RX ORDER — ISOSORBIDE DINITRATE 10 MG/1
10 TABLET ORAL 3 TIMES DAILY
Qty: 90 TAB | Refills: 1 | Status: SHIPPED | OUTPATIENT
Start: 2021-03-24 | End: 2021-04-23

## 2021-03-24 RX ORDER — AMIODARONE HYDROCHLORIDE 200 MG/1
200 TABLET ORAL DAILY
Status: DISCONTINUED | OUTPATIENT
Start: 2021-03-25 | End: 2021-03-25 | Stop reason: HOSPADM

## 2021-03-24 RX ORDER — CLONIDINE HYDROCHLORIDE 0.1 MG/1
0.1 TABLET ORAL 2 TIMES DAILY
Status: DISCONTINUED | OUTPATIENT
Start: 2021-03-24 | End: 2021-03-25 | Stop reason: HOSPADM

## 2021-03-24 RX ORDER — HYDRALAZINE HYDROCHLORIDE 50 MG/1
50 TABLET, FILM COATED ORAL 3 TIMES DAILY
Status: DISCONTINUED | OUTPATIENT
Start: 2021-03-24 | End: 2021-03-25 | Stop reason: HOSPADM

## 2021-03-24 RX ORDER — LANOLIN ALCOHOL/MO/W.PET/CERES
400 CREAM (GRAM) TOPICAL 2 TIMES DAILY
Qty: 28 TAB | Refills: 0 | Status: SHIPPED | OUTPATIENT
Start: 2021-03-24 | End: 2021-04-07

## 2021-03-24 RX ORDER — CARVEDILOL 12.5 MG/1
12.5 TABLET ORAL 2 TIMES DAILY WITH MEALS
Qty: 60 TAB | Refills: 1 | Status: SHIPPED | OUTPATIENT
Start: 2021-03-24 | End: 2021-04-23

## 2021-03-24 RX ADMIN — HYDRALAZINE HYDROCHLORIDE 50 MG: 50 TABLET, FILM COATED ORAL at 08:24

## 2021-03-24 RX ADMIN — ISOSORBIDE DINITRATE 10 MG: 10 TABLET ORAL at 20:31

## 2021-03-24 RX ADMIN — HYDRALAZINE HYDROCHLORIDE 50 MG: 50 TABLET, FILM COATED ORAL at 20:31

## 2021-03-24 RX ADMIN — CARVEDILOL 12.5 MG: 12.5 TABLET, FILM COATED ORAL at 17:00

## 2021-03-24 RX ADMIN — FUROSEMIDE 60 MG: 40 TABLET ORAL at 08:24

## 2021-03-24 RX ADMIN — Medication 10 ML: at 05:02

## 2021-03-24 RX ADMIN — CLONIDINE HYDROCHLORIDE 0.1 MG: 0.1 TABLET ORAL at 20:31

## 2021-03-24 RX ADMIN — ENOXAPARIN SODIUM 40 MG: 100 INJECTION SUBCUTANEOUS at 08:20

## 2021-03-24 RX ADMIN — CLONIDINE HYDROCHLORIDE 0.1 MG: 0.1 TABLET ORAL at 11:15

## 2021-03-24 RX ADMIN — Medication 400 MG: at 08:34

## 2021-03-24 RX ADMIN — Medication 10 ML: at 20:31

## 2021-03-24 RX ADMIN — HYDRALAZINE HYDROCHLORIDE 50 MG: 50 TABLET, FILM COATED ORAL at 16:11

## 2021-03-24 RX ADMIN — ISOSORBIDE DINITRATE 10 MG: 10 TABLET ORAL at 16:00

## 2021-03-24 RX ADMIN — ISOSORBIDE DINITRATE 10 MG: 10 TABLET ORAL at 08:20

## 2021-03-24 RX ADMIN — Medication 10 ML: at 13:54

## 2021-03-24 RX ADMIN — CARVEDILOL 12.5 MG: 12.5 TABLET, FILM COATED ORAL at 08:24

## 2021-03-24 RX ADMIN — Medication 400 MG: at 20:31

## 2021-03-24 NOTE — MED STUDENT NOTES
Hospitalist Progress Note Martina Ferrer Daily Progress Note: 3/24/2021 Subjective: The patient is seen for follow up. James Gould is a 80year old male with a PMH of CHF, HTN, CKD, Diabetes who initially presented with shortness of breath. Patient is being followed by pulmonology and  was treated with lasix and bipap. Patient is now out of the ICU. Patient was awake and alert. Patient is 98% on room air. Patient some elevation of BUN today at 52. Patient is doing well. Patient denies headache, syncope or chest pain. Problem List: 
Problem List as of 3/24/2021 Never Reviewed Codes Class Noted - Resolved CAP (community acquired pneumonia) ICD-10-CM: J18.9 ICD-9-CM: 903  3/19/2021 - Present Medications reviewed Current Facility-Administered Medications Medication Dose Route Frequency  hydrALAZINE (APRESOLINE) tablet 50 mg  50 mg Oral TID  magnesium oxide (MAG-OX) tablet 400 mg  400 mg Oral BID  carvediloL (COREG) tablet 12.5 mg  12.5 mg Oral BID WITH MEALS  furosemide (LASIX) tablet 60 mg  60 mg Oral DAILY  levoFLOXacin (LEVAQUIN) tablet 500 mg  500 mg Oral Q24H  
 isosorbide dinitrate (ISORDIL) tablet 10 mg  10 mg Oral TID  sodium chloride (NS) flush 5-40 mL  5-40 mL IntraVENous Q8H  
 sodium chloride (NS) flush 5-40 mL  5-40 mL IntraVENous PRN  
 acetaminophen (TYLENOL) tablet 650 mg  650 mg Oral Q4H PRN  
 enoxaparin (LOVENOX) injection 40 mg  40 mg SubCUTAneous DAILY  ondansetron (ZOFRAN) injection 4 mg  4 mg IntraVENous Q6H PRN  
 docusate sodium (COLACE) capsule 100 mg  100 mg Oral PRN Review of Systems: A comprehensive review of systems was negative except for that written in the HPI. Objective:  
Physical Exam:  
 
Visit Vitals BP (!) 174/101 Pulse 95 Temp 99 °F (37.2 °C) Resp 20 Ht 5' 10\" (1.778 m) Wt 186 lb 15.2 oz (84.8 kg) SpO2 98% BMI 26.82 kg/m²  O2 Flow Rate (L/min): 3 l/min O2 Device: Room air Temp (24hrs), Av.8 °F (37.1 °C), Min:98.5 °F (36.9 °C), Max:99.1 °F (37.3 °C) No intake/output data recorded.  1901 -  0700 In: -  
Out: 980 [JEUNN:696] General:  Alert, cooperative, no distress, appears stated age. Lungs:   Clear to auscultation bilaterally. Chest wall:  No tenderness or deformity. Heart:  Regular rate and rhythm, S1, S2 normal, no murmur, click, rub or gallop. Abdomen:   Soft, non-tender. Bowel sounds normal. No masses,  No organomegaly. Extremities: Extremities normal, atraumatic, no cyanosis or edema. Pulses: 2+ and symmetric all extremities. Skin: Skin color, texture, turgor normal. No rashes or lesions Neurologic: CNII-XII intact. No gross sensory or motor deficits Data Review:  
   
Recent Days: 
Recent Labs  
  21 
0425 21 
0915 21 
1155 WBC 8.3 8.7 9.4 HGB 9.1* 8.8* 8.4* HCT 27.9* 28.0* 27.3*  
* 345 359 Recent Labs  
  21 
0425 21 
1046 21 
0915 21 
1155   --  138 140  
K 3.7  --  3.6 4.1   --  106 107 CO2 23  --  23 25 *  --  204* 206* BUN 52*  --  40* 38* CREA 2.10*  --  2.32* 2.49* CA 9.9  --  9.3 9.2 MG  --  2.1  --   --   
PHOS  --   --  3.5 3.1 ALB  --   --  2.3* 2.3* No results for input(s): PH, PCO2, PO2, HCO3, FIO2 in the last 72 hours. 24 Hour Results: 
Recent Results (from the past 24 hour(s)) MAGNESIUM Collection Time: 21 10:46 AM  
Result Value Ref Range Magnesium 2.1 1.6 - 2.4 mg/dL GLUCOSE, POC Collection Time: 21 11:51 AM  
Result Value Ref Range Glucose (POC) 247 (H) 65 - 100 mg/dL Performed by KAMILLE GRAY   
GLUCOSE, POC Collection Time: 21  4:02 PM  
Result Value Ref Range Glucose (POC) 214 (H) 65 - 100 mg/dL Performed by Coty Hanna   
CBC WITH AUTOMATED DIFF Collection Time: 21  4:25 AM  
Result Value Ref Range  WBC 8.3 4.1 - 11.1 K/uL  
 RBC 3.81 (L) 4.10 - 5.70 M/uL HGB 9.1 (L) 12.1 - 17.0 g/dL HCT 27.9 (L) 36.6 - 50.3 % MCV 73.2 (L) 80.0 - 99.0 FL  
 MCH 23.9 (L) 26.0 - 34.0 PG  
 MCHC 32.6 30.0 - 36.5 g/dL  
 RDW 16.8 (H) 11.5 - 14.5 % PLATELET 566 (H) 828 - 400 K/uL NEUTROPHILS 61 32 - 75 % LYMPHOCYTES 19 12 - 49 % MONOCYTES 14 (H) 5 - 13 % EOSINOPHILS 7 0 - 7 % BASOPHILS 0 0 - 1 % IMMATURE GRANULOCYTES 2 (H) 0.0 - 0.5 % ABS. NEUTROPHILS 5.0 1.8 - 8.0 K/UL  
 ABS. LYMPHOCYTES 1.5 0.8 - 3.5 K/UL  
 ABS. MONOCYTES 1.1 (H) 0.0 - 1.0 K/UL  
 ABS. EOSINOPHILS 0.5 (H) 0.0 - 0.4 K/UL  
 ABS. BASOPHILS 0.0 0.0 - 0.1 K/UL  
 ABS. IMM. GRANS. 0.2 (H) 0.00 - 0.04 K/UL  
 DF AUTOMATED METABOLIC PANEL, BASIC Collection Time: 03/24/21  4:25 AM  
Result Value Ref Range Sodium 138 136 - 145 mmol/L Potassium 3.7 3.5 - 5.1 mmol/L Chloride 105 97 - 108 mmol/L  
 CO2 23 21 - 32 mmol/L Anion gap 10 5 - 15 mmol/L Glucose 202 (H) 65 - 100 mg/dL BUN 52 (H) 6 - 20 mg/dL Creatinine 2.10 (H) 0.70 - 1.30 mg/dL BUN/Creatinine ratio 25 (H) 12 - 20 GFR est AA 38 (L) >60 ml/min/1.73m2 GFR est non-AA 31 (L) >60 ml/min/1.73m2 Calcium 9.9 8.5 - 10.1 mg/dL Assessment/  
Acute on chronic heart failure HTN 
CKD stage 3 Diabetes Anemia of chronic disease Plan: 
Acute of Chronic heart failure- Continue carvedilol and continue cardiology follow up. Patient is on hydralazine and isordil. Patient previously had respiratory distress monitor O2. EF was 25% and pro BNP 17,104 HTN-BP is elevated consider hypertensive agent, patient is on carvedilol CKD stage 3-Repeat and monitor renal function tests. Diabetes- Monitor BG, give insulin in significant elevation Total time spent with patient: 30 minutes. Yani Silver *ATTENTION:  This note has been created by a medical student for educational purposes only.   Please do not refer to the content of this note for clinical decision-making, billing, or other purposes. Please see attending physicians note to obtain clinical information on this patient. *

## 2021-03-24 NOTE — PROGRESS NOTES
Problem: Falls - Risk of  Goal: *Absence of Falls  Description: Document Melvin Smallwood Fall Risk and appropriate interventions in the flowsheet.   Outcome: Progressing Towards Goal  Note: Fall Risk Interventions:  Mobility Interventions: Patient to call before getting OOB         Medication Interventions: Patient to call before getting OOB, Teach patient to arise slowly    Elimination Interventions: Call light in reach

## 2021-03-24 NOTE — PROGRESS NOTES
Patient has been accepted by [facility]. CM sent all required paperwork [facility]. Patient has a copay for the walker that has been ordered of $16.40, Patient notified of this and he will  at discharge from [facility].  Discharge plan of care/case management plan validated with provider discharge order. We are awaiting Life Vest before patient can be discharged.

## 2021-03-24 NOTE — ROUTINE PROCESS
LOS skin assessment performed with Cem Ahuja RN. Other than noted skin tag on right flank, skin is warm, dry, and intact.

## 2021-03-24 NOTE — PROGRESS NOTES
Patient has been accepted with [facility] in [facility] when medically stable for discharge.   Still awaiting Rolling Walker order to be processed by [facility].

## 2021-03-24 NOTE — PROGRESS NOTES
Progress Note      3/24/2021 3:18 PM  NAME: Christina Morton   MRN:  [id number]   Admit Diagnosis: CAP (community acquired pneumonia) [J18.9]      Problem List:   Acute CHF decompensation   Systolic HF with EF 67%  Dilated cardiomyopathy, unclear whether the patient has underlying CAD  Non-sustained WCT ? VT  Chronic kidney disease  Essential hypertension     Assessment/Plan:   LifeVest due to nonsustained wide-complex tachycardia, suspected V. Tach  Dilated cardiomyopathy on echo; will need cardiac catheterization to define his coronary anatomy  Outpatient vs inpatient cath based on renal function tomorrow         [x]       High complexity decision making was performed in this patient at high risk for decompensation with multiple organ involvement. Subjective:     Christina Morton denies chest pain, dyspnea is better. Discussed with his wife and daughter. Review of Systems:   Negative except for as noted above. Objective:      Physical Exam:    Last 24hrs VS reviewed since prior progress note. Most recent are:    Visit Vitals  BP (!) 174/101   Pulse 95   Temp 99 °F (37.2 °C)   Resp 20   Ht 5' 10\" (1.778 m)   Wt 84.8 kg (186 lb 15.2 oz)   SpO2 98%   BMI 26.82 kg/m²       Intake/Output Summary (Last 24 hours) at 3/24/2021 1106  Last data filed at 3/23/2021 1623  Gross per 24 hour   Intake    Output 140 ml   Net -140 ml        General Appearance: Alert; no acute distress. Ears/Nose/Mouth/Throat: Hearing grossly normal; moist mucous membranes  Neck: Supple. Chest: Bilateral coarse rhonchi. Cardiovascular: Regular rate and rhythm, S1S2 normal, soft systolic murmur  Abdomen: Soft, non-tender, bowel sounds are active. Extremities: No edema bilaterally. Skin: Warm and dry. []         Post-cath site without hematoma, bruit, tenderness, or thrill. Distal pulses intact.     PMH/SH reviewed - no change compared to H&P        Telemetry: Sinus rhythm      []  No new EKG for review    Lab Data Personally Reviewed:    Recent Labs     03/24/21  0425 03/22/21  0915   WBC 8.3 8.7   HGB 9.1* 8.8*   HCT 27.9* 28.0*   * 345     No results for input(s): INR, PTP, APTT, INREXT, INREXT in the last 72 hours. Recent Labs     03/24/21  0425 03/23/21  1046 03/22/21  0915 03/21/21  1155     --  138 140   K 3.7  --  3.6 4.1     --  106 107   CO2 23  --  23 25   BUN 52*  --  40* 38*   CREA 2.10*  --  2.32* 2.49*   *  --  204* 206*   CA 9.9  --  9.3 9.2   MG  --  2.1  --   --      No results for input(s): CPK, CKNDX, TROIQ in the last 72 hours. No lab exists for component: CPKMB  No results found for: CHOL, CHOLX, CHLST, CHOLV, HDL, HDLP, LDL, LDLC, DLDLP, TGLX, TRIGL, TRIGP, CHHD, CHHDX    Recent Labs     03/22/21  0915 03/21/21  1155   ALB 2.3* 2.3*     No results for input(s): PH, PCO2, PO2 in the last 72 hours.     Medications Personally Reviewed:    Current Facility-Administered Medications   Medication Dose Route Frequency    hydrALAZINE (APRESOLINE) tablet 50 mg  50 mg Oral TID    cloNIDine HCL (CATAPRES) tablet 0.1 mg  0.1 mg Oral BID    magnesium oxide (MAG-OX) tablet 400 mg  400 mg Oral BID    carvediloL (COREG) tablet 12.5 mg  12.5 mg Oral BID WITH MEALS    furosemide (LASIX) tablet 60 mg  60 mg Oral DAILY    levoFLOXacin (LEVAQUIN) tablet 500 mg  500 mg Oral Q24H    isosorbide dinitrate (ISORDIL) tablet 10 mg  10 mg Oral TID    sodium chloride (NS) flush 5-40 mL  5-40 mL IntraVENous Q8H    sodium chloride (NS) flush 5-40 mL  5-40 mL IntraVENous PRN    acetaminophen (TYLENOL) tablet 650 mg  650 mg Oral Q4H PRN    enoxaparin (LOVENOX) injection 40 mg  40 mg SubCUTAneous DAILY    ondansetron (ZOFRAN) injection 4 mg  4 mg IntraVENous Q6H PRN    docusate sodium (COLACE) capsule 100 mg  100 mg Oral PRN         Nikhil Gutierrez MD

## 2021-03-24 NOTE — PROGRESS NOTES
Recommendations are for [place] and rolling walker for patient. CM met with patient at bedside. No preference in [address] at this time. CM sent referrals, pending acceptance. Rolling walker order and sent to [facility] for processing.

## 2021-03-24 NOTE — PROGRESS NOTES
Consult  Pulmonary, Critical Care    Name: Alicia Ann MRN: [id number]   : 1950 Hospital: [facility]   Date: 3/24/2021  Admission date: 3/19/2021 Hospital Day: 6       Subjective/Interval History:   Seen in the emergency room on nasal high flow oxygen at 80% with oxygen saturation 93% mildly labored respirations. He states that over the last 2 weeks he has been having worsening swelling of his ankles and worsening shortness of breath. Denies fever chills sweats has had some cough nonproductive. He went to the emergency room found to have severe diffuse pulmonary infiltrates was given 1 dose of Lasix placed on BiPAP and transferred to our facility. Of interest he states that 10 or 15 years ago he was hospitalized at [facility] with congestive heart failure had cardiac catheterization with no blockages and was told that medication would control his problem  3/20 feels much better respirations nonlabored have been able to decrease nasal high flow to 40%  3/21 respirations nonlabored on nasal oxygen 6 liters  3/24 doing well with no respiratory distress. Currently on room air with oxygen saturation 97%  Hospital Problems  Never Reviewed          Codes Class Noted POA    CAP (community acquired pneumonia) ICD-10-CM: J18.9  ICD-9-CM: 847  3/19/2021 Unknown              IMPRESSION:   1. Acute hypoxic respiratory failure improved now on room air  2. Pulmonary edema radiographically resolved on last chest x-ray  3. Diffuse pulmonary infiltrates probable all CHF almost complete clearing on 3/23  x-ray  4. Anemia  stable  5. Hypertension better control  6. Acute kidney injury creatinine had risen 3/21 but is improved 3/24  7. Edema  8. Cardiomyopathy ejection fraction 25%  9. Diastolic dysfunction  Body mass index is 26.82 kg/m². 10.       RECOMMENDATIONS/PLAN:   1. Continue Lasix and continue to follow creatinine no labs available today    2.  Now on room air if remains hospitalized tonight we will do overnight oximetry on room air          [x] High complexity decision making was performed  [x] See my orders for details      Subjective/Initial History:     I was asked by Nathan Zaragoza MD to see Romario Lopez  a 80 y.o.  male in consultation for a chief complaint of acute hypoxic respiratory failure pulmonary edema    E  No Known Allergies     MAR reviewed and pertinent medications noted or modified as needed     Current Facility-Administered Medications   Medication    hydrALAZINE (APRESOLINE) tablet 50 mg    cloNIDine HCL (CATAPRES) tablet 0.1 mg    [START ON 3/25/2021] amiodarone (CORDARONE) tablet 200 mg    magnesium oxide (MAG-OX) tablet 400 mg    carvediloL (COREG) tablet 12.5 mg    furosemide (LASIX) tablet 60 mg    isosorbide dinitrate (ISORDIL) tablet 10 mg    sodium chloride (NS) flush 5-40 mL    sodium chloride (NS) flush 5-40 mL    acetaminophen (TYLENOL) tablet 650 mg    enoxaparin (LOVENOX) injection 40 mg    ondansetron (ZOFRAN) injection 4 mg    docusate sodium (COLACE) capsule 100 mg      Patient PCP: Jaden, MD Jayden  PMH:  has a past medical history of CKD (chronic kidney disease) stage 3, GFR 30-59 ml/min, Diabetes ([address] Heart failure ([address] and Hypertension. PSH:   has no past surgical history on file. FHX: family history is not on file. SHX:  reports that he has never smoked. He has never used smokeless tobacco. He reports previous alcohol use.      Systemic review  General states his weight stable has not noted chronic fever chills or sweats at home  Eyes no double vision or momentary blindness  ENT no sinus congestion drainage or facial pain  Endocrinologic no polyuria polydipsia  Musculoskeletal no swollen tender joints  Neurologic no seizures or syncope  Gastrointestinal no nausea vomiting acid indigestion sour taste  Genitourinary no pain or discomfort on urination no bleeding  Cardiovascular has worsening edema over the last 2 weeks with shortness of breath and nonproductive cough. Gives a history of having been hospitalized at [facility] 10 or 15 years ago with heart congestion and was started on medications  Respiratory increased shortness of breath nonproductive cough no fever chills or sweats    Objective:     Vital Signs: Telemetry:    normal sinus rhythm Intake/Output:   Visit Vitals  BP (!) 140/92   Pulse 85   Temp 98 °F (36.7 °C)   Resp 20   Ht 5' 10\" (1.778 m)   Wt 84.8 kg (186 lb 15.2 oz)   SpO2 97%   BMI 26.82 kg/m²       Temp (24hrs), Av.7 °F (37.1 °C), Min:98 °F (36.7 °C), Max:99.1 °F (37.3 °C)        O2 Device: Room air O2 Flow Rate (L/min): 3 l/min       Wt Readings from Last 4 Encounters:   21 84.8 kg (186 lb 15.2 oz)          Intake/Output Summary (Last 24 hours) at 3/24/2021 1202  Last data filed at 3/23/2021 1623  Gross per 24 hour   Intake    Output 140 ml   Net -140 ml       Last shift:      No intake/output data recorded. Last 3 shifts:  1901 -  0700  In: -   Out: 390 [Urine:390]       Physical Exam:   General:  male; no distress laying in bed today on room air  HEENT: NCAT, oral mucosa clear  Eyes: anicteric; conjunctiva clear extraocular movements intact  Neck: no nodes, neck veins look flat  Chest: no deformity,   Cardiac: Distant heart sounds seem regular no definite murmur no ankle edema today still has sacral edema  Lungs: Clear anteriorly laterally and upper lungs posteriorly with just a few rales in the bases  Abd: Soft positive bowel sounds no tenderness  Ext: No ankle edema still has sacral edema; no joint swelling;  No clubbing  : clear urine  Neuro: Awake alert oriented moves all 4 extremities  Psych- no agitation, oriented to person;   Skin: warm, dry, no cyanosis;  Pulses: Radial radial femoral pulses intact  Capillary: Normal capillary refill    Labs:    Recent Labs     21  0425 21  0915   WBC 8.3 8.7   HGB 9.1* 8.8*   * 345     Recent Labs 03/24/21  0425 03/23/21  1046 03/22/21  0915     --  138   K 3.7  --  3.6     --  106   CO2 23  --  23   *  --  204*   BUN 52*  --  40*   CREA 2.10*  --  2.32*   CA 9.9  --  9.3   MG  --  2.1  --    PHOS  --   --  3.5   ALB  --   --  2.3*     Room air oxygen saturation 98%  BNP 17,100 10,641  COVID-19 antigen negative  Echocardiogram with ejection fraction 39% diastolic dysfunction present  Imaging:    CXR Results  (Last 48 hours)  3/23 chest x-ray Marked clearing of infiltrates minimal remaining  3/20 chest x-ray diffuse bilateral infiltrates persist but are markedly improved               03/19/21 1059  XR CHEST PORT Final result    Narrative:  Chest single view. Comparison single view chest May 1, 2020. Dense airspace opacity throughout the mid and lower lungs, new finding compared   to prior imaging. Suspect severe bilateral pneumonia. Cardiac silhouette   enlargement. No pneumothorax or sizable pleural effusion. To me it looks like severe pulmonary edema           Results from Hospital Encounter encounter on 03/19/21   XR CHEST PA LAT    Narrative Chest 2 views. Comparison single view chest March 21, 2021    Airspace opacity throughout each lung is less dense compared to prior imaging. Degree improved aeration compared to prior imaging. Cardiac and mediastinal  structures unchanged. No pneumothorax or sizable pleural effusion. XR CHEST PA LAT    Narrative XR CHEST PA LAT    Comparison: Chest radiograph dated March 20, 2021 at 0821 hours      Impression FINDINGS/IMPRESSION:  Lungs similarly inflated when compared with the prior. Cardiac silhouette stable in size. Negative for pneumothorax. Persistent patchy bilateral airspace disease. This may represent multilobar  pneumonia or edema, or perhaps a combination of each. Senescent changes are present in the thoracic spine.    XR CHEST PORT    Narrative XR CHEST PORT    Comparison: Chest radiograph dated March 19, 2021      Impression FINDINGS/IMPRESSION:  Lungs similarly inflated. Cardiac silhouette stable in size. No radiographically  apparent pneumothorax. Persistent but improvement of diffuse airspace opacity. This may represent  multilobar pneumonia or edema, or perhaps a combination of each. He gives a history of heart disease in the past with what sounds like congestive heart failure and now has diffuse severe pulmonary infiltrates and hypoxia with no fever chills or sweats sounds more like pulmonary edema I will give extra dose Lasix. He does however have leukocytosis of a significant amount so agree with your empiric antibiotic for the time being. Echocardiogram has been ordered  3/20 looks more comfortable today nonlabored sitting at rest.  Have decreased nasal oxygen to 40%. Chest x-ray is markedly improved. Creatinine is slightly improved even with diuresis. 3/21 denies shortness of breath today states his appetite is poor but sense of smell and taste are normal.  Creatinine has risen today. Repeat hemoglobin yesterday and this morning are stable  3/22 looks comfortable in bed oxygen has been decreased to 2 L. Still has a few rales present. Yesterday's creatinine was elevated today is pending. We will give Lasix today repeat chest x-ray tomorrow and labs  3/23 labs pending today he is comfortable today on room air. Chest x-ray markedly improved. 3/21 creatinine had elevated 3/22 had improved. BNP remains elevated 17,000. Lasix was discontinued will resume it orally  3/24 Lasix resumed yesterday creatinine is stable today improved from 3/21.   Respirations are nonlabored    Justo Johnson MD

## 2021-03-24 NOTE — DISCHARGE SUMMARY
Physician Discharge Summary     Patient ID:    Shakira Maldonado  [id number]  64 y.o.  1950    Admit date: 3/19/2021    Discharge date : 3/24/2021    Chronic Diagnoses:    Problem List as of 3/24/2021 Never Reviewed          Codes Class Noted - Resolved    CAP (community acquired pneumonia) ICD-10-CM: J18.9  ICD-9-CM: 486  3/19/2021 - Present          22    Final Diagnoses:   CAP (community acquired pneumonia) [J18.9]  Acute on chronic systolic heart failure, EF 25%. Improving, patient does not appear volume overloaded currently     Possible component of bilateral community-acquired pneumonia     Acute respiratory failure with hypoxia, improving     Accelerated hypertension     Sepsis with tachycardia, leukocytosis and tachypnea     Lactic acidosis due to hypoxia     Diabetes mellitus type 2     Chronic kidney disease stage III     Anemia of chronic kidney disease     Nonsustained ventricular tachycardia  Reason for Hospitalization:  Shortness of breath      Hospital Course:   Per H and P,\"80year-old male with a history of hypertension and unspecified heart failure who was transferred from [place] [address] where he presented with a 2 to 3-day history of worsening shortness of breath and nonproductive cough. He denies fever. He does note increased lower extremity edema. No chest pain. EMS found him to be hypoxic with saturations in the 70s. On a nonrebreather his oxygenation improved. On arrival he was started on BiPAP and given IV furosemide after which she was able to be weaned to high flow oxygen. Chest x-ray showed bilateral infiltrates. White count was 17,000\". He was admitted to medical telemetry floor and treated with IV diuretics and IV antibiotics. Over the course of his hospitalization, symptoms have improved tremendously. Repeat 2D echocardiogram showed an EF of 25%.   ACE inhibitors could not be started due to acute on chronic kidney disease however hydralazine and isosorbide mononitrate have been added to medication regimen. Patient advised close follow-up with cardiologist for possible outpatient cardiac catheterization. He has been advised on low-salt diet and compliance with home medication regimen. Due to runs of [facility], patient will go home with life vest and follow up out patient with Dr Bryce Alvarez. Amiodarone 200 mg daily added to medication regimen. Discharge Medications:   Current Discharge Medication List      START taking these medications    Details   carvediloL (COREG) 12.5 mg tablet Take 1 Tab by mouth two (2) times daily (with meals) for 30 days. Qty: 60 Tab, Refills: 1      furosemide (LASIX) 40 mg tablet Take 1.5 Tabs by mouth daily for 30 days. Qty: 90 Tab, Refills: 1      hydrALAZINE (APRESOLINE) 50 mg tablet Take 1 Tab by mouth three (3) times daily for 30 days. Qty: 90 Tab, Refills: 1      isosorbide dinitrate (ISORDIL) 10 mg tablet Take 1 Tab by mouth three (3) times daily for 30 days. Qty: 90 Tab, Refills: 1      magnesium oxide (MAG-OX) 400 mg tablet Take 1 Tab by mouth two (2) times a day for 14 days. Qty: 28 Tab, Refills: 0      doxycycline (MONODOX) 100 mg capsule Take 1 Cap by mouth two (2) times a day for 7 days. Qty: 14 Cap, Refills: 0      potassium chloride SR (K-TAB) 20 mEq tablet Take 1 Tab by mouth daily for 30 days. Qty: 30 Tab, Refills: 0         CONTINUE these medications which have NOT CHANGED    Details   cloNIDine HCL (CATAPRES) 0.1 mg tablet Take  by mouth two (2) times a day. allopurinoL (ZYLOPRIM) 300 mg tablet Take  by mouth daily. glipiZIDE SR (GLUCOTROL XL) 10 mg CR tablet Take 20 mg by mouth daily. STOP taking these medications       labetaloL (NORMODYNE) 300 mg tablet Comments:   Reason for Stopping:         amLODIPine (NORVASC) 10 mg tablet Comments:   Reason for Stopping:         lisinopriL (PRINIVIL, ZESTRIL) 20 mg tablet Comments:   Reason for Stopping:                  Follow up Care: 1. Jayden Stanley MD in 1-2 weeks. Please call to set up an appointment shortly after discharge. Diet:  Cardiac Diet    Disposition:  Home. Advanced Directive:   FULL    DNR      Discharge Exam:  Visit Vitals  BP (!) 174/101   Pulse 95   Temp 99 °F (37.2 °C)   Resp 20   Ht 5' 10\" (1.778 m)   Wt 84.8 kg (186 lb 15.2 oz)   SpO2 98%   BMI 26.82 kg/m²    O2 Flow Rate (L/min): 3 l/min O2 Device: Room air    Temp (24hrs), Av.8 °F (37.1 °C), Min:98.5 °F (36.9 °C), Max:99.1 °F (37.3 °C)    No intake/output data recorded.  1901 -  0700  In: -   Out: 46 [Urine:390]    General:  Alert, cooperative, no distress, appears stated age. Lungs:   Clear to auscultation bilaterally. Chest wall:  No tenderness or deformity. Heart:  Regular rate and rhythm, S1, S2 normal, no murmur, click, rub or gallop. Abdomen:   Soft, non-tender. Bowel sounds normal. No masses,  No organomegaly. Extremities: Extremities normal, atraumatic, no cyanosis or edema. Pulses: 2+ and symmetric all extremities. Skin: Skin color, texture, turgor normal. No rashes or lesions   Neurologic: CNII-XII intact. No gross sensory or motor deficits         CONSULTATIONS: Cardiology    Significant Diagnostic Studies:   3/19/2021: BUN 32 mg/dL* (Ref range: 6 - 20 mg/dL); Calcium 9.2 mg/dL (Ref range: 8.5 - 10.1 mg/dL); CO2 17 mmol/L* (Ref range: 21 - 32 mmol/L); Creatinine 2.19 mg/dL* (Ref range: 0.70 - 1.30 mg/dL); Glucose 297 mg/dL* (Ref range: 65 - 100 mg/dL); HCT 32.1 %* (Ref range: 41 - 53 %); HGB 10.0 g/dL* (Ref range: 13.5 - 17.5 g/dL); Potassium 4.0 mmol/L (Ref range: 3.5 - 5.1 mmol/L); Sodium 140 mmol/L (Ref range: 136 - 145 mmol/L)  3/20/2021: BUN 28 mg/dL* (Ref range: 6 - 20 mg/dL); Calcium 7.9 mg/dL* (Ref range: 8.5 - 10.1 mg/dL); CO2 20 mmol/L* (Ref range: 21 - 32 mmol/L); Creatinine 1.95 mg/dL* (Ref range: 0.70 - 1.30 mg/dL); Glucose 133 mg/dL* (Ref range: 65 - 100 mg/dL); HCT 26.7 %* (Ref range: 36.6 - 50.3 %);  HCT 26.6 %* (Ref range: 36.6 - 50.3 %); HGB 8.4 g/dL* (Ref range: 12.1 - 17.0 g/dL); HGB 8.3 g/dL* (Ref range: 12.1 - 17.0 g/dL); Potassium 3.8 mmol/L (Ref range: 3.5 - 5.1 mmol/L); Sodium 142 mmol/L (Ref range: 136 - 145 mmol/L)  Recent Labs     03/24/21 0425 03/22/21  0915   WBC 8.3 8.7   HGB 9.1* 8.8*   HCT 27.9* 28.0*   * 345     Recent Labs     03/24/21  0425 03/23/21  1046 03/22/21  0915 03/21/21  1155     --  138 140   K 3.7  --  3.6 4.1     --  106 107   CO2 23  --  23 25   BUN 52*  --  40* 38*   CREA 2.10*  --  2.32* 2.49*   *  --  204* 206*   CA 9.9  --  9.3 9.2   MG  --  2.1  --   --    PHOS  --   --  3.5 3.1     Recent Labs     03/22/21  0915 03/21/21  1155   ALB 2.3* 2.3*     No results for input(s): INR, PTP, APTT, INREXT in the last 72 hours. Recent Labs     03/22/21 0915   FERR 1,421*      No results for input(s): PH, PCO2, PO2 in the last 72 hours. No results for input(s): CPK, CKMB in the last 72 hours.     No lab exists for component: TROPONINI  Lab Results   Component Value Date/Time    Glucose (POC) 214 (H) 03/23/2021 04:02 PM    Glucose (POC) 247 (H) 03/23/2021 11:51 AM    Glucose (POC) 311 (H) 03/22/2021 07:47 PM    Glucose (POC) 188 (H) 03/21/2021 07:38 PM    Glucose (POC) 186 (H) 03/21/2021 03:58 PM       Total Time: 35 minutes    Signed:  Rosalva Staples MD  3/24/2021  10:55 AM

## 2021-03-24 NOTE — PROGRESS NOTES
Problem: Mobility Impaired (Adult and Pediatric)  Goal: *Acute Goals and Plan of Care (Insert Text)  Description: I with LE HEP x7 days  Mod I with all transfers x7 days  Amb 75-100ft with LRAD and SBAx1 x7 days without LOB  Outcome: Not Met    PHYSICAL THERAPY EVALUATION  Patient: Valdemar Willams [de-identified]80 y.o. male)  Date: 3/24/2021  Primary Diagnosis: CAP (community acquired pneumonia) [J18.9]  Procedure(s) (LRB):  LEFT HEART CATH / CORONARY ANGIOGRAPHY (N/A) 1 Day Post-Op   Precautions:        ASSESSMENT  68yo M admitted to hospital with pneumonia presents to PT with decreased bed mob, transfers, LE strength, gt, activity tolerance, balance, and overall functional mobility. PMH listed below. Pt lives with spouse in 1 story home with 3 steps to enter home with rails. PTA pt reports I with ADLs and amb without AD. Reported EF is 25%. Pt currently SBA with bed mob, CGA with transfers. Pt able to amb approx 35ft in room with RW and CGAx1. Pt demos improved balance with using RW, PT advised CM that pt would likely benefit from having one at home. Pt may benefit from skilled PT to address his functional deficits. Recommend d/c with HHPT upon d/c at this time. PLAN :  Recommendations and Planned Interventions: bed mobility training, transfer training, gait training, therapeutic exercises, patient and family training/education, and therapeutic activities      Frequency/Duration: Patient will be followed by physical therapy:  4 times a week to address goals.     Recommendation for discharge: (in order for the patient to meet his/her long term goals)  HHPT    IF patient discharges home will need the following DME: rolling walker         SUBJECTIVE:   Patient supine in bed upon PT arrival, agreeable to work with PT    OBJECTIVE DATA SUMMARY:   HISTORY:    Past Medical History:   Diagnosis Date    CKD (chronic kidney disease) stage 3, GFR 30-59 ml/min     Diabetes ([address]     Heart failure ([address]     Hypertension History reviewed. No pertinent surgical history. Personal factors and/or comorbidities impacting plan of care:     Home Situation  Home Environment: Private residence  # Steps to Enter: 3  Rails to Enter: Yes  One/Two Story Residence: One story  Living Alone: No  Support Systems: Spouse/Significant Other/Partner  Patient Expects to be Discharged to[de-identified] Private residence  Current DME Used/Available at Home: Glucometer, Other (comment)(BP )        EXAMINATION/PRESENTATION/DECISION MAKING:   Critical Behavior:  Neurologic State: Alert  Orientation Level: Oriented X4  Cognition: Appropriate decision making         Edema: none noted  Range Of Motion:  AROM: Within functional limits   B LE      Strength:    Strength: Generally decreased, functional  B LE grossly 4-/5    Tone & Sensation:   Sensation intact to LT B LE        Functional Mobility:  Bed Mobility:     Supine to Sit: Stand-by assistance  Sit to Supine: Stand-by assistance  Scooting: Stand-by assistance  Transfers:  Sit to Stand: Contact guard assistance  Stand to Sit: Contact guard assistance  Stand Pivot Transfers: Contact guard assistance          Ambulation/Gait Training:  Distance (ft): 35 Feet (ft)  Assistive Device: Walker, rolling  Ambulation - Level of Assistance: Contact guard assistance       Speed/Demetrice: Slow        Functional Measure:  [facility] [address] [id number] AM-PAC 6 Clicks         Basic Mobility Inpatient Short Form  How much difficulty does the patient currently have. .. Unable A Lot A Little None   1. Turning over in bed (including adjusting bedclothes, sheets and blankets)? [] 1   [] 2   [] 3   [x] 4   2. Sitting down on and standing up from a chair with arms ( e.g., wheelchair, bedside commode, etc.)   [] 1   [] 2   [x] 3   [] 4   3. Moving from lying on back to sitting on the side of the bed? [] 1   [] 2   [] 3   [x] 4          How much help from another person does the patient currently need. .. Total A Lot A Little None   4.   Moving to and from a bed to a chair (including a wheelchair)? [] 1   [] 2   [x] 3   [] 4   5. Need to walk in hospital room? [] 1   [] 2   [x] 3   [] 4   6. Climbing 3-5 steps with a railing? [] 1   [] 2   [x] 3   [] 4   © 2007, Trustees of [address] [place] [address] [address] [address], under license to [employer]. All rights reserved     Score:  Initial: 20 Most Recent: X (Date: -- )   Interpretation of Tool:  Represents activities that are increasingly more difficult (i.e. Bed mobility, Transfers, Gait). Score 24 23 22-20 19-15 14-10 9-7 6   Modifier CH CI CJ CK CL CM CN           Physical Therapy Evaluation Charge Determination   History Examination Presentation Decision-Making   MEDIUM  Complexity : 1-2 comorbidities / personal factors will impact the outcome/ POC  MEDIUM Complexity : 3 Standardized tests and measures addressing body structure, function, activity limitation and / or participation in recreation  LOW Complexity : Stable, uncomplicated  Other outcome measures [facility]  LOW       Based on the above components, the patient evaluation is determined to be of the following complexity level: LOW     Pain Rating:  No c/o pain during session today    Activity Tolerance:   Good  Please refer to the flowsheet for vital signs taken during this treatment. After treatment patient left in no apparent distress:   Supine in bed and Call bell within reach          COMMUNICATION/EDUCATION:   The patients plan of care was discussed with: Case management. Patient/family agree to work toward stated goals and plan of care.     Thank you for this referral.  Gila Preston   Time Calculation: 19 mins

## 2021-03-25 VITALS
SYSTOLIC BLOOD PRESSURE: 144 MMHG | DIASTOLIC BLOOD PRESSURE: 79 MMHG | HEIGHT: 70 IN | OXYGEN SATURATION: 97 % | WEIGHT: 186.29 LBS | HEART RATE: 86 BPM | BODY MASS INDEX: 26.67 KG/M2 | RESPIRATION RATE: 18 BRPM | TEMPERATURE: 98.8 F

## 2021-03-25 LAB
ANION GAP SERPL CALC-SCNC: 10 MMOL/L (ref 5–15)
BASOPHILS # BLD: 0 K/UL (ref 0–0.1)
BASOPHILS NFR BLD: 0 % (ref 0–1)
BUN SERPL-MCNC: 60 MG/DL (ref 6–20)
BUN/CREAT SERPL: 26 (ref 12–20)
CA-I BLD-MCNC: 9.3 MG/DL (ref 8.5–10.1)
CHLORIDE SERPL-SCNC: 102 MMOL/L (ref 97–108)
CO2 SERPL-SCNC: 24 MMOL/L (ref 21–32)
CREAT SERPL-MCNC: 2.34 MG/DL (ref 0.7–1.3)
DIFFERENTIAL METHOD BLD: ABNORMAL
EOSINOPHIL # BLD: 0.7 K/UL (ref 0–0.4)
EOSINOPHIL NFR BLD: 10 % (ref 0–7)
ERYTHROCYTE [DISTWIDTH] IN BLOOD BY AUTOMATED COUNT: 16.7 % (ref 11.5–14.5)
GLUCOSE BLD STRIP.AUTO-MCNC: 258 MG/DL (ref 65–100)
GLUCOSE BLD STRIP.AUTO-MCNC: 270 MG/DL (ref 65–100)
GLUCOSE SERPL-MCNC: 229 MG/DL (ref 65–100)
HCT VFR BLD AUTO: 27.1 % (ref 36.6–50.3)
HGB BLD-MCNC: 8.8 G/DL (ref 12.1–17)
IMM GRANULOCYTES # BLD AUTO: 0.3 K/UL (ref 0–0.04)
IMM GRANULOCYTES NFR BLD AUTO: 3 % (ref 0–0.5)
LYMPHOCYTES # BLD: 1.9 K/UL (ref 0.8–3.5)
LYMPHOCYTES NFR BLD: 24 % (ref 12–49)
MCH RBC QN AUTO: 23.7 PG (ref 26–34)
MCHC RBC AUTO-ENTMCNC: 32.5 G/DL (ref 30–36.5)
MCV RBC AUTO: 73 FL (ref 80–99)
MONOCYTES # BLD: 0.8 K/UL (ref 0–1)
MONOCYTES NFR BLD: 10 % (ref 5–13)
NEUTS SEG # BLD: 4.4 K/UL (ref 1.8–8)
NEUTS SEG NFR BLD: 56 % (ref 32–75)
PERFORMED BY, TECHID: ABNORMAL
PERFORMED BY, TECHID: ABNORMAL
PLATELET # BLD AUTO: 454 K/UL (ref 150–400)
POTASSIUM SERPL-SCNC: 3.7 MMOL/L (ref 3.5–5.1)
RBC # BLD AUTO: 3.71 M/UL (ref 4.1–5.7)
SODIUM SERPL-SCNC: 136 MMOL/L (ref 136–145)
WBC # BLD AUTO: 7.8 K/UL (ref 4.1–11.1)

## 2021-03-25 PROCEDURE — 97165 OT EVAL LOW COMPLEX 30 MIN: CPT

## 2021-03-25 PROCEDURE — 97530 THERAPEUTIC ACTIVITIES: CPT

## 2021-03-25 PROCEDURE — 36415 COLL VENOUS BLD VENIPUNCTURE: CPT

## 2021-03-25 PROCEDURE — 82962 GLUCOSE BLOOD TEST: CPT

## 2021-03-25 PROCEDURE — 74011250637 HC RX REV CODE- 250/637: Performed by: INTERNAL MEDICINE

## 2021-03-25 PROCEDURE — 80048 BASIC METABOLIC PNL TOTAL CA: CPT

## 2021-03-25 PROCEDURE — 85025 COMPLETE CBC W/AUTO DIFF WBC: CPT

## 2021-03-25 PROCEDURE — 74011250636 HC RX REV CODE- 250/636: Performed by: INTERNAL MEDICINE

## 2021-03-25 RX ADMIN — HYDRALAZINE HYDROCHLORIDE 50 MG: 50 TABLET, FILM COATED ORAL at 08:52

## 2021-03-25 RX ADMIN — ISOSORBIDE DINITRATE 10 MG: 10 TABLET ORAL at 08:52

## 2021-03-25 RX ADMIN — Medication 10 ML: at 05:01

## 2021-03-25 RX ADMIN — ENOXAPARIN SODIUM 40 MG: 100 INJECTION SUBCUTANEOUS at 08:51

## 2021-03-25 RX ADMIN — CLONIDINE HYDROCHLORIDE 0.1 MG: 0.1 TABLET ORAL at 08:52

## 2021-03-25 RX ADMIN — CARVEDILOL 12.5 MG: 12.5 TABLET, FILM COATED ORAL at 08:52

## 2021-03-25 RX ADMIN — AMIODARONE HYDROCHLORIDE 200 MG: 200 TABLET ORAL at 08:52

## 2021-03-25 RX ADMIN — FUROSEMIDE 60 MG: 40 TABLET ORAL at 08:52

## 2021-03-25 RX ADMIN — Medication 400 MG: at 08:52

## 2021-03-25 NOTE — PROGRESS NOTES
Silvino ([phone]) with [employer] called to inform us that he will be in by 10:15 am to fit the patient for vest. Patient informed. Daughter, Tate Shaikh was informed of this when she called for an update.  [phone]

## 2021-03-25 NOTE — PROGRESS NOTES
Progress Note      3/25/2021 3:18 PM  NAME: Marylene Folks   MRN:  [id number]   Admit Diagnosis: CAP (community acquired pneumonia) [J18.9]      Problem List:   Decompensated CHF due to systolic HF  EF 17%  Dilated cardiomyopathy  Non-sustained WCT ? VT on amiodarone  Chronic kidney disease  Essential hypertension     Assessment/Plan:   LifeVest due to nonsustained WCT and low EF (primary prevention)  Outpatient cath once renal function improves; Cre is worse today at 2.34 up from 2.10   Outpatient follow-up with Nephrology prior to LHC/contrast load         [x]       High complexity decision making was performed in this patient at high risk for decompensation with multiple organ involvement. Subjective:     Marylene Folks denies chest pain, dyspnea today. Awaiting Life Vest. [employer] rep will place it today. Review of Systems:   Negative except for as noted above. Objective:      Physical Exam:    Last 24hrs VS reviewed since prior progress note. Most recent are:    Visit Vitals  BP (!) 144/79 (BP 1 Location: Right upper arm, BP Patient Position: At rest;Supine)   Pulse 86   Temp 98.8 °F (37.1 °C)   Resp 18   Ht 5' 10\" (1.778 m)   Wt 84.5 kg (186 lb 4.6 oz)   SpO2 97%   BMI 26.73 kg/m²       Intake/Output Summary (Last 24 hours) at 3/25/2021 1012  Last data filed at 3/24/2021 2342  Gross per 24 hour   Intake    Output 100 ml   Net -100 ml        General Appearance: Alert; no acute distress. Ears/Nose/Mouth/Throat: Hearing grossly normal; moist mucous membranes  Neck: Supple. Chest: Bilateral coarse rhonchi. Cardiovascular: Regular rate and rhythm, S1S2 normal, soft systolic murmur  Abdomen: Soft, non-tender, bowel sounds are active. Extremities: No edema bilaterally. Skin: Warm and dry. []         Post-cath site without hematoma, bruit, tenderness, or thrill. Distal pulses intact.     PMH/SH reviewed - no change compared to H&P        Telemetry: Sinus rhythm      []  No new EKG for review    Lab Data Personally Reviewed:    Recent Labs     03/25/21 0405 03/24/21 0425   WBC 7.8 8.3   HGB 8.8* 9.1*   HCT 27.1* 27.9*   * 469*     No results for input(s): INR, PTP, APTT, INREXT, INREXT in the last 72 hours. Recent Labs     03/25/21 0405 03/24/21 0425 03/23/21  1046    138  --    K 3.7 3.7  --     105  --    CO2 24 23  --    BUN 60* 52*  --    CREA 2.34* 2.10*  --    * 202*  --    CA 9.3 9.9  --    MG  --   --  2.1     No results for input(s): CPK, CKNDX, TROIQ in the last 72 hours. No lab exists for component: CPKMB  No results found for: CHOL, CHOLX, CHLST, CHOLV, HDL, HDLP, LDL, LDLC, DLDLP, TGLX, TRIGL, TRIGP, CHHD, CHHDX    No results for input(s): AP, TBIL, TP, ALB, GLOB, GGT, AML, LPSE in the last 72 hours. No lab exists for component: SGOT, GPT, AMYP, HLPSE  No results for input(s): PH, PCO2, PO2 in the last 72 hours.     Medications Personally Reviewed:    Current Facility-Administered Medications   Medication Dose Route Frequency    hydrALAZINE (APRESOLINE) tablet 50 mg  50 mg Oral TID    cloNIDine HCL (CATAPRES) tablet 0.1 mg  0.1 mg Oral BID    amiodarone (CORDARONE) tablet 200 mg  200 mg Oral DAILY    magnesium oxide (MAG-OX) tablet 400 mg  400 mg Oral BID    carvediloL (COREG) tablet 12.5 mg  12.5 mg Oral BID WITH MEALS    furosemide (LASIX) tablet 60 mg  60 mg Oral DAILY    isosorbide dinitrate (ISORDIL) tablet 10 mg  10 mg Oral TID    sodium chloride (NS) flush 5-40 mL  5-40 mL IntraVENous Q8H    sodium chloride (NS) flush 5-40 mL  5-40 mL IntraVENous PRN    acetaminophen (TYLENOL) tablet 650 mg  650 mg Oral Q4H PRN    enoxaparin (LOVENOX) injection 40 mg  40 mg SubCUTAneous DAILY    ondansetron (ZOFRAN) injection 4 mg  4 mg IntraVENous Q6H PRN    docusate sodium (COLACE) capsule 100 mg  100 mg Oral PRN         Amada Guan MD

## 2021-03-25 NOTE — PROGRESS NOTES
Problem: Self Care Deficits Care Plan (Adult)  Goal: *Acute Goals and Plan of Care (Insert Text)  Description: Pt will be MI sup<->sit in prep for EOB ADL's  Pt will be MI  LB dressing sitting/standing  Pt will be MI  sit<-> prep for toilet transfer  Pt will be MI  toilet transfer with LRAD  Pt will be MI  toileting/cloth mgmt LRAD  Pt will be MI  grooming standing sink  Pt will be MI bathing sitting/standing sink LRAD  Pt will be MI maribell UE HEP in prep for self care tasks      Outcome: Not Met     OCCUPATIONAL THERAPY EVALUATION  Patient: More Mckeon [de-identified]80 y.o. male)  Date: 3/25/2021  Primary Diagnosis: CAP (community acquired pneumonia) [J18.9]  Procedure(s) (LRB):  LEFT HEART CATH / CORONARY ANGIOGRAPHY (N/A) 2 Days Post-Op   Precautions: Falls       ASSESSMENT  Pt is 81 y/o male came [facility] from [facility] with SOBx3 days, nonproductive cough and adm 3/19/2021 for severe maribell CAP, acute respiraotry failure with hypoxia, accelerated HTN, sepsis with tachycardia, lactic acidosis, DM type II, anemia of chronic disease, nonsuspatined wide complex tachycardia suspected v-tach awaiting lifevest placement. Pt has hx of CKD stage III, DM, HF, HTN. Pt received semi supine in bed A&Ox 4 and agreeable for OT eval/tx. Per pt report, pt lives with spouse in mobile home with 3 steps maribell rails to enter and is independent for self care and functional transfers/mobility (has access to shower chair if needed). Pt currently presents with decreased balance (new use of RW which was recommended by PT), decreased activity tolerance, generalized weakness and increased need for assist with self care (SBA LB dressing EOB, SBA toileting/cloth mgmt simulated, SBA grooming standing sink simulated) and functional transfers/mobility (SBA sup<->sit and scooting EOB, SBA sit<->stand and toilet transfer with Rw and gait belt). Pt educated on energy conservation techniques wiht pt verbalizing understanding.  Pt would benefit from skilled OT services [facility] in order to increase safety and independence with self care and functional transfers/mobility. Recommend discharge to home with [address] [place] [address] when medically appropriate. Other factors to consider for discharge: time since onset, close to baseline functional status        PLAN :  Recommendations and Planned Interventions: self care training, functional mobility training, therapeutic exercise, balance training, therapeutic activities, endurance activities, patient education, and home safety training    Frequency/Duration: Patient will be followed by occupational therapy 2 times a week to address goals. Recommendation for discharge: (in order for the patient to meet his/her long term goals)  HHOT    This discharge recommendation:  Has been made in collaboration with the attending provider and/or case management    IF patient discharges home will need the following DME: RW per PT note       SUBJECTIVE:   Patient stated I didn't need any help before I got sick.     OBJECTIVE DATA SUMMARY:   HISTORY:   Past Medical History:   Diagnosis Date    CKD (chronic kidney disease) stage 3, GFR 30-59 ml/min     Diabetes ([facility]     Heart failure ([facility]     Hypertension    History reviewed. No pertinent surgical history. Expanded or extensive additional review of patient history:     Home Situation  Home Environment: Trailer/mobile home  # Steps to Enter: 3  Rails to Enter: Yes  Hand Rails : Bilateral  One/Two Story Residence: One story  Living Alone: No  Support Systems: Spouse/Significant Other/Partner  Patient Expects to be Discharged to[de-identified] Private residence  Current DME Used/Available at Home: Shower chair    PLOF: Pt independent for ADLS/IADLS, independent with mobility prior to admission. EXAMINATION OF PERFORMANCE DEFICITS:  Cognitive/Behavioral Status:  Neurologic State: Alert  Orientation Level: Oriented X4     Hearing:   Auditory  Auditory Impairment: None    Range of Motion:  AROM: Within functional limits     Strength:  Strength: Generally decreased, functional(grossly observed to be 3+/5)     Balance:  Sitting: Intact  Standing: Intact; With support  Standing - Static: Constant support;Good  Standing - Dynamic : Constant support;Good    Functional Mobility and Transfers for ADLs:  Bed Mobility:  Rolling: Modified independent  Supine to Sit: Stand-by assistance  Sit to Supine: Stand-by assistance  Scooting: Stand-by assistance    Transfers:  Sit to Stand: Stand-by assistance  Stand to Sit: Stand-by assistance  Bed to Chair: Stand-by assistance  Bathroom Mobility: Stand-by assistance  Toilet Transfer : Stand-by assistance  Assistive Device : Gait Belt;Walker, rolling    ADL Assessment:     Oral Facial Hygiene/Grooming: Stand-by assistance(simulated)    Lower Body Dressing: Stand-by assistance    Toileting: Stand by assistance(simulated)     ADL Intervention and task modifications:     Grooming  Grooming Assistance: Stand-by assistance(simulated)  Position Performed: Standing    Lower Body Dressing Assistance  Socks: Stand-by assistance  Position Performed: Seated edge of bed    Toileting  Toileting Assistance: Stand-by assistance(simulated)       [address] [place] [id number] AM-PAC \"6 Clicks\"                                                       Daily Activity Inpatient Short Form  How much help from another person does the patient currently need. .. Total; A Lot A Little None   1. Putting on and taking off regular lower body clothing? []  1 []  2 []  3 [x]  4   2. Bathing (including washing, rinsing, drying)? []  1 []  2 [x]  3 []  4   3. Toileting, which includes using toilet, bedpan or urinal? [] 1 []  2 [x]  3 []  4   4. Putting on and taking off regular upper body clothing? []  1 []  2 []  3 [x]  4   5. Taking care of personal grooming such as brushing teeth? []  1 []  2 [x]  3 []  4   6. Eating meals?  []  1 []  2 []  3 [x]  4   © 2007, Trustees of [address] [place] [address] [address] [address], under license to [employer]. All rights reserved     Score: 21/24     Interpretation of Tool:  Represents clinically-significant functional categories (i.e. Activities of daily living). Percentage of Impairment CH    0%   CI    1-19% CJ    20-39% CK    40-59% CL    60-79% CM    80-99% CN     100%   [facility]  Score 6-24 24 23 20-22 15-19 10-14 7-9 6        Occupational Therapy Evaluation Charge Determination   History Examination Decision-Making   LOW Complexity : Brief history review  LOW Complexity : 1-3 performance deficits relating to physical, cognitive , or psychosocial skils that result in activity limitations and / or participation restrictions  LOW Complexity : No comorbidities that affect functional and no verbal or physical assistance needed to complete eval tasks       Based on the above components, the patient evaluation is determined to be of the following complexity level: LOW   Pain Rating:  No pain reported    Activity Tolerance:   Good and requires rest breaks  Please refer to the flowsheet for vital signs taken during this treatment. After treatment patient left in no apparent distress:    Supine in bed and Call bell within reach    COMMUNICATION/EDUCATION:   The patients plan of care was discussed with: Registered nurse. Home safety education was provided and the patient/caregiver indicated understanding. and Patient/family have participated as able in goal setting and plan of care. This patients plan of care is appropriate for delegation to [facility].     Thank you for this referral.  Shravan Espinoza  Time Calculation: 20 mins

## 2021-03-25 NOTE — PROGRESS NOTES
Problem: Falls - Risk of  Goal: *Absence of Falls  Description: Document Lore Araiza Fall Risk and appropriate interventions in the flowsheet.   Outcome: Progressing Towards Goal  Note: Fall Risk Interventions:  Mobility Interventions: Bed/chair exit alarm         Medication Interventions: Bed/chair exit alarm, Patient to call before getting OOB    Elimination Interventions: Call light in reach

## 2021-03-25 NOTE — PROGRESS NOTES
Patient being discharged home with [facility]. Patient discharge instructions and medications reviewed with patient and wife at bedside. Patient was fitted for his life vest this morning. Follow up appointments reviewed with patient and wife. IV removed and tele box removed. Discharge plan of care/case management plan validated with provider discharge order.

## 2021-03-25 NOTE — PROGRESS NOTES
Phase 2 outpatient cardiac rehab referral is not appropriate for this patient at this time due to physical and/or mental limitations, diagnosis and/or treatment plans (outpatient cath pending)

## 2021-03-25 NOTE — DISCHARGE SUMMARY
Physician Discharge Summary     Patient ID:    Marvel Hernandes  [id number]  89 y.o.  1950    Admit date: 3/19/2021    Discharge date : 3/25/2021    Chronic Diagnoses:    Problem List as of 3/25/2021 Never Reviewed          Codes Class Noted - Resolved    CAP (community acquired pneumonia) ICD-10-CM: J18.9  ICD-9-CM: 486  3/19/2021 - Present          22    Final Diagnoses:   CAP (community acquired pneumonia) [J18.9]  Acute on chronic systolic heart failure, EF 25%. Improving, patient does not appear volume overloaded currently     Possible component of bilateral community-acquired pneumonia     Acute respiratory failure with hypoxia, improving     Accelerated hypertension     Sepsis with tachycardia, leukocytosis and tachypnea     Lactic acidosis due to hypoxia     Diabetes mellitus type 2     Chronic kidney disease stage III     Anemia of chronic kidney disease     Nonsustained ventricular tachycardia  Reason for Hospitalization:  Shortness of breath      Hospital Course:   Per H and P,\"80year-old male with a history of hypertension and unspecified heart failure who was transferred from [facility] where he presented with a 2 to 3-day history of worsening shortness of breath and nonproductive cough. He denies fever. He does note increased lower extremity edema. No chest pain. EMS found him to be hypoxic with saturations in the 70s. On a nonrebreather his oxygenation improved. On arrival he was started on BiPAP and given IV furosemide after which she was able to be weaned to high flow oxygen. Chest x-ray showed bilateral infiltrates. White count was 17,000\". He was admitted to medical telemetry floor and treated with IV diuretics and IV antibiotics. Over the course of his hospitalization, symptoms have improved tremendously. Repeat 2D echocardiogram showed an EF of 25%.   ACE inhibitors could not be started due to acute on chronic kidney disease however hydralazine and isosorbide mononitrate have been added to medication regimen. Patient advised close follow-up with cardiologist for possible outpatient cardiac catheterization. He has been advised on low-salt diet and compliance with home medication regimen. Due to runs of [facility], patient will go home with life vest and follow up out patient with Dr Robert Farmer. Amiodarone 200 mg daily added to medication regimen. Discharge Medications:   Current Discharge Medication List      START taking these medications    Details   carvediloL (COREG) 12.5 mg tablet Take 1 Tab by mouth two (2) times daily (with meals) for 30 days. Qty: 60 Tab, Refills: 1      furosemide (LASIX) 40 mg tablet Take 1.5 Tabs by mouth daily for 30 days. Qty: 90 Tab, Refills: 1      hydrALAZINE (APRESOLINE) 50 mg tablet Take 1 Tab by mouth three (3) times daily for 30 days. Qty: 90 Tab, Refills: 1      isosorbide dinitrate (ISORDIL) 10 mg tablet Take 1 Tab by mouth three (3) times daily for 30 days. Qty: 90 Tab, Refills: 1      magnesium oxide (MAG-OX) 400 mg tablet Take 1 Tab by mouth two (2) times a day for 14 days. Qty: 28 Tab, Refills: 0      doxycycline (MONODOX) 100 mg capsule Take 1 Cap by mouth two (2) times a day for 7 days. Qty: 14 Cap, Refills: 0      potassium chloride SR (K-TAB) 20 mEq tablet Take 1 Tab by mouth daily for 30 days. Qty: 30 Tab, Refills: 0      amiodarone (CORDARONE) 200 mg tablet Take 1 Tab by mouth daily for 30 days. Qty: 30 Tab, Refills: 0         CONTINUE these medications which have NOT CHANGED    Details   cloNIDine HCL (CATAPRES) 0.1 mg tablet Take  by mouth two (2) times a day. allopurinoL (ZYLOPRIM) 300 mg tablet Take  by mouth daily. glipiZIDE SR (GLUCOTROL XL) 10 mg CR tablet Take 20 mg by mouth daily.          STOP taking these medications       labetaloL (NORMODYNE) 300 mg tablet Comments:   Reason for Stopping:         amLODIPine (NORVASC) 10 mg tablet Comments:   Reason for Stopping: lisinopriL (PRINIVIL, ZESTRIL) 20 mg tablet Comments:   Reason for Stopping: Follow up Care:    1. Other, MD Jayden in 1-2 weeks. Please call to set up an appointment shortly after discharge. Diet:  Cardiac Diet    Disposition:  Home. Advanced Directive:   FULL x   DNR      Discharge Exam:  Visit Vitals  BP (!) 144/79 (BP 1 Location: Right upper arm, BP Patient Position: At rest;Supine)   Pulse 86   Temp 98.8 °F (37.1 °C)   Resp 18   Ht 5' 10\" (1.778 m)   Wt 84.5 kg (186 lb 4.6 oz)   SpO2 97%   BMI 26.73 kg/m²    O2 Flow Rate (L/min): 3 l/min O2 Device: Room air    Temp (24hrs), Av.3 °F (36.8 °C), Min:98 °F (36.7 °C), Max:98.8 °F (37.1 °C)    No intake/output data recorded.  1901 -  0700  In: -   Out: 100 [Urine:100]    General:  Alert, cooperative, no distress, appears stated age. Lungs:   Clear to auscultation bilaterally. Chest wall:  No tenderness or deformity. Heart:  Regular rate and rhythm, S1, S2 normal, no murmur, click, rub or gallop. Abdomen:   Soft, non-tender. Bowel sounds normal. No masses,  No organomegaly. Extremities: Extremities normal, atraumatic, no cyanosis or edema. Pulses: 2+ and symmetric all extremities. Skin: Skin color, texture, turgor normal. No rashes or lesions   Neurologic: CNII-XII intact. No gross sensory or motor deficits         CONSULTATIONS: Cardiology    Significant Diagnostic Studies:   3/19/2021: BUN 32 mg/dL* (Ref range: 6 - 20 mg/dL); Calcium 9.2 mg/dL (Ref range: 8.5 - 10.1 mg/dL); CO2 17 mmol/L* (Ref range: 21 - 32 mmol/L); Creatinine 2.19 mg/dL* (Ref range: 0.70 - 1.30 mg/dL); Glucose 297 mg/dL* (Ref range: 65 - 100 mg/dL); HCT 32.1 %* (Ref range: 41 - 53 %); HGB 10.0 g/dL* (Ref range: 13.5 - 17.5 g/dL); Potassium 4.0 mmol/L (Ref range: 3.5 - 5.1 mmol/L); Sodium 140 mmol/L (Ref range: 136 - 145 mmol/L)  3/20/2021: BUN 28 mg/dL* (Ref range: 6 - 20 mg/dL);  Calcium 7.9 mg/dL* (Ref range: 8.5 - 10.1 mg/dL); CO2 20 mmol/L* (Ref range: 21 - 32 mmol/L); Creatinine 1.95 mg/dL* (Ref range: 0.70 - 1.30 mg/dL); Glucose 133 mg/dL* (Ref range: 65 - 100 mg/dL); HCT 26.7 %* (Ref range: 36.6 - 50.3 %); HCT 26.6 %* (Ref range: 36.6 - 50.3 %); HGB 8.4 g/dL* (Ref range: 12.1 - 17.0 g/dL); HGB 8.3 g/dL* (Ref range: 12.1 - 17.0 g/dL); Potassium 3.8 mmol/L (Ref range: 3.5 - 5.1 mmol/L); Sodium 142 mmol/L (Ref range: 136 - 145 mmol/L)  Recent Labs     03/25/21 0405 03/24/21 0425   WBC 7.8 8.3   HGB 8.8* 9.1*   HCT 27.1* 27.9*   * 469*     Recent Labs     03/25/21 0405 03/24/21 0425 03/23/21  1046    138  --    K 3.7 3.7  --     105  --    CO2 24 23  --    BUN 60* 52*  --    CREA 2.34* 2.10*  --    * 202*  --    CA 9.3 9.9  --    MG  --   --  2.1     No results for input(s): ALT, AP, TBIL, TBILI, TP, ALB, GLOB, GGT, AML, LPSE in the last 72 hours. No lab exists for component: SGOT, GPT, AMYP, HLPSE  No results for input(s): INR, PTP, APTT, INREXT, INREXT in the last 72 hours. No results for input(s): FE, TIBC, PSAT, FERR in the last 72 hours. No results for input(s): PH, PCO2, PO2 in the last 72 hours. No results for input(s): CPK, CKMB in the last 72 hours.     No lab exists for component: TROPONINI  Lab Results   Component Value Date/Time    Glucose (POC) 270 (H) 03/25/2021 08:46 AM    Glucose (POC) 214 (H) 03/23/2021 04:02 PM    Glucose (POC) 247 (H) 03/23/2021 11:51 AM    Glucose (POC) 311 (H) 03/22/2021 07:47 PM    Glucose (POC) 188 (H) 03/21/2021 07:38 PM       Total Time: 35 minutes    Signed:  Carmenza Villanueva MD  3/25/2021  10:55 AM

## 2021-03-25 NOTE — PROGRESS NOTES
Patient is currently getting fitted for his life vest at the bedside. Patient has been informed that he can  his walker from [facility] medical upon discharge from the hospital.  Discharge checklist has been initiated. Patient has been accepted for [facility] through [facility] [phone]. CM has sent them all required documentation to initiate services. Discharge plan of care/case management plan validated with provider discharge order.

## 2021-03-30 NOTE — PROGRESS NOTES
Pt has life vest. Pt has appointment with Kidney specialist on 4/5/2021 @1645 pm, ok with Dr Brandon Lyman to have covid swab on 4/6/2021.

## 2021-04-06 ENCOUNTER — HOSPITAL ENCOUNTER (OUTPATIENT)
Dept: PREADMISSION TESTING | Age: 71
Discharge: HOME | End: 2021-04-06
Payer: MEDICARE

## 2021-04-06 PROCEDURE — U0003 INFECTIOUS AGENT DETECTION BY NUCLEIC ACID (DNA OR RNA); SEVERE ACUTE RESPIRATORY SYNDROME CORONAVIRUS 2 (SARS-COV-2) (CORONAVIRUS DISEASE [COVID-19]), AMPLIFIED PROBE TECHNIQUE, MAKING USE OF HIGH THROUGHPUT TECHNOLOGIES AS DESCRIBED BY CMS-2020-01-R: HCPCS

## 2021-04-07 LAB — SARS-COV-2, COV2: NORMAL

## 2021-04-08 LAB — SARS-COV-2, COV2NT: NOT DETECTED

## 2021-04-09 ENCOUNTER — HOSPITAL ENCOUNTER (OUTPATIENT)
Age: 71
Discharge: HOME | End: 2021-04-10
Attending: INTERNAL MEDICINE | Admitting: INTERNAL MEDICINE
Payer: MEDICARE

## 2021-04-09 DIAGNOSIS — I42.9 CARDIOMYOPATHY, UNSPECIFIED TYPE (HCC): ICD-10-CM

## 2021-04-09 LAB
ACT BLD: 229 SEC (ref 74–125)
ALBUMIN SERPL-MCNC: 3 G/DL (ref 3.5–5)
ALBUMIN/GLOB SERPL: 0.7 {RATIO} (ref 1.1–2.2)
ALP SERPL-CCNC: 82 U/L (ref 45–117)
ALT SERPL-CCNC: 15 U/L (ref 12–78)
ANION GAP SERPL CALC-SCNC: 9 MMOL/L (ref 5–15)
APPEARANCE UR: CLEAR
APTT PPP: 44.5 SEC (ref 23–35.7)
AST SERPL W P-5'-P-CCNC: 13 U/L (ref 15–37)
ATRIAL RATE: 64 BPM
BACTERIA URNS QL MICRO: NEGATIVE /HPF
BILIRUB SERPL-MCNC: 0.5 MG/DL (ref 0.2–1)
BILIRUB UR QL: NEGATIVE
BUN SERPL-MCNC: 47 MG/DL (ref 6–20)
BUN/CREAT SERPL: 22 (ref 12–20)
CA-I BLD-MCNC: 9.9 MG/DL (ref 8.5–10.1)
CALCULATED P AXIS, ECG09: 54 DEGREES
CALCULATED R AXIS, ECG10: -38 DEGREES
CALCULATED T AXIS, ECG11: -176 DEGREES
CHLORIDE SERPL-SCNC: 106 MMOL/L (ref 97–108)
CO2 SERPL-SCNC: 22 MMOL/L (ref 21–32)
COLOR UR: ABNORMAL
CREAT SERPL-MCNC: 2.11 MG/DL (ref 0.7–1.3)
DIAGNOSIS, 93000: NORMAL
ERYTHROCYTE [DISTWIDTH] IN BLOOD BY AUTOMATED COUNT: 16.5 % (ref 11.5–14.5)
GLOBULIN SER CALC-MCNC: 4.5 G/DL (ref 2–4)
GLUCOSE BLD STRIP.AUTO-MCNC: 159 MG/DL (ref 65–100)
GLUCOSE BLD STRIP.AUTO-MCNC: 194 MG/DL (ref 65–100)
GLUCOSE SERPL-MCNC: 193 MG/DL (ref 65–100)
GLUCOSE UR STRIP.AUTO-MCNC: NEGATIVE MG/DL
HCT VFR BLD AUTO: 31.3 % (ref 36.6–50.3)
HGB BLD-MCNC: 9.6 G/DL (ref 12.1–17)
HGB UR QL STRIP: NEGATIVE
INR PPP: 1.2 (ref 0.9–1.1)
KETONES UR QL STRIP.AUTO: NEGATIVE MG/DL
LEUKOCYTE ESTERASE UR QL STRIP.AUTO: NEGATIVE
MCH RBC QN AUTO: 23.5 PG (ref 26–34)
MCHC RBC AUTO-ENTMCNC: 30.7 G/DL (ref 30–36.5)
MCV RBC AUTO: 76.7 FL (ref 80–99)
MUCOUS THREADS URNS QL MICRO: ABNORMAL /LPF
NITRITE UR QL STRIP.AUTO: NEGATIVE
P-R INTERVAL, ECG05: 196 MS
PERFORMED BY, TECHID: ABNORMAL
PH UR STRIP: 5 [PH] (ref 5–8)
PLATELET # BLD AUTO: 304 K/UL (ref 150–400)
POTASSIUM SERPL-SCNC: 4.3 MMOL/L (ref 3.5–5.1)
PROT SERPL-MCNC: 7.5 G/DL (ref 6.4–8.2)
PROT UR STRIP-MCNC: 30 MG/DL
PROTHROMBIN TIME: 15 SEC (ref 11.9–14.7)
Q-T INTERVAL, ECG07: 460 MS
QRS DURATION, ECG06: 104 MS
QTC CALCULATION (BEZET), ECG08: 474 MS
RBC # BLD AUTO: 4.08 M/UL (ref 4.1–5.7)
RBC #/AREA URNS HPF: ABNORMAL /HPF (ref 0–5)
SODIUM SERPL-SCNC: 137 MMOL/L (ref 136–145)
SP GR UR REFRACTOMETRY: 1.01 (ref 1–1.03)
THERAPEUTIC RANGE,PTTT: ABNORMAL SEC (ref 68–109)
UA: UC IF INDICATED,UAUC: ABNORMAL
UROBILINOGEN UR QL STRIP.AUTO: 0.1 EU/DL (ref 0.1–1)
VENTRICULAR RATE, ECG03: 64 BPM
WBC # BLD AUTO: 7 K/UL (ref 4.1–11.1)
WBC URNS QL MICRO: ABNORMAL /HPF (ref 0–4)

## 2021-04-09 PROCEDURE — 77030016699 HC CATH ANGI DX INFN1 CARD -A: Performed by: INTERNAL MEDICINE

## 2021-04-09 PROCEDURE — 99152 MOD SED SAME PHYS/QHP 5/>YRS: CPT | Performed by: INTERNAL MEDICINE

## 2021-04-09 PROCEDURE — 74011000250 HC RX REV CODE- 250: Performed by: INTERNAL MEDICINE

## 2021-04-09 PROCEDURE — 76210000016 HC OR PH I REC 1 TO 1.5 HR: Performed by: INTERNAL MEDICINE

## 2021-04-09 PROCEDURE — C1894 INTRO/SHEATH, NON-LASER: HCPCS | Performed by: INTERNAL MEDICINE

## 2021-04-09 PROCEDURE — C1769 GUIDE WIRE: HCPCS | Performed by: INTERNAL MEDICINE

## 2021-04-09 PROCEDURE — 93453 R&L HRT CATH W/VENTRICLGRPHY: CPT | Performed by: INTERNAL MEDICINE

## 2021-04-09 PROCEDURE — C1760 CLOSURE DEV, VASC: HCPCS | Performed by: INTERNAL MEDICINE

## 2021-04-09 PROCEDURE — 82962 GLUCOSE BLOOD TEST: CPT

## 2021-04-09 PROCEDURE — 92928 PRQ TCAT PLMT NTRAC ST 1 LES: CPT | Performed by: INTERNAL MEDICINE

## 2021-04-09 PROCEDURE — C1887 CATHETER, GUIDING: HCPCS | Performed by: INTERNAL MEDICINE

## 2021-04-09 PROCEDURE — 93005 ELECTROCARDIOGRAM TRACING: CPT

## 2021-04-09 PROCEDURE — 93571 IV DOP VEL&/PRESS C FLO 1ST: CPT | Performed by: INTERNAL MEDICINE

## 2021-04-09 PROCEDURE — 77030004558 HC CATH ANGI DX SUPR TORQ CARD -A: Performed by: INTERNAL MEDICINE

## 2021-04-09 PROCEDURE — 77030019698 HC SYR ANGI MDLON MRTM -A: Performed by: INTERNAL MEDICINE

## 2021-04-09 PROCEDURE — 85610 PROTHROMBIN TIME: CPT

## 2021-04-09 PROCEDURE — 77030015766: Performed by: INTERNAL MEDICINE

## 2021-04-09 PROCEDURE — 85730 THROMBOPLASTIN TIME PARTIAL: CPT

## 2021-04-09 PROCEDURE — 2709999900 HC NON-CHARGEABLE SUPPLY: Performed by: INTERNAL MEDICINE

## 2021-04-09 PROCEDURE — 77030029065 HC DRSG HEMO QCLOT ZMED -B: Performed by: INTERNAL MEDICINE

## 2021-04-09 PROCEDURE — 85347 COAGULATION TIME ACTIVATED: CPT

## 2021-04-09 PROCEDURE — 77030004522 HC CATH ANGI DX EXPO BSC -A: Performed by: INTERNAL MEDICINE

## 2021-04-09 PROCEDURE — 81001 URINALYSIS AUTO W/SCOPE: CPT

## 2021-04-09 PROCEDURE — 74011250636 HC RX REV CODE- 250/636: Performed by: INTERNAL MEDICINE

## 2021-04-09 PROCEDURE — C1874 STENT, COATED/COV W/DEL SYS: HCPCS | Performed by: INTERNAL MEDICINE

## 2021-04-09 PROCEDURE — 99153 MOD SED SAME PHYS/QHP EA: CPT | Performed by: INTERNAL MEDICINE

## 2021-04-09 PROCEDURE — 80053 COMPREHEN METABOLIC PANEL: CPT

## 2021-04-09 PROCEDURE — 85027 COMPLETE CBC AUTOMATED: CPT

## 2021-04-09 PROCEDURE — 74011250637 HC RX REV CODE- 250/637: Performed by: INTERNAL MEDICINE

## 2021-04-09 DEVICE — STENT COR DES 3.50X12MM -- DES RESOLUTE ONYX: Type: IMPLANTABLE DEVICE | Site: CORONARY | Status: FUNCTIONAL

## 2021-04-09 RX ORDER — FENTANYL CITRATE 50 UG/ML
INJECTION, SOLUTION INTRAMUSCULAR; INTRAVENOUS AS NEEDED
Status: DISCONTINUED | OUTPATIENT
Start: 2021-04-09 | End: 2021-04-09 | Stop reason: HOSPADM

## 2021-04-09 RX ORDER — GLIPIZIDE 5 MG/1
10 TABLET ORAL
Status: DISCONTINUED | OUTPATIENT
Start: 2021-04-09 | End: 2021-04-10 | Stop reason: HOSPADM

## 2021-04-09 RX ORDER — AMIODARONE HYDROCHLORIDE 200 MG/1
200 TABLET ORAL DAILY
Status: DISCONTINUED | OUTPATIENT
Start: 2021-04-10 | End: 2021-04-10 | Stop reason: HOSPADM

## 2021-04-09 RX ORDER — ALLOPURINOL 300 MG/1
300 TABLET ORAL DAILY
Status: DISCONTINUED | OUTPATIENT
Start: 2021-04-10 | End: 2021-04-10 | Stop reason: HOSPADM

## 2021-04-09 RX ORDER — SODIUM CHLORIDE 0.9 % (FLUSH) 0.9 %
5-40 SYRINGE (ML) INJECTION EVERY 8 HOURS
Status: DISCONTINUED | OUTPATIENT
Start: 2021-04-09 | End: 2021-04-10 | Stop reason: HOSPADM

## 2021-04-09 RX ORDER — LIDOCAINE HYDROCHLORIDE 10 MG/ML
INJECTION INFILTRATION; PERINEURAL AS NEEDED
Status: DISCONTINUED | OUTPATIENT
Start: 2021-04-09 | End: 2021-04-09 | Stop reason: HOSPADM

## 2021-04-09 RX ORDER — CLOPIDOGREL 300 MG/1
TABLET, FILM COATED ORAL AS NEEDED
Status: DISCONTINUED | OUTPATIENT
Start: 2021-04-09 | End: 2021-04-09 | Stop reason: HOSPADM

## 2021-04-09 RX ORDER — AMLODIPINE BESYLATE 5 MG/1
5 TABLET ORAL DAILY
Status: DISCONTINUED | OUTPATIENT
Start: 2021-04-09 | End: 2021-04-10 | Stop reason: HOSPADM

## 2021-04-09 RX ORDER — CLOPIDOGREL BISULFATE 75 MG/1
75 TABLET ORAL DAILY
Status: DISCONTINUED | OUTPATIENT
Start: 2021-04-10 | End: 2021-04-09

## 2021-04-09 RX ORDER — HEPARIN SODIUM 200 [USP'U]/100ML
INJECTION, SOLUTION INTRAVENOUS
Status: COMPLETED | OUTPATIENT
Start: 2021-04-09 | End: 2021-04-09

## 2021-04-09 RX ORDER — LANOLIN ALCOHOL/MO/W.PET/CERES
400 CREAM (GRAM) TOPICAL DAILY
Status: DISCONTINUED | OUTPATIENT
Start: 2021-04-10 | End: 2021-04-10 | Stop reason: HOSPADM

## 2021-04-09 RX ORDER — FUROSEMIDE 40 MG/1
40 TABLET ORAL DAILY
Status: DISCONTINUED | OUTPATIENT
Start: 2021-04-10 | End: 2021-04-10 | Stop reason: HOSPADM

## 2021-04-09 RX ORDER — CARVEDILOL 12.5 MG/1
12.5 TABLET ORAL 2 TIMES DAILY WITH MEALS
Status: DISCONTINUED | OUTPATIENT
Start: 2021-04-09 | End: 2021-04-10 | Stop reason: HOSPADM

## 2021-04-09 RX ORDER — SODIUM CHLORIDE 450 MG/100ML
100 INJECTION, SOLUTION INTRAVENOUS CONTINUOUS
Status: DISCONTINUED | OUTPATIENT
Start: 2021-04-09 | End: 2021-04-10 | Stop reason: HOSPADM

## 2021-04-09 RX ORDER — VERAPAMIL HYDROCHLORIDE 2.5 MG/ML
INJECTION, SOLUTION INTRAVENOUS AS NEEDED
Status: DISCONTINUED | OUTPATIENT
Start: 2021-04-09 | End: 2021-04-09 | Stop reason: HOSPADM

## 2021-04-09 RX ORDER — GUAIFENESIN 100 MG/5ML
LIQUID (ML) ORAL AS NEEDED
Status: DISCONTINUED | OUTPATIENT
Start: 2021-04-09 | End: 2021-04-09 | Stop reason: HOSPADM

## 2021-04-09 RX ORDER — ISOSORBIDE DINITRATE 10 MG/1
10 TABLET ORAL 3 TIMES DAILY
Status: DISCONTINUED | OUTPATIENT
Start: 2021-04-09 | End: 2021-04-10 | Stop reason: HOSPADM

## 2021-04-09 RX ORDER — SODIUM CHLORIDE 450 MG/100ML
60 INJECTION, SOLUTION INTRAVENOUS CONTINUOUS
Status: DISCONTINUED | OUTPATIENT
Start: 2021-04-09 | End: 2021-04-10 | Stop reason: HOSPADM

## 2021-04-09 RX ORDER — NITROGLYCERIN 5 MG/ML
INJECTION, SOLUTION INTRAVENOUS AS NEEDED
Status: DISCONTINUED | OUTPATIENT
Start: 2021-04-09 | End: 2021-04-09 | Stop reason: HOSPADM

## 2021-04-09 RX ORDER — LANOLIN ALCOHOL/MO/W.PET/CERES
400 CREAM (GRAM) TOPICAL DAILY
Status: DISCONTINUED | OUTPATIENT
Start: 2021-04-09 | End: 2021-04-09

## 2021-04-09 RX ORDER — GUAIFENESIN 100 MG/5ML
81 LIQUID (ML) ORAL DAILY
Status: DISCONTINUED | OUTPATIENT
Start: 2021-04-10 | End: 2021-04-10 | Stop reason: HOSPADM

## 2021-04-09 RX ORDER — HYDRALAZINE HYDROCHLORIDE 50 MG/1
50 TABLET, FILM COATED ORAL 3 TIMES DAILY
Status: DISCONTINUED | OUTPATIENT
Start: 2021-04-09 | End: 2021-04-10 | Stop reason: HOSPADM

## 2021-04-09 RX ORDER — SODIUM CHLORIDE 0.9 % (FLUSH) 0.9 %
5-40 SYRINGE (ML) INJECTION AS NEEDED
Status: DISCONTINUED | OUTPATIENT
Start: 2021-04-09 | End: 2021-04-10 | Stop reason: HOSPADM

## 2021-04-09 RX ORDER — MIDAZOLAM HYDROCHLORIDE 1 MG/ML
INJECTION INTRAMUSCULAR; INTRAVENOUS AS NEEDED
Status: DISCONTINUED | OUTPATIENT
Start: 2021-04-09 | End: 2021-04-09 | Stop reason: HOSPADM

## 2021-04-09 RX ORDER — HEPARIN SODIUM 1000 [USP'U]/ML
INJECTION, SOLUTION INTRAVENOUS; SUBCUTANEOUS AS NEEDED
Status: DISCONTINUED | OUTPATIENT
Start: 2021-04-09 | End: 2021-04-09 | Stop reason: HOSPADM

## 2021-04-09 RX ORDER — CLOPIDOGREL BISULFATE 75 MG/1
75 TABLET ORAL DAILY
Status: DISCONTINUED | OUTPATIENT
Start: 2021-04-10 | End: 2021-04-10 | Stop reason: HOSPADM

## 2021-04-09 RX ADMIN — HYDRALAZINE HYDROCHLORIDE 50 MG: 50 TABLET, FILM COATED ORAL at 20:42

## 2021-04-09 RX ADMIN — Medication 10 ML: at 20:57

## 2021-04-09 RX ADMIN — ISOSORBIDE DINITRATE 10 MG: 10 TABLET ORAL at 20:42

## 2021-04-09 NOTE — Clinical Note
Lesion: Located in the Mid LAD. Stent inserted. Multiple inflations used. First inflation pressure = 12 naresh; inflation time: 10 sec. Second inflation pressure: 18 naresh; inflation time: 10 sec.

## 2021-04-09 NOTE — Clinical Note
TRANSFER - OUT REPORT:     Verbal report given to: (at bedside). Report consisted of patient's Situation, Background, Assessment and   Recommendations(SBAR). Opportunity for questions and clarification was provided. Patient transported with a Registered Nurse. Patient transported to: PACU.

## 2021-04-10 VITALS
HEART RATE: 75 BPM | HEIGHT: 71 IN | SYSTOLIC BLOOD PRESSURE: 159 MMHG | DIASTOLIC BLOOD PRESSURE: 89 MMHG | TEMPERATURE: 98.7 F | OXYGEN SATURATION: 98 % | RESPIRATION RATE: 20 BRPM | WEIGHT: 174 LBS | BODY MASS INDEX: 24.36 KG/M2

## 2021-04-10 LAB
ALBUMIN SERPL-MCNC: 2.7 G/DL (ref 3.5–5)
ALBUMIN/GLOB SERPL: 0.7 {RATIO} (ref 1.1–2.2)
ALP SERPL-CCNC: 77 U/L (ref 45–117)
ALT SERPL-CCNC: 15 U/L (ref 12–78)
ANION GAP SERPL CALC-SCNC: 7 MMOL/L (ref 5–15)
AST SERPL W P-5'-P-CCNC: 10 U/L (ref 15–37)
BILIRUB SERPL-MCNC: 0.3 MG/DL (ref 0.2–1)
BUN SERPL-MCNC: 44 MG/DL (ref 6–20)
BUN/CREAT SERPL: 22 (ref 12–20)
CA-I BLD-MCNC: 8.9 MG/DL (ref 8.5–10.1)
CHLORIDE SERPL-SCNC: 105 MMOL/L (ref 97–108)
CO2 SERPL-SCNC: 24 MMOL/L (ref 21–32)
CREAT SERPL-MCNC: 2.04 MG/DL (ref 0.7–1.3)
GLOBULIN SER CALC-MCNC: 4 G/DL (ref 2–4)
GLUCOSE BLD STRIP.AUTO-MCNC: 156 MG/DL (ref 65–100)
GLUCOSE SERPL-MCNC: 153 MG/DL (ref 65–100)
PERFORMED BY, TECHID: ABNORMAL
POTASSIUM SERPL-SCNC: 3.8 MMOL/L (ref 3.5–5.1)
PROT SERPL-MCNC: 6.7 G/DL (ref 6.4–8.2)
SODIUM SERPL-SCNC: 136 MMOL/L (ref 136–145)

## 2021-04-10 PROCEDURE — 74011000250 HC RX REV CODE- 250: Performed by: INTERNAL MEDICINE

## 2021-04-10 PROCEDURE — 82962 GLUCOSE BLOOD TEST: CPT

## 2021-04-10 PROCEDURE — 80053 COMPREHEN METABOLIC PANEL: CPT

## 2021-04-10 PROCEDURE — 74011250637 HC RX REV CODE- 250/637: Performed by: INTERNAL MEDICINE

## 2021-04-10 PROCEDURE — 36415 COLL VENOUS BLD VENIPUNCTURE: CPT

## 2021-04-10 RX ORDER — AMLODIPINE BESYLATE 5 MG/1
5 TABLET ORAL DAILY
Qty: 90 TAB | Refills: 3 | Status: SHIPPED | OUTPATIENT
Start: 2021-04-11 | End: 2021-04-10

## 2021-04-10 RX ORDER — CLOPIDOGREL BISULFATE 75 MG/1
75 TABLET ORAL DAILY
Qty: 90 TAB | Refills: 3 | Status: SHIPPED | OUTPATIENT
Start: 2021-04-11 | End: 2021-04-10

## 2021-04-10 RX ORDER — CLOPIDOGREL BISULFATE 75 MG/1
75 TABLET ORAL DAILY
Qty: 90 TAB | Refills: 3 | Status: SHIPPED | OUTPATIENT
Start: 2021-04-11

## 2021-04-10 RX ORDER — AMLODIPINE BESYLATE 5 MG/1
5 TABLET ORAL DAILY
Qty: 90 TAB | Refills: 3 | Status: SHIPPED | OUTPATIENT
Start: 2021-04-11

## 2021-04-10 RX ORDER — GUAIFENESIN 100 MG/5ML
81 LIQUID (ML) ORAL DAILY
Qty: 90 TAB | Refills: 3 | Status: SHIPPED | OUTPATIENT
Start: 2021-04-11

## 2021-04-10 RX ORDER — GUAIFENESIN 100 MG/5ML
81 LIQUID (ML) ORAL DAILY
Qty: 90 TAB | Refills: 3 | Status: SHIPPED | OUTPATIENT
Start: 2021-04-11 | End: 2021-04-10

## 2021-04-10 RX ADMIN — Medication 10 ML: at 06:52

## 2021-04-10 RX ADMIN — MAGNESIUM OXIDE TAB 400 MG (241.3 MG ELEMENTAL MG) 400 MG: 400 (241.3 MG) TAB at 09:37

## 2021-04-10 RX ADMIN — ASPIRIN 81 MG: 81 TABLET, CHEWABLE ORAL at 09:38

## 2021-04-10 RX ADMIN — ISOSORBIDE DINITRATE 10 MG: 10 TABLET ORAL at 09:38

## 2021-04-10 RX ADMIN — CARVEDILOL 12.5 MG: 12.5 TABLET, FILM COATED ORAL at 09:38

## 2021-04-10 RX ADMIN — GLIPIZIDE 10 MG: 5 TABLET ORAL at 09:38

## 2021-04-10 RX ADMIN — CLOPIDOGREL BISULFATE 75 MG: 75 TABLET ORAL at 09:38

## 2021-04-10 RX ADMIN — SODIUM CHLORIDE 100 ML/HR: 4.5 INJECTION, SOLUTION INTRAVENOUS at 03:19

## 2021-04-10 RX ADMIN — ALLOPURINOL 300 MG: 300 TABLET ORAL at 09:38

## 2021-04-10 RX ADMIN — HYDRALAZINE HYDROCHLORIDE 50 MG: 50 TABLET, FILM COATED ORAL at 09:37

## 2021-04-10 RX ADMIN — AMLODIPINE BESYLATE 5 MG: 5 TABLET ORAL at 09:38

## 2021-04-10 RX ADMIN — FUROSEMIDE 40 MG: 40 TABLET ORAL at 09:37

## 2021-04-10 RX ADMIN — AMIODARONE HYDROCHLORIDE 200 MG: 200 TABLET ORAL at 09:38

## 2021-04-10 NOTE — ROUTINE PROCESS
I have reviewed discharge instructions with the patient and spouse. The patient and spouse verbalized understanding, stent card given and post cath instructions given. Telemetry removed and returned. Patient transported to entrance via wheelchair, discharged home with wife.

## 2021-04-10 NOTE — PROGRESS NOTES
Patient discharged home prior to  meeting to provide Medicare 2nd IM letter. HUSSEIN Bhakta                Patient scheduled for discharge home today. Aware to follow up w/PCP and Cardiology for this inpatient admission. Requires Medicare 2nd IM letter prior to discharge.            HUSSEIN Bhakta

## 2021-04-10 NOTE — DISCHARGE SUMMARY
Cardiology Discharge Summary     Patient ID: Guru Lang  [id number]  3 y.o.  1950    Admit Date: 4/9/2021  Discharge Date: 4/10/2021   Admitting Physician: Clydia Fleischer, MD   Discharge Physician: Elier Magallon MD    Admission Diagnoses: Cardiomyopathy, unspecified type ([address] [I42.9]  Cardiomyopathy [facility]) [I42.9]    Discharge Diagnoses: Active Problems:    Cardiomyopathy [facility]) (4/9/2021)        [facility] 4/9/2021:    Procedure : Selective coronary angiogram of the left and the right system. IFR estimation of the LAD. Primary stenting of the mid LAD.        Patent left main, mid LAD with 75% disease, IFR of 0.84. First diagonal branch with lower subdivision moderate caliber vessel with a 95% disease proximally. Dominant and patent circumflex with irregularities. Nondominant and small and diffusely diseased right coronary artery. Successful primary stenting of the mid LAD using a 3.5 by12 millimeter drug-eluting greg stent to a size of 3.8 mm diameter with a less than 10% residual stenosis and a stable vessel.     Continue IV hydration with half-normal saline. Dual antiplatelet therapy    Hospital Course: 23-year-old man with a dilated cardiomyopathy and advanced kidney disease who had a echocardiogram done 3/21/21 that showed EF of about 25% and was admitted for elective cardiac catheterization to exclude high-grade CAD. [facility] showed a tight lesion in the mid LAD for which he had successful PCI with ROXANE 4/9/2021. He was kept overnight and hydrated because of his advanced kidney disease; creatinine went from 2.11--->2.04 on the day of discharge and so there was no issue with contrast-induced nephropathy. Patient is to follow-up with me in the office in 2 to 4 weeks. · LV: Calculated LVEF is 25%. Normal cavity size. Moderate concentric hypertrophy. Severely reduced systolic function. Left ventricular diastolic dysfunction due to impaired relaxation. · LA: Dilated left atrium.   · MV: Mitral valve non-specific thickening and thickening. Mild mitral valve regurgitation is present. · TV: Mild tricuspid valve regurgitation is present. Recent Labs     04/10/21  0632 04/09/21  1325    137   K 3.8 4.3   BUN 44* 47*   CREA 2.04* 2.11*   WBC  --  7.0   HGB  --  9.6*   HCT  --  31.3*   PLT  --  304     No results found for: CHOL, CHOLX, CHLST, CHOLV, HDL, HDLP, LDL, LDLC, DLDLP, TGLX, TRIGL, TRIGP  Recent Labs     04/10/21  0632 04/09/21  1325   ALT 15 15   AP 77 82     Recent Labs     04/09/21  1325   INR 1.2*   PTP 15.0*   APTT 44.5*        Patient was ambulating without symptoms prior to d/c. Consults: None  Disposition: Home  Discharge Condition: Asymptomatic  Patient Instructions: Plavix 75 mg QD added to aspirin 81mg QD  therapy  Current Discharge Medication List      CONTINUE these medications which have NOT CHANGED    Details   carvediloL (COREG) 12.5 mg tablet Take 1 Tab by mouth two (2) times daily (with meals) for 30 days. Qty: 60 Tab, Refills: 1      furosemide (LASIX) 40 mg tablet Take 1.5 Tabs by mouth daily for 30 days. Qty: 90 Tab, Refills: 1      hydrALAZINE (APRESOLINE) 50 mg tablet Take 1 Tab by mouth three (3) times daily for 30 days. Qty: 90 Tab, Refills: 1      isosorbide dinitrate (ISORDIL) 10 mg tablet Take 1 Tab by mouth three (3) times daily for 30 days. Qty: 90 Tab, Refills: 1      amiodarone (CORDARONE) 200 mg tablet Take 1 Tab by mouth daily for 30 days. Qty: 30 Tab, Refills: 0      allopurinoL (ZYLOPRIM) 300 mg tablet Take  by mouth daily. glipiZIDE SR (GLUCOTROL XL) 10 mg CR tablet Take 20 mg by mouth daily. Referenced discharge instructions provided by nursing for diet and activity.   Follow-up: Cardiology appointment already scheduled in 2 to 3 weeks  Follow-up Information    None           > 30 minutes coordinating discharge  Yes     Signed:  Nimco Gardner MD  4/10/2021  11:33 AM

## 2021-04-11 NOTE — PROGRESS NOTES
Patient was given printed teaching materials by the cath lab staff after procedure explaining the importance of medication compliance. Patient was also given my contact information and encouraged to contact me if he has any questions or needs further assistance. Patient was given information regarding enrollment into phase 2 outpatient cardiac rehab by cardiac cath lab staff after the procedure, including printed teaching materials about cardiac rehab and my contact information. Patient made aware that he will be contacted to schedule outpatient cardiac rehab at the facility of his choice. 4/12/2021: Patient was mailed information regarding enrollment into phase 2 outpatient cardiac rehab at [facility], which is the facility that is nearest to where the patient lives. Patient's information was forwarded to this facility. Patient will be contacted by this facility to schedule an initial appointment. Patient was mailed printed teaching materials, my contact information in case he needs further assistance, and the address and phone number for the cardiac rehab facility to which he has been referred.

## 2021-04-20 ENCOUNTER — HOSPITAL ENCOUNTER (OUTPATIENT)
Dept: CARDIAC REHAB | Age: 71
Discharge: HOME | End: 2021-04-20
Payer: MEDICARE

## 2021-04-20 VITALS — HEIGHT: 71 IN | BODY MASS INDEX: 26.88 KG/M2 | WEIGHT: 192 LBS

## 2021-04-20 PROCEDURE — 93798 PHYS/QHP OP CAR RHAB W/ECG: CPT

## 2021-04-20 NOTE — CARDIO/PULMONARY
Jarred Ortiz 80 y.o. 
 
presented to cardiac rehab for orientation and exercise tolerance test today with a primary diagnosis of PTCA w/ stent placement to LAD. Jarred Ortiz has a history of HF, cardiomyopathy, CAP, anemia, CKD, DM, HTN, sepsis. Cardiac risk factors include HF, DM, HTN. Jarred Ortiz is  and lives with his wife Kathi Irwin. social hx. PHQ9, depression score, is 9 and this is considered mild. The result was discussed with patient who affirms score to be accurate and results faxed to referring MD, Elvira Rockwell. Patient denied chest pain or SOB during 6 minute walk test and was in NSR w/ PVC's. Exercise prescription developed around patient stated goals, to be supplemented with home exercise recommendations. EF is 25%. Education manual given to patient and reviewed. Patient will attend cardiac rehab 2-3 times/week and education

## 2021-04-21 ENCOUNTER — APPOINTMENT (OUTPATIENT)
Dept: CARDIAC REHAB | Age: 71
End: 2021-04-21
Payer: MEDICARE

## 2021-04-23 ENCOUNTER — APPOINTMENT (OUTPATIENT)
Dept: CARDIAC REHAB | Age: 71
End: 2021-04-23
Payer: MEDICARE

## 2021-04-26 ENCOUNTER — APPOINTMENT (OUTPATIENT)
Dept: CARDIAC REHAB | Age: 71
End: 2021-04-26
Payer: MEDICARE

## 2021-04-28 ENCOUNTER — APPOINTMENT (OUTPATIENT)
Dept: CARDIAC REHAB | Age: 71
End: 2021-04-28
Payer: MEDICARE

## 2021-04-30 ENCOUNTER — APPOINTMENT (OUTPATIENT)
Dept: CARDIAC REHAB | Age: 71
End: 2021-04-30
Payer: MEDICARE

## 2021-05-03 ENCOUNTER — APPOINTMENT (OUTPATIENT)
Dept: CARDIAC REHAB | Age: 71
End: 2021-05-03
Payer: MEDICARE

## 2021-05-05 ENCOUNTER — APPOINTMENT (OUTPATIENT)
Dept: CARDIAC REHAB | Age: 71
End: 2021-05-05
Payer: MEDICARE

## 2021-05-07 ENCOUNTER — HOSPITAL ENCOUNTER (OUTPATIENT)
Dept: CARDIAC REHAB | Age: 71
Discharge: HOME | End: 2021-05-07
Payer: MEDICARE

## 2021-05-07 VITALS — WEIGHT: 190.8 LBS | BODY MASS INDEX: 26.61 KG/M2

## 2021-05-07 PROCEDURE — 93798 PHYS/QHP OP CAR RHAB W/ECG: CPT

## 2021-05-10 ENCOUNTER — HOSPITAL ENCOUNTER (OUTPATIENT)
Dept: CARDIAC REHAB | Age: 71
Discharge: HOME | End: 2021-05-10
Payer: MEDICARE

## 2021-05-10 VITALS — WEIGHT: 190.8 LBS | BODY MASS INDEX: 26.61 KG/M2

## 2021-05-10 PROCEDURE — 93798 PHYS/QHP OP CAR RHAB W/ECG: CPT

## 2021-05-12 ENCOUNTER — HOSPITAL ENCOUNTER (OUTPATIENT)
Dept: CARDIAC REHAB | Age: 71
Discharge: HOME | End: 2021-05-12
Payer: MEDICARE

## 2021-05-12 VITALS — WEIGHT: 192.2 LBS | BODY MASS INDEX: 26.81 KG/M2

## 2021-05-12 PROCEDURE — 93797 PHYS/QHP OP CAR RHAB WO ECG: CPT

## 2021-05-12 PROCEDURE — 93798 PHYS/QHP OP CAR RHAB W/ECG: CPT

## 2021-05-14 ENCOUNTER — HOSPITAL ENCOUNTER (OUTPATIENT)
Dept: CARDIAC REHAB | Age: 71
Discharge: HOME | End: 2021-05-14
Payer: MEDICARE

## 2021-05-14 VITALS — BODY MASS INDEX: 26.97 KG/M2 | WEIGHT: 193.4 LBS

## 2021-05-14 PROCEDURE — 93798 PHYS/QHP OP CAR RHAB W/ECG: CPT

## 2021-05-17 ENCOUNTER — HOSPITAL ENCOUNTER (OUTPATIENT)
Dept: CARDIAC REHAB | Age: 71
Discharge: HOME | End: 2021-05-17
Payer: MEDICARE

## 2021-05-17 VITALS — BODY MASS INDEX: 26.97 KG/M2 | WEIGHT: 193.4 LBS

## 2021-05-17 PROCEDURE — 93798 PHYS/QHP OP CAR RHAB W/ECG: CPT

## 2021-05-19 ENCOUNTER — HOSPITAL ENCOUNTER (OUTPATIENT)
Dept: CARDIAC REHAB | Age: 71
Discharge: HOME | End: 2021-05-19
Payer: MEDICARE

## 2021-05-19 VITALS — BODY MASS INDEX: 26.97 KG/M2 | WEIGHT: 193.4 LBS

## 2021-05-19 PROCEDURE — 93798 PHYS/QHP OP CAR RHAB W/ECG: CPT

## 2021-05-19 PROCEDURE — 93797 PHYS/QHP OP CAR RHAB WO ECG: CPT

## 2021-05-21 ENCOUNTER — HOSPITAL ENCOUNTER (OUTPATIENT)
Dept: CARDIAC REHAB | Age: 71
Discharge: HOME | End: 2021-05-21
Payer: MEDICARE

## 2021-05-21 VITALS — WEIGHT: 191.4 LBS | BODY MASS INDEX: 26.69 KG/M2

## 2021-05-21 PROCEDURE — 93798 PHYS/QHP OP CAR RHAB W/ECG: CPT

## 2021-05-24 ENCOUNTER — HOSPITAL ENCOUNTER (OUTPATIENT)
Dept: CARDIAC REHAB | Age: 71
Discharge: HOME | End: 2021-05-24
Payer: MEDICARE

## 2021-05-24 VITALS — BODY MASS INDEX: 26.92 KG/M2 | WEIGHT: 193 LBS

## 2021-05-24 PROCEDURE — 93798 PHYS/QHP OP CAR RHAB W/ECG: CPT

## 2021-05-26 ENCOUNTER — HOSPITAL ENCOUNTER (OUTPATIENT)
Dept: CARDIAC REHAB | Age: 71
Discharge: HOME | End: 2021-05-26
Payer: MEDICARE

## 2021-05-26 VITALS — BODY MASS INDEX: 26.78 KG/M2 | WEIGHT: 192 LBS

## 2021-05-26 PROCEDURE — 93797 PHYS/QHP OP CAR RHAB WO ECG: CPT

## 2021-05-26 PROCEDURE — 93798 PHYS/QHP OP CAR RHAB W/ECG: CPT

## 2021-05-28 ENCOUNTER — HOSPITAL ENCOUNTER (OUTPATIENT)
Dept: CARDIAC REHAB | Age: 71
Discharge: HOME | End: 2021-05-28
Payer: MEDICARE

## 2021-05-28 VITALS — BODY MASS INDEX: 27.06 KG/M2 | WEIGHT: 194 LBS

## 2021-05-28 PROCEDURE — 93798 PHYS/QHP OP CAR RHAB W/ECG: CPT

## 2021-05-28 NOTE — CARDIO/PULMONARY
Patient had a 5 beat run of wide-complex arrythmia while resting in chair after walking on treadmill for BP check. Asymptomatic. Session report faxed to Dr. Barbara Pavon, the referring provider.

## 2021-06-04 ENCOUNTER — HOSPITAL ENCOUNTER (OUTPATIENT)
Dept: CARDIAC REHAB | Age: 71
Discharge: HOME | End: 2021-06-04
Payer: MEDICARE

## 2021-06-04 VITALS — BODY MASS INDEX: 27.06 KG/M2 | WEIGHT: 194 LBS

## 2021-06-04 PROCEDURE — 93798 PHYS/QHP OP CAR RHAB W/ECG: CPT

## 2021-06-07 ENCOUNTER — HOSPITAL ENCOUNTER (OUTPATIENT)
Dept: CARDIAC REHAB | Age: 71
Discharge: HOME | End: 2021-06-07
Payer: MEDICARE

## 2021-06-07 VITALS — BODY MASS INDEX: 27.06 KG/M2 | WEIGHT: 194 LBS

## 2021-06-07 PROCEDURE — 93798 PHYS/QHP OP CAR RHAB W/ECG: CPT

## 2021-06-09 ENCOUNTER — HOSPITAL ENCOUNTER (OUTPATIENT)
Dept: CARDIAC REHAB | Age: 71
Discharge: HOME | End: 2021-06-09
Payer: MEDICARE

## 2021-06-09 VITALS — BODY MASS INDEX: 26.44 KG/M2 | WEIGHT: 189.6 LBS

## 2021-06-09 PROCEDURE — 93797 PHYS/QHP OP CAR RHAB WO ECG: CPT

## 2021-06-09 PROCEDURE — 93798 PHYS/QHP OP CAR RHAB W/ECG: CPT

## 2021-06-11 ENCOUNTER — HOSPITAL ENCOUNTER (OUTPATIENT)
Dept: CARDIAC REHAB | Age: 71
Discharge: HOME | End: 2021-06-11
Payer: MEDICARE

## 2021-06-11 VITALS — BODY MASS INDEX: 26.78 KG/M2 | WEIGHT: 192 LBS

## 2021-06-11 PROCEDURE — 93798 PHYS/QHP OP CAR RHAB W/ECG: CPT

## 2021-06-14 ENCOUNTER — HOSPITAL ENCOUNTER (OUTPATIENT)
Dept: CARDIAC REHAB | Age: 71
Discharge: HOME | End: 2021-06-14
Payer: MEDICARE

## 2021-06-14 VITALS — BODY MASS INDEX: 26.78 KG/M2 | WEIGHT: 192 LBS

## 2021-06-14 PROCEDURE — 93798 PHYS/QHP OP CAR RHAB W/ECG: CPT

## 2021-06-16 ENCOUNTER — HOSPITAL ENCOUNTER (OUTPATIENT)
Dept: CARDIAC REHAB | Age: 71
Discharge: HOME | End: 2021-06-16
Payer: MEDICARE

## 2021-06-16 ENCOUNTER — APPOINTMENT (OUTPATIENT)
Dept: CARDIAC REHAB | Age: 71
End: 2021-06-16
Payer: MEDICARE

## 2021-06-16 VITALS — WEIGHT: 193.4 LBS | BODY MASS INDEX: 26.97 KG/M2

## 2021-06-16 PROCEDURE — 93798 PHYS/QHP OP CAR RHAB W/ECG: CPT

## 2021-06-18 ENCOUNTER — HOSPITAL ENCOUNTER (OUTPATIENT)
Dept: CARDIAC REHAB | Age: 71
Discharge: HOME | End: 2021-06-18
Payer: MEDICARE

## 2021-06-18 VITALS — WEIGHT: 192 LBS | BODY MASS INDEX: 26.78 KG/M2

## 2021-06-18 PROCEDURE — 93798 PHYS/QHP OP CAR RHAB W/ECG: CPT

## 2021-06-21 ENCOUNTER — APPOINTMENT (OUTPATIENT)
Dept: CARDIAC REHAB | Age: 71
End: 2021-06-21
Payer: MEDICARE

## 2021-06-23 ENCOUNTER — HOSPITAL ENCOUNTER (OUTPATIENT)
Dept: CARDIAC REHAB | Age: 71
Discharge: HOME | End: 2021-06-23
Payer: MEDICARE

## 2021-06-23 VITALS — BODY MASS INDEX: 26.78 KG/M2 | WEIGHT: 192 LBS

## 2021-06-23 PROCEDURE — 93797 PHYS/QHP OP CAR RHAB WO ECG: CPT

## 2021-06-23 PROCEDURE — 93798 PHYS/QHP OP CAR RHAB W/ECG: CPT

## 2021-06-25 ENCOUNTER — HOSPITAL ENCOUNTER (OUTPATIENT)
Dept: CARDIAC REHAB | Age: 71
Discharge: HOME | End: 2021-06-25
Payer: MEDICARE

## 2021-06-25 VITALS — BODY MASS INDEX: 26.64 KG/M2 | WEIGHT: 191 LBS

## 2021-06-25 PROCEDURE — 93798 PHYS/QHP OP CAR RHAB W/ECG: CPT

## 2021-06-28 ENCOUNTER — HOSPITAL ENCOUNTER (OUTPATIENT)
Dept: CARDIAC REHAB | Age: 71
Discharge: HOME | End: 2021-06-28
Payer: MEDICARE

## 2021-06-28 VITALS — BODY MASS INDEX: 26.64 KG/M2 | WEIGHT: 191 LBS

## 2021-06-28 PROCEDURE — 93798 PHYS/QHP OP CAR RHAB W/ECG: CPT

## 2021-06-30 ENCOUNTER — HOSPITAL ENCOUNTER (OUTPATIENT)
Dept: CARDIAC REHAB | Age: 71
Discharge: HOME | End: 2021-06-30
Payer: MEDICARE

## 2021-06-30 VITALS — BODY MASS INDEX: 26.92 KG/M2 | WEIGHT: 193 LBS

## 2021-06-30 PROCEDURE — 93797 PHYS/QHP OP CAR RHAB WO ECG: CPT

## 2021-06-30 PROCEDURE — 93798 PHYS/QHP OP CAR RHAB W/ECG: CPT

## 2021-07-05 ENCOUNTER — APPOINTMENT (OUTPATIENT)
Dept: CARDIAC REHAB | Age: 71
End: 2021-07-05
Payer: MEDICARE

## 2021-07-07 ENCOUNTER — HOSPITAL ENCOUNTER (OUTPATIENT)
Dept: CARDIAC REHAB | Age: 71
Discharge: HOME | End: 2021-07-07
Payer: MEDICARE

## 2021-07-07 VITALS — WEIGHT: 188 LBS | BODY MASS INDEX: 26.22 KG/M2

## 2021-07-07 PROCEDURE — 93797 PHYS/QHP OP CAR RHAB WO ECG: CPT

## 2021-07-07 PROCEDURE — 93798 PHYS/QHP OP CAR RHAB W/ECG: CPT

## 2021-07-09 ENCOUNTER — HOSPITAL ENCOUNTER (OUTPATIENT)
Dept: CARDIAC REHAB | Age: 71
Discharge: HOME | End: 2021-07-09
Payer: MEDICARE

## 2021-07-09 VITALS — BODY MASS INDEX: 26.53 KG/M2 | WEIGHT: 190.2 LBS

## 2021-07-09 PROCEDURE — 93798 PHYS/QHP OP CAR RHAB W/ECG: CPT

## 2021-07-12 ENCOUNTER — HOSPITAL ENCOUNTER (OUTPATIENT)
Dept: CARDIAC REHAB | Age: 71
Discharge: HOME | End: 2021-07-12
Payer: MEDICARE

## 2021-07-12 VITALS — WEIGHT: 191.2 LBS | BODY MASS INDEX: 26.67 KG/M2

## 2021-07-12 PROCEDURE — 93798 PHYS/QHP OP CAR RHAB W/ECG: CPT

## 2021-07-14 ENCOUNTER — HOSPITAL ENCOUNTER (OUTPATIENT)
Dept: CARDIAC REHAB | Age: 71
Discharge: HOME | End: 2021-07-14
Payer: MEDICARE

## 2021-07-14 VITALS — WEIGHT: 192 LBS | BODY MASS INDEX: 26.78 KG/M2

## 2021-07-14 PROCEDURE — 93798 PHYS/QHP OP CAR RHAB W/ECG: CPT

## 2021-07-14 PROCEDURE — 93797 PHYS/QHP OP CAR RHAB WO ECG: CPT

## 2021-07-16 ENCOUNTER — HOSPITAL ENCOUNTER (OUTPATIENT)
Dept: CARDIAC REHAB | Age: 71
Discharge: HOME | End: 2021-07-16
Payer: MEDICARE

## 2021-07-16 VITALS — BODY MASS INDEX: 26.78 KG/M2 | WEIGHT: 192 LBS

## 2021-07-16 PROCEDURE — 93798 PHYS/QHP OP CAR RHAB W/ECG: CPT

## 2021-07-19 ENCOUNTER — HOSPITAL ENCOUNTER (OUTPATIENT)
Dept: CARDIAC REHAB | Age: 71
Discharge: HOME | End: 2021-07-19
Payer: MEDICARE

## 2021-07-19 ENCOUNTER — APPOINTMENT (OUTPATIENT)
Dept: CARDIAC REHAB | Age: 71
End: 2021-07-19
Payer: MEDICARE

## 2021-07-19 VITALS — WEIGHT: 191 LBS | BODY MASS INDEX: 26.64 KG/M2

## 2021-07-19 PROCEDURE — 93798 PHYS/QHP OP CAR RHAB W/ECG: CPT

## 2021-07-19 NOTE — CARDIO/PULMONARY
Isi Sandoval  Completed phase II cardiac rehab and attended 36 sessions from 4/20/21. Isi Sandoval is interested in maintaining optimal health and will work with Dr. Nini Vera MD. Isi Sandoval has improved his endurance and stamina through regular exercise, lost 1 lb and 1.5 inches from his waist. Blood pressure is 148/74 and is not WNL. He has also improved his nutrition, [employer] and depression scores and these were reviewed with patient. Isi Sandoval plans to continue exercising at home (walking) and is considering joining the [facility]. He has set revised goals that include keep walking and eating out less.   Albert Murdock RN  7/19/2021

## 2021-07-21 ENCOUNTER — APPOINTMENT (OUTPATIENT)
Dept: CARDIAC REHAB | Age: 71
End: 2021-07-21
Payer: MEDICARE

## 2021-07-28 ENCOUNTER — APPOINTMENT (OUTPATIENT)
Dept: CARDIAC REHAB | Age: 71
End: 2021-07-28
Payer: MEDICARE

## 2021-08-04 ENCOUNTER — APPOINTMENT (OUTPATIENT)
Dept: CARDIAC REHAB | Age: 71
End: 2021-08-04

## 2021-08-11 ENCOUNTER — APPOINTMENT (OUTPATIENT)
Dept: CARDIAC REHAB | Age: 71
End: 2021-08-11

## 2021-08-18 ENCOUNTER — APPOINTMENT (OUTPATIENT)
Dept: CARDIAC REHAB | Age: 71
End: 2021-08-18

## 2021-08-25 ENCOUNTER — APPOINTMENT (OUTPATIENT)
Dept: CARDIAC REHAB | Age: 71
End: 2021-08-25

## 2021-09-01 ENCOUNTER — APPOINTMENT (OUTPATIENT)
Dept: CARDIAC REHAB | Age: 71
End: 2021-09-01

## 2022-03-18 PROBLEM — J18.9 CAP (COMMUNITY ACQUIRED PNEUMONIA): Status: ACTIVE | Noted: 2021-03-19

## 2022-03-19 PROBLEM — I42.9 CARDIOMYOPATHY (HCC): Status: ACTIVE | Noted: 2021-04-09

## 2024-08-12 ENCOUNTER — APPOINTMENT (OUTPATIENT)
Facility: HOSPITAL | Age: 74
End: 2024-08-12
Attending: EMERGENCY MEDICINE
Payer: MEDICARE

## 2024-08-12 ENCOUNTER — HOSPITAL ENCOUNTER (EMERGENCY)
Facility: HOSPITAL | Age: 74
Discharge: HOME | End: 2024-08-12
Attending: EMERGENCY MEDICINE
Payer: MEDICARE

## 2024-08-12 VITALS
BODY MASS INDEX: 24.11 KG/M2 | RESPIRATION RATE: 18 BRPM | WEIGHT: 172.2 LBS | TEMPERATURE: 97.8 F | OXYGEN SATURATION: 97 % | HEART RATE: 67 BPM | DIASTOLIC BLOOD PRESSURE: 60 MMHG | SYSTOLIC BLOOD PRESSURE: 107 MMHG | HEIGHT: 71 IN

## 2024-08-12 DIAGNOSIS — J18.9 PNEUMONIA OF BOTH LUNGS DUE TO INFECTIOUS ORGANISM, UNSPECIFIED PART OF LUNG: ICD-10-CM

## 2024-08-12 DIAGNOSIS — R07.9 CHEST PAIN, UNSPECIFIED TYPE: Primary | ICD-10-CM

## 2024-08-12 LAB
ALBUMIN SERPL-MCNC: 3.3 G/DL (ref 3.5–5)
ALBUMIN/GLOB SERPL: 0.7 (ref 1.1–2.2)
ALP SERPL-CCNC: 76 U/L (ref 45–117)
ALT SERPL-CCNC: 21 U/L (ref 12–78)
ANION GAP SERPL CALC-SCNC: 14 MMOL/L (ref 5–15)
AST SERPL W P-5'-P-CCNC: 15 U/L (ref 15–37)
BASOPHILS # BLD: 0.1 K/UL (ref 0–0.1)
BASOPHILS NFR BLD: 1 % (ref 0–1)
BILIRUB SERPL-MCNC: 0.7 MG/DL (ref 0.2–1)
BNP SERPL-MCNC: ABNORMAL PG/ML
BUN SERPL-MCNC: 59 MG/DL (ref 6–20)
BUN/CREAT SERPL: 19 (ref 12–20)
CA-I BLD-MCNC: 9.7 MG/DL (ref 8.5–10.1)
CHLORIDE SERPL-SCNC: 105 MMOL/L (ref 97–108)
CO2 SERPL-SCNC: 19 MMOL/L (ref 21–32)
CREAT SERPL-MCNC: 3.12 MG/DL (ref 0.7–1.3)
DIFFERENTIAL METHOD BLD: ABNORMAL
EOSINOPHIL # BLD: 0.3 K/UL (ref 0–0.4)
EOSINOPHIL NFR BLD: 4 % (ref 0–7)
ERYTHROCYTE [DISTWIDTH] IN BLOOD BY AUTOMATED COUNT: 15.9 % (ref 11.5–14.5)
FLUAV RNA SPEC QL NAA+PROBE: NOT DETECTED
FLUBV RNA SPEC QL NAA+PROBE: NOT DETECTED
GLOBULIN SER CALC-MCNC: 4.9 G/DL (ref 2–4)
GLUCOSE SERPL-MCNC: 236 MG/DL (ref 65–100)
HCT VFR BLD AUTO: 31.2 % (ref 36.6–50.3)
HGB BLD-MCNC: 10 G/DL (ref 12.1–17)
IMM GRANULOCYTES # BLD AUTO: 0.1 K/UL (ref 0–0.04)
IMM GRANULOCYTES NFR BLD AUTO: 1 % (ref 0–0.5)
LYMPHOCYTES # BLD: 1.6 K/UL (ref 0.8–3.5)
LYMPHOCYTES NFR BLD: 27 % (ref 12–49)
MAGNESIUM SERPL-MCNC: 2.2 MG/DL (ref 1.6–2.4)
MCH RBC QN AUTO: 24.3 PG (ref 26–34)
MCHC RBC AUTO-ENTMCNC: 32.1 G/DL (ref 30–36.5)
MCV RBC AUTO: 75.7 FL (ref 80–99)
MONOCYTES # BLD: 0.7 K/UL (ref 0–1)
MONOCYTES NFR BLD: 13 % (ref 5–13)
NEUTS SEG # BLD: 3.3 K/UL (ref 1.8–8)
NEUTS SEG NFR BLD: 54 % (ref 32–75)
NRBC # BLD: 0 K/UL (ref 0–0.01)
NRBC BLD-RTO: 0 PER 100 WBC
PLATELET # BLD AUTO: 276 K/UL (ref 150–400)
PMV BLD AUTO: 11.2 FL (ref 8.9–12.9)
POTASSIUM SERPL-SCNC: 4.5 MMOL/L (ref 3.5–5.1)
PROT SERPL-MCNC: 8.2 G/DL (ref 6.4–8.2)
RBC # BLD AUTO: 4.12 M/UL (ref 4.1–5.7)
SARS-COV-2 RNA RESP QL NAA+PROBE: NOT DETECTED
SODIUM SERPL-SCNC: 138 MMOL/L (ref 136–145)
TROPONIN I SERPL HS-MCNC: 71 NG/L (ref 0–76)
TROPONIN I SERPL HS-MCNC: 72 NG/L (ref 0–76)
WBC # BLD AUTO: 5.9 K/UL (ref 4.1–11.1)

## 2024-08-12 PROCEDURE — 6370000000 HC RX 637 (ALT 250 FOR IP): Performed by: EMERGENCY MEDICINE

## 2024-08-12 PROCEDURE — 6360000002 HC RX W HCPCS: Performed by: EMERGENCY MEDICINE

## 2024-08-12 PROCEDURE — 85025 COMPLETE CBC W/AUTO DIFF WBC: CPT

## 2024-08-12 PROCEDURE — 96375 TX/PRO/DX INJ NEW DRUG ADDON: CPT

## 2024-08-12 PROCEDURE — 80053 COMPREHEN METABOLIC PANEL: CPT

## 2024-08-12 PROCEDURE — 36415 COLL VENOUS BLD VENIPUNCTURE: CPT

## 2024-08-12 PROCEDURE — 71045 X-RAY EXAM CHEST 1 VIEW: CPT

## 2024-08-12 PROCEDURE — 87040 BLOOD CULTURE FOR BACTERIA: CPT

## 2024-08-12 PROCEDURE — 96365 THER/PROPH/DIAG IV INF INIT: CPT

## 2024-08-12 PROCEDURE — 84484 ASSAY OF TROPONIN QUANT: CPT

## 2024-08-12 PROCEDURE — 83880 ASSAY OF NATRIURETIC PEPTIDE: CPT

## 2024-08-12 PROCEDURE — 87636 SARSCOV2 & INF A&B AMP PRB: CPT

## 2024-08-12 PROCEDURE — 99285 EMERGENCY DEPT VISIT HI MDM: CPT

## 2024-08-12 PROCEDURE — 2580000003 HC RX 258: Performed by: EMERGENCY MEDICINE

## 2024-08-12 PROCEDURE — 83735 ASSAY OF MAGNESIUM: CPT

## 2024-08-12 RX ORDER — ASPIRIN 81 MG/1
243 TABLET, CHEWABLE ORAL
Status: COMPLETED | OUTPATIENT
Start: 2024-08-12 | End: 2024-08-12

## 2024-08-12 RX ORDER — ISOSORBIDE MONONITRATE 120 MG/1
120 TABLET, EXTENDED RELEASE ORAL DAILY
COMMUNITY
Start: 2024-07-25

## 2024-08-12 RX ORDER — EZETIMIBE 10 MG/1
10 TABLET ORAL DAILY
COMMUNITY
Start: 2024-05-29 | End: 2024-11-25

## 2024-08-12 RX ORDER — FERROUS SULFATE 325(65) MG
325 TABLET ORAL
COMMUNITY

## 2024-08-12 RX ORDER — AZITHROMYCIN 500 MG/1
500 TABLET, FILM COATED ORAL DAILY
Qty: 5 TABLET | Refills: 0 | Status: SHIPPED | OUTPATIENT
Start: 2024-08-12 | End: 2024-08-17

## 2024-08-12 RX ORDER — GLIPIZIDE 10 MG/1
10 TABLET, FILM COATED, EXTENDED RELEASE ORAL DAILY
COMMUNITY
Start: 2024-05-29 | End: 2025-05-29

## 2024-08-12 RX ORDER — CARVEDILOL 12.5 MG/1
12.5 TABLET ORAL 2 TIMES DAILY
COMMUNITY
Start: 2024-03-05 | End: 2025-03-05

## 2024-08-12 RX ORDER — HYDRALAZINE HYDROCHLORIDE 50 MG/1
50 TABLET, FILM COATED ORAL 3 TIMES DAILY
COMMUNITY
Start: 2024-06-11 | End: 2025-06-12

## 2024-08-12 RX ORDER — ALLOPURINOL 300 MG/1
300 TABLET ORAL DAILY
COMMUNITY
Start: 2024-05-29 | End: 2025-05-29

## 2024-08-12 RX ORDER — BUTYROSPERMUM PARKII(SHEA BUTTER), SIMMONDSIA CHINENSIS (JOJOBA) SEED OIL, ALOE BARBADENSIS LEAF EXTRACT .01; 1; 3.5 G/100G; G/100G; G/100G
125 LIQUID TOPICAL DAILY
COMMUNITY

## 2024-08-12 RX ORDER — FUROSEMIDE 40 MG/1
40 TABLET ORAL DAILY
COMMUNITY
Start: 2023-12-14 | End: 2024-12-13

## 2024-08-12 RX ORDER — ALBUTEROL SULFATE 90 UG/1
2 AEROSOL, METERED RESPIRATORY (INHALATION) 4 TIMES DAILY PRN
Qty: 18 G | Refills: 0 | Status: SHIPPED | OUTPATIENT
Start: 2024-08-12

## 2024-08-12 RX ORDER — ASPIRIN 81 MG/1
TABLET, CHEWABLE ORAL
COMMUNITY
Start: 2021-04-11

## 2024-08-12 RX ORDER — CLOPIDOGREL BISULFATE 75 MG/1
75 TABLET ORAL DAILY
COMMUNITY
Start: 2021-04-11 | End: 2025-07-02

## 2024-08-12 RX ADMIN — ASPIRIN 243 MG: 81 TABLET, CHEWABLE ORAL at 19:04

## 2024-08-12 RX ADMIN — AZITHROMYCIN MONOHYDRATE 500 MG: 500 INJECTION, POWDER, LYOPHILIZED, FOR SOLUTION INTRAVENOUS at 20:08

## 2024-08-12 RX ADMIN — WATER 1000 MG: 1 INJECTION INTRAMUSCULAR; INTRAVENOUS; SUBCUTANEOUS at 20:08

## 2024-08-12 ASSESSMENT — PAIN DESCRIPTION - LOCATION: LOCATION: CHEST

## 2024-08-12 ASSESSMENT — PAIN SCALES - GENERAL: PAINLEVEL_OUTOF10: 8

## 2024-08-12 ASSESSMENT — PAIN DESCRIPTION - ORIENTATION: ORIENTATION: RIGHT

## 2024-08-12 NOTE — ED TRIAGE NOTES
Reports chest pain in middle of chest that radiates to right side with some shortness of breath, denies n/v, pain has been going on for a couple days

## 2024-08-12 NOTE — ED PROVIDER NOTES
Abdomen is soft.      Tenderness: There is no abdominal tenderness.   Musculoskeletal:         General: Normal range of motion.      Cervical back: Normal range of motion and neck supple.   Skin:     General: Skin is warm and dry.      Capillary Refill: Capillary refill takes less than 2 seconds.   Neurological:      General: No focal deficit present.      Mental Status: He is alert and oriented to person, place, and time.   Psychiatric:         Mood and Affect: Mood normal.         Behavior: Behavior normal.           SCREENINGS          History: 1  EC  Patient Age: 3  *Risk factors for Atherosclerotic disease: Hypertension; Diabetes Mellitus  Risk Factors: 1  Troponin: 0  Heart Score Total: 4       LAB, EKG AND DIAGNOSTIC RESULTS   Labs:  Recent Results (from the past 12 hour(s))   EKG 12 Lead    Collection Time: 24  6:13 PM   Result Value Ref Range    Ventricular Rate 71 BPM    Atrial Rate 67 BPM    P-R Interval 190 ms    QRS Duration 172 ms    Q-T Interval 516 ms    QTc Calculation (Bazett) 561 ms    P Axis 52 degrees    R Axis -8 degrees    T Axis 232 degrees    Diagnosis       Sinus rhythm  Multiple ventricular premature complexes  Left bundle branch block         EKG: EKG interpreted by me. Shows Normal Sinus Rhythm with a HR of 71.  No STEMI.    Radiologic Studies:  Non-plain film images such as CT, Ultrasound and MRI are read by the radiologist. Plain radiographic images are visualized and preliminarily interpreted by the ED Physician with the following findings: Xray Interpreted by me.  Shows patchy airspace opacities    Interpretation per the Radiologist below, if available at the time of this note:  XR CHEST 1 VIEW    (Results Pending)        ED COURSE and DIFFERENTIAL DIAGNOSIS/MDM   CC/HPI Summary, DDx, ED Course, and Reassessment: 74-year-old male presents to complaint of left-sided centralized chest pain started yesterday, patient describes the pain as achy pain intensity 5/10, patient pain

## 2024-08-13 LAB
EKG ATRIAL RATE: 67 BPM
EKG DIAGNOSIS: NORMAL
EKG P AXIS: 52 DEGREES
EKG P-R INTERVAL: 190 MS
EKG Q-T INTERVAL: 516 MS
EKG QRS DURATION: 172 MS
EKG QTC CALCULATION (BAZETT): 561 MS
EKG R AXIS: -8 DEGREES
EKG T AXIS: 232 DEGREES
EKG VENTRICULAR RATE: 71 BPM

## 2024-08-14 ASSESSMENT — HEART SCORE: ECG: NORMAL

## 2024-08-18 LAB
BACTERIA SPEC CULT: NORMAL
BACTERIA SPEC CULT: NORMAL
Lab: NORMAL
Lab: NORMAL

## 2024-11-07 ENCOUNTER — APPOINTMENT (OUTPATIENT)
Facility: HOSPITAL | Age: 74
End: 2024-11-07
Payer: MEDICARE

## 2024-11-07 ENCOUNTER — HOSPITAL ENCOUNTER (EMERGENCY)
Facility: HOSPITAL | Age: 74
Discharge: ANOTHER HOSPITAL | End: 2024-11-07
Attending: EMERGENCY MEDICINE
Payer: MEDICARE

## 2024-11-07 VITALS
TEMPERATURE: 98.5 F | OXYGEN SATURATION: 98 % | WEIGHT: 169 LBS | SYSTOLIC BLOOD PRESSURE: 139 MMHG | RESPIRATION RATE: 17 BRPM | DIASTOLIC BLOOD PRESSURE: 86 MMHG | HEIGHT: 71 IN | HEART RATE: 75 BPM | BODY MASS INDEX: 23.66 KG/M2

## 2024-11-07 DIAGNOSIS — I50.20 SYSTOLIC CONGESTIVE HEART FAILURE, UNSPECIFIED HF CHRONICITY (HCC): Primary | ICD-10-CM

## 2024-11-07 DIAGNOSIS — I21.4 NSTEMI (NON-ST ELEVATED MYOCARDIAL INFARCTION) (HCC): ICD-10-CM

## 2024-11-07 LAB
ALBUMIN SERPL-MCNC: 2.7 G/DL (ref 3.5–5)
ALBUMIN/GLOB SERPL: 0.7 (ref 1.1–2.2)
ALP SERPL-CCNC: 73 U/L (ref 45–117)
ALT SERPL-CCNC: 14 U/L (ref 12–78)
ANION GAP SERPL CALC-SCNC: 9 MMOL/L (ref 2–12)
APTT PPP: 40.3 SEC (ref 21.2–34.1)
AST SERPL W P-5'-P-CCNC: 10 U/L (ref 15–37)
BASOPHILS # BLD: 0 K/UL (ref 0–0.1)
BASOPHILS NFR BLD: 0 % (ref 0–1)
BILIRUB SERPL-MCNC: 0.9 MG/DL (ref 0.2–1)
BNP SERPL-MCNC: ABNORMAL PG/ML
BUN SERPL-MCNC: 43 MG/DL (ref 6–20)
BUN/CREAT SERPL: 22 (ref 12–20)
CA-I BLD-MCNC: 9.3 MG/DL (ref 8.5–10.1)
CHLORIDE SERPL-SCNC: 104 MMOL/L (ref 97–108)
CO2 SERPL-SCNC: 27 MMOL/L (ref 21–32)
CREAT SERPL-MCNC: 1.95 MG/DL (ref 0.7–1.3)
DIFFERENTIAL METHOD BLD: ABNORMAL
EOSINOPHIL # BLD: 0.1 K/UL (ref 0–0.4)
EOSINOPHIL NFR BLD: 1 % (ref 0–7)
ERYTHROCYTE [DISTWIDTH] IN BLOOD BY AUTOMATED COUNT: 15.8 % (ref 11.5–14.5)
GLOBULIN SER CALC-MCNC: 4 G/DL (ref 2–4)
GLUCOSE SERPL-MCNC: 174 MG/DL (ref 65–100)
HCT VFR BLD AUTO: 30.2 % (ref 36.6–50.3)
HGB BLD-MCNC: 9.7 G/DL (ref 12.1–17)
IMM GRANULOCYTES # BLD AUTO: 0 K/UL (ref 0–0.04)
IMM GRANULOCYTES NFR BLD AUTO: 0 % (ref 0–0.5)
INR PPP: 1.2 (ref 0.9–1.1)
LYMPHOCYTES # BLD: 1.4 K/UL (ref 0.8–3.5)
LYMPHOCYTES NFR BLD: 14 % (ref 12–49)
MCH RBC QN AUTO: 24.6 PG (ref 26–34)
MCHC RBC AUTO-ENTMCNC: 32.1 G/DL (ref 30–36.5)
MCV RBC AUTO: 76.6 FL (ref 80–99)
MONOCYTES # BLD: 0.6 K/UL (ref 0–1)
MONOCYTES NFR BLD: 6 % (ref 5–13)
NEUTS SEG # BLD: 8 K/UL (ref 1.8–8)
NEUTS SEG NFR BLD: 79 % (ref 32–75)
NRBC # BLD: 0 K/UL (ref 0–0.01)
NRBC BLD-RTO: 0 PER 100 WBC
PLATELET # BLD AUTO: 245 K/UL (ref 150–400)
PMV BLD AUTO: 10.5 FL (ref 8.9–12.9)
POTASSIUM SERPL-SCNC: 3.9 MMOL/L (ref 3.5–5.1)
PROT SERPL-MCNC: 6.7 G/DL (ref 6.4–8.2)
PROTHROMBIN TIME: 15.3 SEC (ref 11.9–14.6)
RBC # BLD AUTO: 3.94 M/UL (ref 4.1–5.7)
SODIUM SERPL-SCNC: 140 MMOL/L (ref 136–145)
THERAPEUTIC RANGE: ABNORMAL SEC (ref 82–109)
TROPONIN I SERPL HS-MCNC: 1609 NG/L (ref 0–76)
TROPONIN I SERPL HS-MCNC: 1655 NG/L (ref 0–76)
UFH PPP CHRO-ACNC: <0.1 IU/ML
WBC # BLD AUTO: 10.2 K/UL (ref 4.1–11.1)

## 2024-11-07 PROCEDURE — 6370000000 HC RX 637 (ALT 250 FOR IP): Performed by: EMERGENCY MEDICINE

## 2024-11-07 PROCEDURE — 85025 COMPLETE CBC W/AUTO DIFF WBC: CPT

## 2024-11-07 PROCEDURE — 96366 THER/PROPH/DIAG IV INF ADDON: CPT

## 2024-11-07 PROCEDURE — 85730 THROMBOPLASTIN TIME PARTIAL: CPT

## 2024-11-07 PROCEDURE — 93005 ELECTROCARDIOGRAM TRACING: CPT | Performed by: EMERGENCY MEDICINE

## 2024-11-07 PROCEDURE — 80053 COMPREHEN METABOLIC PANEL: CPT

## 2024-11-07 PROCEDURE — 94640 AIRWAY INHALATION TREATMENT: CPT

## 2024-11-07 PROCEDURE — 96375 TX/PRO/DX INJ NEW DRUG ADDON: CPT

## 2024-11-07 PROCEDURE — 6360000002 HC RX W HCPCS: Performed by: EMERGENCY MEDICINE

## 2024-11-07 PROCEDURE — 71045 X-RAY EXAM CHEST 1 VIEW: CPT

## 2024-11-07 PROCEDURE — 2580000003 HC RX 258: Performed by: EMERGENCY MEDICINE

## 2024-11-07 PROCEDURE — 85610 PROTHROMBIN TIME: CPT

## 2024-11-07 PROCEDURE — 85520 HEPARIN ASSAY: CPT

## 2024-11-07 PROCEDURE — 84484 ASSAY OF TROPONIN QUANT: CPT

## 2024-11-07 PROCEDURE — 83880 ASSAY OF NATRIURETIC PEPTIDE: CPT

## 2024-11-07 PROCEDURE — 99285 EMERGENCY DEPT VISIT HI MDM: CPT

## 2024-11-07 PROCEDURE — 96365 THER/PROPH/DIAG IV INF INIT: CPT

## 2024-11-07 RX ORDER — IPRATROPIUM BROMIDE AND ALBUTEROL SULFATE 2.5; .5 MG/3ML; MG/3ML
1 SOLUTION RESPIRATORY (INHALATION)
Status: COMPLETED | OUTPATIENT
Start: 2024-11-07 | End: 2024-11-07

## 2024-11-07 RX ORDER — HEPARIN SODIUM 1000 [USP'U]/ML
2000 INJECTION, SOLUTION INTRAVENOUS; SUBCUTANEOUS PRN
Status: DISCONTINUED | OUTPATIENT
Start: 2024-11-07 | End: 2024-11-08 | Stop reason: HOSPADM

## 2024-11-07 RX ORDER — HEPARIN SODIUM 1000 [USP'U]/ML
4000 INJECTION, SOLUTION INTRAVENOUS; SUBCUTANEOUS ONCE
Status: COMPLETED | OUTPATIENT
Start: 2024-11-07 | End: 2024-11-07

## 2024-11-07 RX ORDER — FUROSEMIDE 10 MG/ML
60 INJECTION INTRAMUSCULAR; INTRAVENOUS
Status: COMPLETED | OUTPATIENT
Start: 2024-11-07 | End: 2024-11-07

## 2024-11-07 RX ORDER — ASPIRIN 81 MG/1
324 TABLET, CHEWABLE ORAL
Status: COMPLETED | OUTPATIENT
Start: 2024-11-07 | End: 2024-11-07

## 2024-11-07 RX ORDER — HEPARIN SODIUM 10000 [USP'U]/100ML
5-30 INJECTION, SOLUTION INTRAVENOUS CONTINUOUS
Status: DISCONTINUED | OUTPATIENT
Start: 2024-11-07 | End: 2024-11-08 | Stop reason: HOSPADM

## 2024-11-07 RX ORDER — HEPARIN SODIUM 1000 [USP'U]/ML
4000 INJECTION, SOLUTION INTRAVENOUS; SUBCUTANEOUS PRN
Status: DISCONTINUED | OUTPATIENT
Start: 2024-11-07 | End: 2024-11-08 | Stop reason: HOSPADM

## 2024-11-07 RX ADMIN — IPRATROPIUM BROMIDE AND ALBUTEROL SULFATE 1 DOSE: .5; 3 SOLUTION RESPIRATORY (INHALATION) at 20:39

## 2024-11-07 RX ADMIN — FUROSEMIDE 60 MG: 10 INJECTION, SOLUTION INTRAMUSCULAR; INTRAVENOUS at 21:07

## 2024-11-07 RX ADMIN — HEPARIN SODIUM AND DEXTROSE 12 UNITS/KG/HR: 10000; 5 INJECTION INTRAVENOUS at 21:59

## 2024-11-07 RX ADMIN — CEFTRIAXONE SODIUM 1000 MG: 1 INJECTION, POWDER, FOR SOLUTION INTRAMUSCULAR; INTRAVENOUS at 21:08

## 2024-11-07 RX ADMIN — ASPIRIN 81 MG 324 MG: 81 TABLET ORAL at 21:07

## 2024-11-07 RX ADMIN — HEPARIN SODIUM 4000 UNITS: 1000 INJECTION INTRAVENOUS; SUBCUTANEOUS at 21:59

## 2024-11-07 ASSESSMENT — PAIN DESCRIPTION - LOCATION: LOCATION: CHEST

## 2024-11-07 ASSESSMENT — PAIN DESCRIPTION - PAIN TYPE: TYPE: ACUTE PAIN

## 2024-11-07 ASSESSMENT — PAIN DESCRIPTION - FREQUENCY: FREQUENCY: INTERMITTENT

## 2024-11-07 ASSESSMENT — PAIN - FUNCTIONAL ASSESSMENT: PAIN_FUNCTIONAL_ASSESSMENT: 0-10

## 2024-11-07 ASSESSMENT — PAIN SCALES - GENERAL: PAINLEVEL_OUTOF10: 3

## 2024-11-08 ENCOUNTER — HOSPITAL ENCOUNTER (INPATIENT)
Facility: HOSPITAL | Age: 74
LOS: 4 days | Discharge: HOME | DRG: 281 | End: 2024-11-12
Attending: FAMILY MEDICINE | Admitting: FAMILY MEDICINE
Payer: MEDICARE

## 2024-11-08 ENCOUNTER — APPOINTMENT (OUTPATIENT)
Facility: HOSPITAL | Age: 74
DRG: 281 | End: 2024-11-08
Attending: FAMILY MEDICINE
Payer: MEDICARE

## 2024-11-08 DIAGNOSIS — I50.23 ACUTE ON CHRONIC SYSTOLIC CONGESTIVE HEART FAILURE (HCC): Primary | ICD-10-CM

## 2024-11-08 DIAGNOSIS — I21.4 NSTEMI (NON-ST ELEVATED MYOCARDIAL INFARCTION) (HCC): ICD-10-CM

## 2024-11-08 PROBLEM — I50.9 ACUTE EXACERBATION OF CHRONIC HEART FAILURE (HCC): Status: ACTIVE | Noted: 2024-11-08

## 2024-11-08 LAB
ANION GAP SERPL CALC-SCNC: 7 MMOL/L (ref 2–12)
BASOPHILS # BLD: 0 K/UL (ref 0–0.1)
BASOPHILS NFR BLD: 0 % (ref 0–1)
BUN SERPL-MCNC: 45 MG/DL (ref 6–20)
BUN/CREAT SERPL: 23 (ref 12–20)
CA-I BLD-MCNC: 9.2 MG/DL (ref 8.5–10.1)
CHLORIDE SERPL-SCNC: 108 MMOL/L (ref 97–108)
CO2 SERPL-SCNC: 27 MMOL/L (ref 21–32)
CREAT SERPL-MCNC: 1.97 MG/DL (ref 0.7–1.3)
DIFFERENTIAL METHOD BLD: ABNORMAL
ECHO AO ASC DIAM: 3.7 CM
ECHO AO ROOT DIAM: 3.2 CM
ECHO AV AREA PEAK VELOCITY: 2.8 CM2
ECHO AV AREA VTI: 2.7 CM2
ECHO AV MEAN GRADIENT: 9 MMHG
ECHO AV MEAN VELOCITY: 1.5 M/S
ECHO AV PEAK GRADIENT: 15 MMHG
ECHO AV PEAK VELOCITY: 2 M/S
ECHO AV VELOCITY RATIO: 0.75
ECHO AV VTI: 38.1 CM
ECHO LA AREA 4C: 22.4 CM2
ECHO LA DIAMETER: 4.4 CM
ECHO LA MAJOR AXIS: 5.5 CM
ECHO LA TO AORTIC ROOT RATIO: 1.38
ECHO LA VOL MOD A4C: 76 ML (ref 18–58)
ECHO LV E' LATERAL VELOCITY: 7.6 CM/S
ECHO LV E' SEPTAL VELOCITY: 5.1 CM/S
ECHO LV EDV A4C: 285 ML
ECHO LV EF PHYSICIAN: 25 %
ECHO LV EJECTION FRACTION A4C: 21 %
ECHO LV ESV A4C: 226 ML
ECHO LV FRACTIONAL SHORTENING: 8 % (ref 28–44)
ECHO LV INTERNAL DIMENSION DIASTOLIC: 5.3 CM (ref 4.2–5.9)
ECHO LV INTERNAL DIMENSION SYSTOLIC: 4.9 CM
ECHO LV IVSD: 1.1 CM (ref 0.6–1)
ECHO LV MASS 2D: 335.5 G (ref 88–224)
ECHO LV POSTERIOR WALL DIASTOLIC: 1.8 CM (ref 0.6–1)
ECHO LV RELATIVE WALL THICKNESS RATIO: 0.68
ECHO LVOT AREA: 3.8 CM2
ECHO LVOT AV VTI INDEX: 0.71
ECHO LVOT DIAM: 2.2 CM
ECHO LVOT MEAN GRADIENT: 4 MMHG
ECHO LVOT PEAK GRADIENT: 8 MMHG
ECHO LVOT PEAK VELOCITY: 1.5 M/S
ECHO LVOT SV: 103 ML
ECHO LVOT VTI: 27.1 CM
ECHO MV A VELOCITY: 0.55 M/S
ECHO MV AREA VTI: 4.4 CM2
ECHO MV E DECELERATION TIME (DT): 177 MS
ECHO MV E VELOCITY: 0.94 M/S
ECHO MV E/A RATIO: 1.71
ECHO MV E/E' LATERAL: 12.37
ECHO MV E/E' RATIO (AVERAGED): 15.4
ECHO MV E/E' SEPTAL: 18.43
ECHO MV LVOT VTI INDEX: 0.87
ECHO MV MAX VELOCITY: 1.1 M/S
ECHO MV MEAN GRADIENT: 2 MMHG
ECHO MV MEAN VELOCITY: 0.6 M/S
ECHO MV PEAK GRADIENT: 5 MMHG
ECHO MV VTI: 23.6 CM
ECHO RA AREA 4C: 17.8 CM2
ECHO RA VOLUME: 47 ML
ECHO RV BASAL DIMENSION: 4.8 CM
ECHO RV FREE WALL PEAK S': 12.1 CM/S
ECHO RV TAPSE: 1.5 CM (ref 1.7–?)
ECHO TV REGURGITANT MAX VELOCITY: 3.38 M/S
ECHO TV REGURGITANT PEAK GRADIENT: 46 MMHG
EKG ATRIAL RATE: 74 BPM
EKG DIAGNOSIS: NORMAL
EKG P AXIS: 17 DEGREES
EKG P-R INTERVAL: 216 MS
EKG Q-T INTERVAL: 469 MS
EKG QRS DURATION: 133 MS
EKG QTC CALCULATION (BAZETT): 517 MS
EKG R AXIS: -41 DEGREES
EKG T AXIS: 148 DEGREES
EKG VENTRICULAR RATE: 73 BPM
EOSINOPHIL # BLD: 0.2 K/UL (ref 0–0.4)
EOSINOPHIL NFR BLD: 2 % (ref 0–7)
ERYTHROCYTE [DISTWIDTH] IN BLOOD BY AUTOMATED COUNT: 15.7 % (ref 11.5–14.5)
FERRITIN SERPL-MCNC: 410 NG/ML (ref 26–388)
GLUCOSE BLD STRIP.AUTO-MCNC: 128 MG/DL (ref 65–100)
GLUCOSE BLD STRIP.AUTO-MCNC: 143 MG/DL (ref 65–100)
GLUCOSE BLD STRIP.AUTO-MCNC: 144 MG/DL (ref 65–100)
GLUCOSE BLD STRIP.AUTO-MCNC: 174 MG/DL (ref 65–100)
GLUCOSE SERPL-MCNC: 201 MG/DL (ref 65–100)
HCT VFR BLD AUTO: 29.6 % (ref 36.6–50.3)
HGB BLD-MCNC: 9.4 G/DL (ref 12.1–17)
IMM GRANULOCYTES # BLD AUTO: 0 K/UL (ref 0–0.04)
IMM GRANULOCYTES NFR BLD AUTO: 0 % (ref 0–0.5)
INR PPP: 1.3 (ref 0.9–1.1)
IRON SATN MFR SERPL: 9 % (ref 20–50)
IRON SERPL-MCNC: 18 UG/DL (ref 35–150)
LYMPHOCYTES # BLD: 1.6 K/UL (ref 0.8–3.5)
LYMPHOCYTES NFR BLD: 19 % (ref 12–49)
MAGNESIUM SERPL-MCNC: 1.8 MG/DL (ref 1.6–2.4)
MCH RBC QN AUTO: 24.4 PG (ref 26–34)
MCHC RBC AUTO-ENTMCNC: 31.8 G/DL (ref 30–36.5)
MCV RBC AUTO: 76.9 FL (ref 80–99)
MONOCYTES # BLD: 0.5 K/UL (ref 0–1)
MONOCYTES NFR BLD: 6 % (ref 5–13)
NEUTS SEG # BLD: 6.2 K/UL (ref 1.8–8)
NEUTS SEG NFR BLD: 73 % (ref 32–75)
NRBC # BLD: 0 K/UL (ref 0–0.01)
NRBC BLD-RTO: 0 PER 100 WBC
PERFORMED BY:: ABNORMAL
PLATELET # BLD AUTO: 191 K/UL (ref 150–400)
POTASSIUM SERPL-SCNC: 3.7 MMOL/L (ref 3.5–5.1)
PROTHROMBIN TIME: 16.6 SEC (ref 11.9–14.6)
RBC # BLD AUTO: 3.85 M/UL (ref 4.1–5.7)
SODIUM SERPL-SCNC: 142 MMOL/L (ref 136–145)
TIBC SERPL-MCNC: 190 UG/DL (ref 250–450)
TROPONIN I SERPL HS-MCNC: 1022 NG/L (ref 0–76)
UFH PPP CHRO-ACNC: 0.21 IU/ML
UFH PPP CHRO-ACNC: 0.29 IU/ML
UFH PPP CHRO-ACNC: <0.1 IU/ML
WBC # BLD AUTO: 8.5 K/UL (ref 4.1–11.1)

## 2024-11-08 PROCEDURE — 84484 ASSAY OF TROPONIN QUANT: CPT

## 2024-11-08 PROCEDURE — 82962 GLUCOSE BLOOD TEST: CPT

## 2024-11-08 PROCEDURE — 2060000000 HC ICU INTERMEDIATE R&B

## 2024-11-08 PROCEDURE — 6360000004 HC RX CONTRAST MEDICATION

## 2024-11-08 PROCEDURE — 83540 ASSAY OF IRON: CPT

## 2024-11-08 PROCEDURE — 36415 COLL VENOUS BLD VENIPUNCTURE: CPT

## 2024-11-08 PROCEDURE — 83735 ASSAY OF MAGNESIUM: CPT

## 2024-11-08 PROCEDURE — 85025 COMPLETE CBC W/AUTO DIFF WBC: CPT

## 2024-11-08 PROCEDURE — 027034Z DILATION OF CORONARY ARTERY, ONE ARTERY WITH DRUG-ELUTING INTRALUMINAL DEVICE, PERCUTANEOUS APPROACH: ICD-10-PCS | Performed by: INTERNAL MEDICINE

## 2024-11-08 PROCEDURE — 6370000000 HC RX 637 (ALT 250 FOR IP)

## 2024-11-08 PROCEDURE — 82728 ASSAY OF FERRITIN: CPT

## 2024-11-08 PROCEDURE — 6360000002 HC RX W HCPCS

## 2024-11-08 PROCEDURE — 2580000003 HC RX 258

## 2024-11-08 PROCEDURE — B2111ZZ FLUOROSCOPY OF MULTIPLE CORONARY ARTERIES USING LOW OSMOLAR CONTRAST: ICD-10-PCS | Performed by: INTERNAL MEDICINE

## 2024-11-08 PROCEDURE — 93321 DOPPLER ECHO F-UP/LMTD STD: CPT

## 2024-11-08 PROCEDURE — 85520 HEPARIN ASSAY: CPT

## 2024-11-08 PROCEDURE — 02703ZZ DILATION OF CORONARY ARTERY, ONE ARTERY, PERCUTANEOUS APPROACH: ICD-10-PCS | Performed by: INTERNAL MEDICINE

## 2024-11-08 PROCEDURE — 80048 BASIC METABOLIC PNL TOTAL CA: CPT

## 2024-11-08 PROCEDURE — 85610 PROTHROMBIN TIME: CPT

## 2024-11-08 RX ORDER — HYDRALAZINE HYDROCHLORIDE 50 MG/1
50 TABLET, FILM COATED ORAL 3 TIMES DAILY
Status: DISCONTINUED | OUTPATIENT
Start: 2024-11-08 | End: 2024-11-12 | Stop reason: HOSPADM

## 2024-11-08 RX ORDER — DEXTROSE MONOHYDRATE 100 MG/ML
INJECTION, SOLUTION INTRAVENOUS CONTINUOUS PRN
Status: DISCONTINUED | OUTPATIENT
Start: 2024-11-08 | End: 2024-11-12 | Stop reason: HOSPADM

## 2024-11-08 RX ORDER — HEPARIN SODIUM 1000 [USP'U]/ML
4000 INJECTION, SOLUTION INTRAVENOUS; SUBCUTANEOUS PRN
Status: DISCONTINUED | OUTPATIENT
Start: 2024-11-08 | End: 2024-11-09

## 2024-11-08 RX ORDER — CLOPIDOGREL BISULFATE 75 MG/1
75 TABLET ORAL DAILY
Status: DISCONTINUED | OUTPATIENT
Start: 2024-11-08 | End: 2024-11-11 | Stop reason: SDUPTHER

## 2024-11-08 RX ORDER — ONDANSETRON 4 MG/1
4 TABLET, ORALLY DISINTEGRATING ORAL EVERY 8 HOURS PRN
Status: DISCONTINUED | OUTPATIENT
Start: 2024-11-08 | End: 2024-11-12 | Stop reason: HOSPADM

## 2024-11-08 RX ORDER — FUROSEMIDE 10 MG/ML
40 INJECTION INTRAMUSCULAR; INTRAVENOUS DAILY
Status: DISCONTINUED | OUTPATIENT
Start: 2024-11-08 | End: 2024-11-12 | Stop reason: HOSPADM

## 2024-11-08 RX ORDER — HEPARIN SODIUM 1000 [USP'U]/ML
2000 INJECTION, SOLUTION INTRAVENOUS; SUBCUTANEOUS PRN
Status: DISCONTINUED | OUTPATIENT
Start: 2024-11-08 | End: 2024-11-09

## 2024-11-08 RX ORDER — SODIUM CHLORIDE 0.9 % (FLUSH) 0.9 %
5-40 SYRINGE (ML) INJECTION EVERY 12 HOURS SCHEDULED
Status: DISCONTINUED | OUTPATIENT
Start: 2024-11-08 | End: 2024-11-09

## 2024-11-08 RX ORDER — POTASSIUM CHLORIDE 7.45 MG/ML
10 INJECTION INTRAVENOUS PRN
Status: DISCONTINUED | OUTPATIENT
Start: 2024-11-08 | End: 2024-11-12 | Stop reason: HOSPADM

## 2024-11-08 RX ORDER — GLUCAGON 1 MG/ML
1 KIT INJECTION PRN
Status: DISCONTINUED | OUTPATIENT
Start: 2024-11-08 | End: 2024-11-12 | Stop reason: HOSPADM

## 2024-11-08 RX ORDER — ONDANSETRON 2 MG/ML
4 INJECTION INTRAMUSCULAR; INTRAVENOUS EVERY 6 HOURS PRN
Status: DISCONTINUED | OUTPATIENT
Start: 2024-11-08 | End: 2024-11-12 | Stop reason: HOSPADM

## 2024-11-08 RX ORDER — SODIUM CHLORIDE 0.9 % (FLUSH) 0.9 %
5-40 SYRINGE (ML) INJECTION PRN
Status: DISCONTINUED | OUTPATIENT
Start: 2024-11-08 | End: 2024-11-12 | Stop reason: HOSPADM

## 2024-11-08 RX ORDER — CARVEDILOL 12.5 MG/1
12.5 TABLET ORAL 2 TIMES DAILY
Status: DISCONTINUED | OUTPATIENT
Start: 2024-11-08 | End: 2024-11-12 | Stop reason: HOSPADM

## 2024-11-08 RX ORDER — SODIUM CHLORIDE 9 MG/ML
INJECTION, SOLUTION INTRAVENOUS PRN
Status: DISCONTINUED | OUTPATIENT
Start: 2024-11-08 | End: 2024-11-12 | Stop reason: HOSPADM

## 2024-11-08 RX ORDER — HEPARIN SODIUM 10000 [USP'U]/100ML
5-30 INJECTION, SOLUTION INTRAVENOUS CONTINUOUS
Status: DISCONTINUED | OUTPATIENT
Start: 2024-11-08 | End: 2024-11-09

## 2024-11-08 RX ORDER — HEPARIN SODIUM 1000 [USP'U]/ML
60 INJECTION, SOLUTION INTRAVENOUS; SUBCUTANEOUS ONCE
Status: DISCONTINUED | OUTPATIENT
Start: 2024-11-08 | End: 2024-11-08

## 2024-11-08 RX ORDER — INSULIN LISPRO 100 [IU]/ML
0-4 INJECTION, SOLUTION INTRAVENOUS; SUBCUTANEOUS
Status: DISCONTINUED | OUTPATIENT
Start: 2024-11-08 | End: 2024-11-12 | Stop reason: HOSPADM

## 2024-11-08 RX ORDER — ALBUTEROL SULFATE 90 UG/1
2 INHALANT RESPIRATORY (INHALATION) 4 TIMES DAILY PRN
Status: DISCONTINUED | OUTPATIENT
Start: 2024-11-08 | End: 2024-11-12 | Stop reason: HOSPADM

## 2024-11-08 RX ORDER — EZETIMIBE 10 MG/1
10 TABLET ORAL DAILY
Status: DISCONTINUED | OUTPATIENT
Start: 2024-11-08 | End: 2024-11-12 | Stop reason: HOSPADM

## 2024-11-08 RX ORDER — HEPARIN SODIUM 1000 [USP'U]/ML
60 INJECTION, SOLUTION INTRAVENOUS; SUBCUTANEOUS PRN
Status: DISCONTINUED | OUTPATIENT
Start: 2024-11-08 | End: 2024-11-08

## 2024-11-08 RX ORDER — ISOSORBIDE MONONITRATE 60 MG/1
120 TABLET, EXTENDED RELEASE ORAL DAILY
Status: DISCONTINUED | OUTPATIENT
Start: 2024-11-08 | End: 2024-11-12 | Stop reason: HOSPADM

## 2024-11-08 RX ORDER — POTASSIUM CHLORIDE 1500 MG/1
40 TABLET, EXTENDED RELEASE ORAL PRN
Status: DISCONTINUED | OUTPATIENT
Start: 2024-11-08 | End: 2024-11-12 | Stop reason: HOSPADM

## 2024-11-08 RX ORDER — ENOXAPARIN SODIUM 100 MG/ML
40 INJECTION SUBCUTANEOUS DAILY
Status: DISCONTINUED | OUTPATIENT
Start: 2024-11-08 | End: 2024-11-08

## 2024-11-08 RX ORDER — ACETAMINOPHEN 325 MG/1
650 TABLET ORAL EVERY 6 HOURS PRN
Status: DISCONTINUED | OUTPATIENT
Start: 2024-11-08 | End: 2024-11-11 | Stop reason: SDUPTHER

## 2024-11-08 RX ORDER — ACETAMINOPHEN 650 MG/1
650 SUPPOSITORY RECTAL EVERY 6 HOURS PRN
Status: DISCONTINUED | OUTPATIENT
Start: 2024-11-08 | End: 2024-11-12 | Stop reason: HOSPADM

## 2024-11-08 RX ORDER — ASPIRIN 81 MG/1
81 TABLET, CHEWABLE ORAL DAILY
Status: DISCONTINUED | OUTPATIENT
Start: 2024-11-08 | End: 2024-11-11 | Stop reason: SDUPTHER

## 2024-11-08 RX ORDER — HEPARIN SODIUM 1000 [USP'U]/ML
30 INJECTION, SOLUTION INTRAVENOUS; SUBCUTANEOUS PRN
Status: DISCONTINUED | OUTPATIENT
Start: 2024-11-08 | End: 2024-11-08

## 2024-11-08 RX ORDER — POLYETHYLENE GLYCOL 3350 17 G/17G
17 POWDER, FOR SOLUTION ORAL DAILY PRN
Status: DISCONTINUED | OUTPATIENT
Start: 2024-11-08 | End: 2024-11-12 | Stop reason: HOSPADM

## 2024-11-08 RX ORDER — MAGNESIUM SULFATE IN WATER 40 MG/ML
2000 INJECTION, SOLUTION INTRAVENOUS PRN
Status: DISCONTINUED | OUTPATIENT
Start: 2024-11-08 | End: 2024-11-12 | Stop reason: HOSPADM

## 2024-11-08 RX ADMIN — HEPARIN SODIUM 2000 UNITS: 1000 INJECTION INTRAVENOUS; SUBCUTANEOUS at 12:46

## 2024-11-08 RX ADMIN — CLOPIDOGREL BISULFATE 75 MG: 75 TABLET ORAL at 08:16

## 2024-11-08 RX ADMIN — HEPARIN SODIUM 18 UNITS/KG/HR: 10000 INJECTION, SOLUTION INTRAVENOUS at 19:24

## 2024-11-08 RX ADMIN — SODIUM CHLORIDE, PRESERVATIVE FREE 10 ML: 5 INJECTION INTRAVENOUS at 08:16

## 2024-11-08 RX ADMIN — CARVEDILOL 12.5 MG: 12.5 TABLET, FILM COATED ORAL at 08:16

## 2024-11-08 RX ADMIN — CARVEDILOL 12.5 MG: 12.5 TABLET, FILM COATED ORAL at 21:00

## 2024-11-08 RX ADMIN — SODIUM CHLORIDE, PRESERVATIVE FREE 10 ML: 5 INJECTION INTRAVENOUS at 21:03

## 2024-11-08 RX ADMIN — ASPIRIN 81 MG: 81 TABLET, CHEWABLE ORAL at 08:16

## 2024-11-08 RX ADMIN — FUROSEMIDE 40 MG: 10 INJECTION, SOLUTION INTRAMUSCULAR; INTRAVENOUS at 08:16

## 2024-11-08 RX ADMIN — HEPARIN SODIUM 2000 UNITS: 1000 INJECTION INTRAVENOUS; SUBCUTANEOUS at 21:08

## 2024-11-08 RX ADMIN — ISOSORBIDE MONONITRATE 120 MG: 60 TABLET, EXTENDED RELEASE ORAL at 08:15

## 2024-11-08 RX ADMIN — HYDRALAZINE HYDROCHLORIDE 50 MG: 50 TABLET ORAL at 21:00

## 2024-11-08 RX ADMIN — HEPARIN SODIUM 4000 UNITS: 1000 INJECTION INTRAVENOUS; SUBCUTANEOUS at 04:53

## 2024-11-08 RX ADMIN — PERFLUTREN 2 ML: 6.52 INJECTION, SUSPENSION INTRAVENOUS at 12:34

## 2024-11-08 RX ADMIN — HYDRALAZINE HYDROCHLORIDE 50 MG: 50 TABLET ORAL at 08:16

## 2024-11-08 RX ADMIN — HYDRALAZINE HYDROCHLORIDE 50 MG: 50 TABLET ORAL at 14:00

## 2024-11-08 RX ADMIN — EZETIMIBE 10 MG: 10 TABLET ORAL at 08:15

## 2024-11-08 ASSESSMENT — PAIN SCALES - GENERAL: PAINLEVEL_OUTOF10: 0

## 2024-11-08 NOTE — ED PROVIDER NOTES
[facility] EMERGENCY DEPT  EMERGENCY DEPARTMENT HISTORY AND PHYSICAL EXAM      Date: 11/7/2024  Patient Name: Author PRATEEK Williamson  MRN: [id number]  YOB: 1950  Date of evaluation: 11/7/2024  Provider: Becca Bhandari MD   Note Started: 8:00 PM EST 11/7/24    HISTORY OF PRESENT ILLNESS     Chief Complaint   Patient presents with    Shortness of Breath       History Provided By: Patient    HPI: Author PRATEEK Williamson is a 74 y.o. male SOB and a productive cough since yesterday.  He denies chest pain.  No fevers or chills, nausea, vomiting,or  diarrhea.  Patient was diagnosed with pneumonia in September 2024.    PAST MEDICAL HISTORY   Past Medical History:  Past Medical History:   Diagnosis Date    Anemia     CHF (congestive heart failure) ([facility])     Chronic kidney disease     CKD (chronic kidney disease) stage 3, GFR 30-59 ml/min (HCC)     Diabetes (HCC)     Heart failure (HCC)     cardiomyopatyh    Hyperlipidemia     Hypertension     Ill-defined condition     CKD stage III    Pneumonia 03/19/2021    CAP community aquired pneumonia       Past Surgical History:  Past Surgical History:   Procedure Laterality Date    CARDIAC CATHETERIZATION      stent 4/9/21       Family History:  Family History   Problem Relation Age of Onset    Diabetes Mother     Osteoarthritis Mother        Social History:  Social History     Tobacco Use    Smoking status: Never    Smokeless tobacco: Current     Types: Chew   Vaping Use    Vaping status: Never Used   Substance Use Topics    Alcohol use: Not Currently    Drug use: Never       Allergies:  Allergies   Allergen Reactions    Statins Other (See Comments)     twitching    Reaction Type: Side Effect; Severity: Severe; Reaction(s): myalgia       PCP: None, None    Current Meds:   No current facility-administered medications for this encounter.     Current Outpatient Medications   Medication Sig Dispense Refill    allopurinol (ZYLOPRIM) 300 MG tablet Take 1 tablet by mouth daily      aspirin 81 MG

## 2024-11-08 NOTE — ED TRIAGE NOTES
Reports last night started with shortness of breath and productive cough. Reports hx CHF. 2+ pitting edema to BLE. Also reports intermittent pains to chest.

## 2024-11-08 NOTE — ED NOTES
TRANSFER - OUT REPORT:    Verbal report given to Maricel Woods RN on Author PRATEEK Williamson  being transferred to [address] [place] [address] for urgent transfer       Report consisted of patient's Situation, Background, Assessment and   Recommendations(SBAR).     Information from the following report(s) ED Encounter Summary, ED SBAR, MAR, and Recent Results was reviewed with the receiving nurse.    [place] Fall Assessment:    Presents to emergency department  because of falls (Syncope, seizure, or loss of consciousness): No  Age > 70: Yes  Altered Mental Status, Intoxication with alcohol or substance confusion (Disorientation, impaired judgment, poor safety awaremess, or inability to follow instructions): No  Impaired Mobility: Ambulates or transfers with assistive devices or assistance; Unable to ambulate or transer.: No  Nursing Judgement: Yes          Lines:   Peripheral IV 11/07/24 Right Antecubital (Active)   Site Assessment Clean, dry & intact 11/07/24 1956   Line Status Blood return noted 11/07/24 1956   Phlebitis Assessment No symptoms 11/07/24 1956   Infiltration Assessment 0 11/07/24 1956   Dressing Status New dressing applied 11/07/24 1956   Dressing Type Transparent 11/07/24 1956   Dressing Intervention New 11/07/24 1956       Peripheral IV 11/07/24 Right Forearm (Active)   Site Assessment Clean, dry & intact 11/07/24 2201   Line Status Blood return noted 11/07/24 2201   Phlebitis Assessment No symptoms 11/07/24 2201   Infiltration Assessment 0 11/07/24 2201   Dressing Status New dressing applied 11/07/24 2201   Dressing Type Transparent 11/07/24 2201   Dressing Intervention New 11/07/24 2201        Opportunity for questions and clarification was provided.      Patient transported with:  Monitor, [facility] paramedic

## 2024-11-08 NOTE — CARE COORDINATION
11/08/24 0952   Service Assessment   Patient Orientation Alert and Oriented   Cognition Alert   History Provided By Patient   Primary Caregiver Self   Accompanied By/Relationship Alone in room   Support Systems Spouse/Significant Other   Patient's Healthcare Decision Maker is: Legal Next of Kin   PCP Verified by CM Yes   Last Visit to PCP Within last 6 months   Prior Functional Level Independent in ADLs/IADLs   Current Functional Level Independent in ADLs/IADLs   Can patient return to prior living arrangement Yes   Ability to make needs known: Good   Family able to assist with home care needs: Other (comment)  (Uknown if wife is able to assist as patient states that his wife needs her own care assistance.)   Would you like for me to discuss the discharge plan with any other family members/significant others, and if so, who? No   Financial Resources Medicare   Community Resources None   Social/Functional History   Lives With Spouse   Type of Home House   Home Equipment None   Discharge Planning   Type of Residence House   Living Arrangements Spouse/Significant Other   Current Services Prior To Admission None   Potential Assistance Needed N/A   Type of Home Care Services None   Patient expects to be discharged to: House   Services At/After Discharge   Transition of Care Consult (CM Consult) N/A   Services At/After Discharge None   Mode of Transport at Discharge Other (see comment)  (Patient unsure about transportation at this time)   Confirm Follow Up Transport Self     CM assessment complete and demographics confirmed. Patient states that he lives in a house with his wife. No personal history of HH but states that his wife has had HH in the past. Patient states that he is INDEP in his daily care. Patient would like to work with therapy here to see if there are any needs assessed on initial screen. Patient states that he is unsure at this time who his ride will be at the time of discharge.     Preferred pharmacy is  [facility] [place]. No interest in meds to beds at this time.     Advance Care Planning     General Advance Care Planning (ACP) Conversation    Date of Conversation: 11/8/2024  Conducted with: Patient with Decision Making Capacity  Other persons present: None    Healthcare Decision Maker:   Primary Decision Maker: Patricia Williamson - [facility] - [phone]       Content/Action Overview:  Has NO ACP documents-Information provided  Reviewed DNR/DNI and patient elects Full Code (Attempt Resuscitation)        Length of Voluntary ACP Conversation in minutes:  <16 minutes (Non-Billable)

## 2024-11-08 NOTE — PROGRESS NOTES
Med rec in progress. Pt states he does not know all of his home medications and that his daughter would bring the list tomorrow.

## 2024-11-08 NOTE — CONSULTS
Cardiology Consult    NAME: Author PRATEEK Williamson   :  1950   MRN:  [id number]     Date/Time:  2024 10:15 AM    Patient PCP: None, None  ________________________________________________________________________     Assessment/Plan:   Shortness of breath, started while watching TV, patient talked to daughter who advised him to come to the emergency room.  Patient said he feels better and is ready to go home.  Known severe cardiomyopathy,  echo with ejection fraction of 30 to 35%.  Negative for amyloidosis by technetium scan .  Elevated NT proBNP.    Troponin leak, mild, patient denies any chest pain?  Type II MI?  Anginal equivalent shortness of breath.    Coronary artery disease, status post stenting of LAD in     Hypertension    Chronic kidney disease    Anemia    Diabetes           []        High complexity decision making was performed        Subjective:   CHIEF COMPLAINT:     Shortness of breath  REASON FOR CONSULT:  Troponin leak    HISTORY OF PRESENT ILLNESS:     Author PRATEEK Williamson is a 74 y.o. Black /  male who presents with shortness of breath.  Patient says he was watching TV when he got short of breath.  He did speak to his daughter who advised him to come to the emergency room.  Patient denies any chest pain.  Says he is feeling better.  History of cardiomyopathy?  Hypertrophic or infiltrative, negative technetium scan.    Had pneumonia about 2 to 3 months ago.      Past Medical History:   Diagnosis Date    Anemia     CHF (congestive heart failure) (HCC)     Chronic kidney disease     CKD (chronic kidney disease) stage 3, GFR 30-59 ml/min (HCC)     Diabetes (HCC)     Heart failure (HCC)     cardiomyopatyh    Hyperlipidemia     Hypertension     Ill-defined condition     CKD stage III    Pneumonia 2021    CAP community aquired pneumonia      Past Surgical History:   Procedure Laterality Date    CARDIAC CATHETERIZATION      stent 21     Allergies   Allergen     Troponin, High Sensitivity 1,022 (HH) 0 - 76 ng/L   CBC with Auto Differential    Collection Time: 11/08/24  3:19 AM   Result Value Ref Range    WBC 8.5 4.1 - 11.1 K/uL    RBC 3.85 (L) 4.10 - 5.70 M/uL    Hemoglobin 9.4 (L) 12.1 - 17.0 g/dL    Hematocrit 29.6 (L) 36.6 - 50.3 %    MCV 76.9 (L) 80.0 - 99.0 FL    MCH 24.4 (L) 26.0 - 34.0 PG    MCHC 31.8 30.0 - 36.5 g/dL    RDW 15.7 (H) 11.5 - 14.5 %    Platelets 191 150 - 400 K/uL    Nucleated RBCs 0.0 0.0  WBC    nRBC 0.00 0.00 - 0.01 K/uL    Neutrophils % 73 32 - 75 %    Lymphocytes % 19 12 - 49 %    Monocytes % 6 5 - 13 %    Eosinophils % 2 0 - 7 %    Basophils % 0 0 - 1 %    Immature Granulocytes % 0 0 - 0.5 %    Neutrophils Absolute 6.2 1.8 - 8.0 K/UL    Lymphocytes Absolute 1.6 0.8 - 3.5 K/UL    Monocytes Absolute 0.5 0.0 - 1.0 K/UL    Eosinophils Absolute 0.2 0.0 - 0.4 K/UL    Basophils Absolute 0.0 0.0 - 0.1 K/UL    Immature Granulocytes Absolute 0.0 0.00 - 0.04 K/UL    Differential Type AUTOMATED     Protime-INR    Collection Time: 11/08/24  3:19 AM   Result Value Ref Range    Protime 16.6 (H) 11.9 - 14.6 sec    INR 1.3 (H) 0.9 - 1.1     Heparin, Anti-XA    Collection Time: 11/08/24  3:19 AM   Result Value Ref Range    Heparin Xa,LMWH and Unfrac <0.10 IU/mL   POCT Glucose    Collection Time: 11/08/24  8:08 AM   Result Value Ref Range    POC Glucose 128 (H) 65 - 100 mg/dL    Performed by: Jayant Catalan MD

## 2024-11-08 NOTE — PROGRESS NOTES
Received Order for Telemetry     Author PRATEEK Williamson   1950   [id number]   Acute exacerbation of chronic heart failure (HCC) [I50.9]   Vashti Velasquez MD     Tele Box # [id number] placed on patient at  0037 am  ER Room # [facility] direct admit  Admitting to Room 487  Verified with Primary Nurse ANIKA at  0037 am

## 2024-11-08 NOTE — H&P
Hospitalist Admission Note    NAME: Author PRATEEK Williamson   :  1950   MRN:  [id number]     Date/Time:  2024 1:00 AM    Patient PCP: None, None    ______________________________________________________________________  Given the patient's current clinical presentation, I have a high level of concern for decompensation if discharged from the emergency department.  Complex decision making was performed, which includes reviewing the patient's available past medical records, laboratory results, and x-ray films.       My assessment of this patient's clinical condition and my plan of care is as follows.    Assessment / Plan:  CHF exacerbation  NSTEMI  BNP 32,644.    Chest x-ray shows bilateral interstitial opacities in mid and lower lungs favored to represent pulmonary interstitial edema.   Troponin 1655.  On heparin  Will start IV diuresis  Cardiology consulted    CKD stage III  Creatinine 1.95, BUN 43.  Seems to be at patient's baseline  Continue to monitor closely especially with diuresis    Microcytic anemia  Hemoglobin 9.7, hematocrit 30.2  Check iron panel  Monitor while patient on heparin    Hypertension  Hyperlipidemia  CAD  Continue Plavix, carvedilol, hydralazine, Imdur  Continue Zetia, Crestor    Diabetes mellitus  Hold patient's home glipizide, insulin sliding scale with Accu-Cheks    Patient did not know his home medications, he will have his daughter bring list in a.m.  Ordered patient's medications that were verified on dispense report.  Medical Decision Making:   I personally reviewed labs: CBC, CMP, troponin, BNP  I personally reviewed imaging: CXR  Toxic drug monitoring: Heparin for bleeding, monitor H&H  Discussed case with: ED provider. After discussion I am in agreement that acuity of patient's medical condition necessitates hospital stay.      Code Status: Full  DVT Prophylaxis: Heparin  GI Prophylaxis: None indicated      Subjective:   CHIEF COMPLAINT: Shortness of breath    HISTORY OF

## 2024-11-08 NOTE — PROGRESS NOTES
Hospitalist Progress Note               Daily Progress Note: 11/8/2024      Hospital Day: 1     Chief complaint: No chief complaint on file.       Subjective:   Hospital course to date:  74-year-old male with PMH significant for CHF, CKD stage III, T2DM, HLD, HTN presented to ED for shortness of breath and productive cough.  Patient was transferred from [facility] for NSTEMI and heart failure exacerbation.  Patient stated his symptoms started few days ago with worsening shortness of breath with activity.  Denies chest pain/fever/chills.  Chest x-ray remarkable for bilateral interstitial opacities in mid and lower lungs favored pulmonary interstitial edema, lab workup significant for BNP greater than 32,000, troponin 1655, creatinine 1.95, BUN 43, hemoglobin 9.7, WBC 10.2.  Patient has been admitted for further workup.    11/8 patient was assessed at bedside in presence of RN, denies active complaint.        Medications reviewed  Current Facility-Administered Medications   Medication Dose Route Frequency    sodium chloride flush 0.9 % injection 5-40 mL  5-40 mL IntraVENous 2 times per day    sodium chloride flush 0.9 % injection 5-40 mL  5-40 mL IntraVENous PRN    0.9 % sodium chloride infusion   IntraVENous PRN    ondansetron (ZOFRAN-ODT) disintegrating tablet 4 mg  4 mg Oral Q8H PRN    Or    ondansetron (ZOFRAN) injection 4 mg  4 mg IntraVENous Q6H PRN    polyethylene glycol (GLYCOLAX) packet 17 g  17 g Oral Daily PRN    acetaminophen (TYLENOL) tablet 650 mg  650 mg Oral Q6H PRN    Or    acetaminophen (TYLENOL) suppository 650 mg  650 mg Rectal Q6H PRN    potassium chloride (KLOR-CON M) extended release tablet 40 mEq  40 mEq Oral PRN    Or    potassium bicarb-citric acid (EFFER-K) effervescent tablet 40 mEq  40 mEq Oral PRN    Or    potassium chloride 10 mEq/100 mL IVPB (Peripheral Line)  10 mEq IntraVENous PRN    magnesium sulfate 2000 mg in 50 mL IVPB premix  2,000 mg IntraVENous PRN     electrolyte appropriately    CKD stage IIIb  Creatinine/eGFR 1.97/35  Continue to monitor    Microcytic anemia  H/H 9.7/30.2    Hypertension  Hyperlipidemia  CAD  T2DM  Home meds resumed  Monitor        Code status: Full    Social determinants of health: none      Estimated discharge date//time frame/disposition: 24-48-hour    Barriers to discharge: Cardiology clearance          Jerel Negrete MD

## 2024-11-08 NOTE — NURSE NAVIGATOR
Heart Failure Education/Evaluation/Recommendations        Pt is off the unit for a test.

## 2024-11-08 NOTE — PROGRESS NOTES
4 Eyes Skin Assessment     NAME:  Author PRATEEK Williamson  YOB: 1950  MEDICAL RECORD NUMBER:  [id number]    The patient is being assessed for  Admission    I agree that at least one RN has performed a thorough Head to Toe Skin Assessment on the patient. ALL assessment sites listed below have been assessed.      Areas assessed by both nurses:    Head, Face, Ears, Shoulders, Back, Chest, Arms, Elbows, Hands, Sacrum. Buttock, Coccyx, Ischium, Legs. Feet and Heels, and Under Medical Devices         Does the Patient have a Wound? No noted wound(s)       Shashank Prevention initiated by RN: Yes  Wound Care Orders initiated by RN: No    Pressure Injury (Stage 3,4, Unstageable, DTI, NWPT, and Complex wounds) if present, place Wound referral order by RN under : No    New Ostomies, if present place, Ostomy referral order under : No     Nurse 1 eSignature: Electronically signed by Maricel Leger RN on 11/8/24 at 6:47 AM EST    **SHARE this note so that the co-signing nurse can place an eSignature**    Nurse 2 eSignature: Electronically signed by Mariel Lewis RN on 11/8/24 at 6:48 AM EST

## 2024-11-09 LAB
ALBUMIN SERPL-MCNC: 2.6 G/DL (ref 3.5–5)
ALBUMIN/GLOB SERPL: 0.6 (ref 1.1–2.2)
ALP SERPL-CCNC: 63 U/L (ref 45–117)
ALT SERPL-CCNC: 15 U/L (ref 12–78)
ANION GAP SERPL CALC-SCNC: 6 MMOL/L (ref 2–12)
AST SERPL W P-5'-P-CCNC: 12 U/L (ref 15–37)
BASOPHILS # BLD: 0 K/UL (ref 0–0.1)
BASOPHILS NFR BLD: 1 % (ref 0–1)
BILIRUB DIRECT SERPL-MCNC: 0.2 MG/DL (ref 0–0.2)
BILIRUB SERPL-MCNC: 0.6 MG/DL (ref 0.2–1)
BUN SERPL-MCNC: 42 MG/DL (ref 6–20)
BUN/CREAT SERPL: 22 (ref 12–20)
CA-I BLD-MCNC: 9.1 MG/DL (ref 8.5–10.1)
CHLORIDE SERPL-SCNC: 107 MMOL/L (ref 97–108)
CHOLEST SERPL-MCNC: 111 MG/DL
CO2 SERPL-SCNC: 28 MMOL/L (ref 21–32)
CREAT SERPL-MCNC: 1.88 MG/DL (ref 0.7–1.3)
DIFFERENTIAL METHOD BLD: ABNORMAL
EOSINOPHIL # BLD: 0.3 K/UL (ref 0–0.4)
EOSINOPHIL NFR BLD: 6 % (ref 0–7)
ERYTHROCYTE [DISTWIDTH] IN BLOOD BY AUTOMATED COUNT: 15.7 % (ref 11.5–14.5)
EST. AVERAGE GLUCOSE BLD GHB EST-MCNC: 137 MG/DL
GLOBULIN SER CALC-MCNC: 4.5 G/DL (ref 2–4)
GLUCOSE BLD STRIP.AUTO-MCNC: 107 MG/DL (ref 65–100)
GLUCOSE BLD STRIP.AUTO-MCNC: 132 MG/DL (ref 65–100)
GLUCOSE BLD STRIP.AUTO-MCNC: 139 MG/DL (ref 65–100)
GLUCOSE BLD STRIP.AUTO-MCNC: 141 MG/DL (ref 65–100)
GLUCOSE SERPL-MCNC: 85 MG/DL (ref 65–100)
HBA1C MFR BLD: 6.4 % (ref 4–5.6)
HCT VFR BLD AUTO: 30.8 % (ref 36.6–50.3)
HDLC SERPL-MCNC: 64 MG/DL
HDLC SERPL: 1.7 (ref 0–5)
HGB BLD-MCNC: 9.9 G/DL (ref 12.1–17)
IMM GRANULOCYTES # BLD AUTO: 0 K/UL (ref 0–0.04)
IMM GRANULOCYTES NFR BLD AUTO: 1 % (ref 0–0.5)
LDLC SERPL CALC-MCNC: 35.6 MG/DL (ref 0–100)
LIPID PANEL: NORMAL
LYMPHOCYTES # BLD: 1.7 K/UL (ref 0.8–3.5)
LYMPHOCYTES NFR BLD: 30 % (ref 12–49)
MAGNESIUM SERPL-MCNC: 1.8 MG/DL (ref 1.6–2.4)
MCH RBC QN AUTO: 24.4 PG (ref 26–34)
MCHC RBC AUTO-ENTMCNC: 32.1 G/DL (ref 30–36.5)
MCV RBC AUTO: 76 FL (ref 80–99)
MONOCYTES # BLD: 0.6 K/UL (ref 0–1)
MONOCYTES NFR BLD: 10 % (ref 5–13)
NEUTS SEG # BLD: 3.1 K/UL (ref 1.8–8)
NEUTS SEG NFR BLD: 52 % (ref 32–75)
NRBC # BLD: 0 K/UL (ref 0–0.01)
NRBC BLD-RTO: 0 PER 100 WBC
PERFORMED BY:: ABNORMAL
PHOSPHATE SERPL-MCNC: 3.2 MG/DL (ref 2.6–4.7)
PLATELET # BLD AUTO: 248 K/UL (ref 150–400)
POTASSIUM SERPL-SCNC: 3.8 MMOL/L (ref 3.5–5.1)
PROT SERPL-MCNC: 7.1 G/DL (ref 6.4–8.2)
RBC # BLD AUTO: 4.05 M/UL (ref 4.1–5.7)
SODIUM SERPL-SCNC: 141 MMOL/L (ref 136–145)
TRIGL SERPL-MCNC: 57 MG/DL
UFH PPP CHRO-ACNC: 0.37 IU/ML
UFH PPP CHRO-ACNC: 0.41 IU/ML
VLDLC SERPL CALC-MCNC: 11.4 MG/DL
WBC # BLD AUTO: 5.9 K/UL (ref 4.1–11.1)

## 2024-11-09 PROCEDURE — 2580000003 HC RX 258

## 2024-11-09 PROCEDURE — 6370000000 HC RX 637 (ALT 250 FOR IP)

## 2024-11-09 PROCEDURE — 83735 ASSAY OF MAGNESIUM: CPT

## 2024-11-09 PROCEDURE — 83036 HEMOGLOBIN GLYCOSYLATED A1C: CPT

## 2024-11-09 PROCEDURE — 2060000000 HC ICU INTERMEDIATE R&B

## 2024-11-09 PROCEDURE — 36415 COLL VENOUS BLD VENIPUNCTURE: CPT

## 2024-11-09 PROCEDURE — 6360000002 HC RX W HCPCS

## 2024-11-09 PROCEDURE — 80061 LIPID PANEL: CPT

## 2024-11-09 PROCEDURE — 85520 HEPARIN ASSAY: CPT

## 2024-11-09 PROCEDURE — 84100 ASSAY OF PHOSPHORUS: CPT

## 2024-11-09 PROCEDURE — 85025 COMPLETE CBC W/AUTO DIFF WBC: CPT

## 2024-11-09 PROCEDURE — 80048 BASIC METABOLIC PNL TOTAL CA: CPT

## 2024-11-09 PROCEDURE — 82962 GLUCOSE BLOOD TEST: CPT

## 2024-11-09 PROCEDURE — 80076 HEPATIC FUNCTION PANEL: CPT

## 2024-11-09 RX ADMIN — CLOPIDOGREL BISULFATE 75 MG: 75 TABLET ORAL at 11:40

## 2024-11-09 RX ADMIN — HYDRALAZINE HYDROCHLORIDE 50 MG: 50 TABLET ORAL at 08:45

## 2024-11-09 RX ADMIN — HEPARIN SODIUM 20 UNITS/KG/HR: 10000 INJECTION, SOLUTION INTRAVENOUS at 13:58

## 2024-11-09 RX ADMIN — ASPIRIN 81 MG: 81 TABLET, CHEWABLE ORAL at 08:45

## 2024-11-09 RX ADMIN — SODIUM CHLORIDE, PRESERVATIVE FREE 10 ML: 5 INJECTION INTRAVENOUS at 21:21

## 2024-11-09 RX ADMIN — HYDRALAZINE HYDROCHLORIDE 50 MG: 50 TABLET ORAL at 13:59

## 2024-11-09 RX ADMIN — ISOSORBIDE MONONITRATE 120 MG: 60 TABLET, EXTENDED RELEASE ORAL at 08:44

## 2024-11-09 RX ADMIN — CARVEDILOL 12.5 MG: 12.5 TABLET, FILM COATED ORAL at 08:45

## 2024-11-09 RX ADMIN — FUROSEMIDE 40 MG: 10 INJECTION, SOLUTION INTRAMUSCULAR; INTRAVENOUS at 08:45

## 2024-11-09 RX ADMIN — HYDRALAZINE HYDROCHLORIDE 50 MG: 50 TABLET ORAL at 21:21

## 2024-11-09 RX ADMIN — EZETIMIBE 10 MG: 10 TABLET ORAL at 08:45

## 2024-11-09 RX ADMIN — SODIUM CHLORIDE, PRESERVATIVE FREE 10 ML: 5 INJECTION INTRAVENOUS at 09:00

## 2024-11-09 RX ADMIN — CARVEDILOL 12.5 MG: 12.5 TABLET, FILM COATED ORAL at 21:21

## 2024-11-09 NOTE — PROGRESS NOTES
Progress Note      11/9/2024 2:39 PM  NAME: Author PRATEEK Williamson   MRN:  [id number]   Admit Diagnosis: Acute exacerbation of chronic heart failure (HCC) [I50.9]      Problem List:   Type II non-Q wave MI  CAD status post LAD stent  Cardiomyopathy with EF 35%-->now 20-25%  Hypertension  CKD  Type 2 diabetes     Assessment/Plan:   Stop heparin  Continue DAPT  Cardiac cath planned for Monday  Outpatient evaluation for ICD  Guideline directed medical therapy for CHF         []       High complexity decision making was performed in this patient at high risk for decompensation with multiple organ involvement.    Subjective:     Author PRATEEK Williamson brenda.  Discussed with RN events overnight.     Review of Systems:   Negative except for as noted above.    Objective:      Physical Exam:    Last 24hrs VS reviewed since prior progress note. Most recent are:    /70   Pulse 64   Temp 97.3 °F (36.3 °C) (Oral)   Resp 18   Wt 75.2 kg (165 lb 12.6 oz)   SpO2 99%   BMI 23.12 kg/m²     Intake/Output Summary (Last 24 hours) at 11/9/2024 1439  Last data filed at 11/9/2024 1144  Gross per 24 hour   Intake --   Output 2050 ml   Net -2050 ml        General Appearance: Alert; no acute distress.  Ears/Nose/Mouth/Throat: moist mucous membranes  Neck: Supple.  Chest: Lungs clear to auscultation bilaterally.  Cardiovascular: Regular rate and rhythm, S1S2 normal  Abdomen: Soft, non-tender, bowel sounds are active.  Extremities: No edema bilaterally.  Skin: Warm and dry.      PMH/SH reviewed - no change compared to H&P    Telemetry: NSR      []  No new EKG for review    Lab Data Personally Reviewed:    Recent Labs     11/08/24 0319 11/09/24  0059   WBC 8.5 5.9   HGB 9.4* 9.9*   HCT 29.6* 30.8*    248     Recent Labs     11/07/24 2135 11/08/24 0319   INR 1.2* 1.3*   APTT 40.3*  --       Recent Labs     11/07/24 1952 11/08/24 0319 11/09/24  0059    142 141   K 3.9 3.7 3.8    108 107   CO2 27 27 28   BUN 43*  45* 42*   MG  --  1.8 1.8     No results for input(s): \"CPK\" in the last 72 hours.    Invalid input(s): \"CPKMB\", \"CKNDX\", \"TROIQ\"  Lab Results   Component Value Date/Time    CHOL 111 11/09/2024 12:59 AM    HDL 64 11/09/2024 12:59 AM    LDL 35.6 11/09/2024 12:59 AM       Recent Labs     11/07/24 1952 11/09/24  0059   GLOB 4.0 4.5*     No results for input(s): \"PH\", \"PCO2\", \"PO2\" in the last 72 hours.    Medications Personally Reviewed:    Current Facility-Administered Medications   Medication Dose Route Frequency    sodium chloride flush 0.9 % injection 5-40 mL  5-40 mL IntraVENous 2 times per day    sodium chloride flush 0.9 % injection 5-40 mL  5-40 mL IntraVENous PRN    0.9 % sodium chloride infusion   IntraVENous PRN    ondansetron (ZOFRAN-ODT) disintegrating tablet 4 mg  4 mg Oral Q8H PRN    Or    ondansetron (ZOFRAN) injection 4 mg  4 mg IntraVENous Q6H PRN    polyethylene glycol (GLYCOLAX) packet 17 g  17 g Oral Daily PRN    acetaminophen (TYLENOL) tablet 650 mg  650 mg Oral Q6H PRN    Or    acetaminophen (TYLENOL) suppository 650 mg  650 mg Rectal Q6H PRN    potassium chloride (KLOR-CON M) extended release tablet 40 mEq  40 mEq Oral PRN    Or    potassium bicarb-citric acid (EFFER-K) effervescent tablet 40 mEq  40 mEq Oral PRN    Or    potassium chloride 10 mEq/100 mL IVPB (Peripheral Line)  10 mEq IntraVENous PRN    magnesium sulfate 2000 mg in 50 mL IVPB premix  2,000 mg IntraVENous PRN    heparin 25,000 units in dextrose 5% 250 mL (premix) infusion  5-30 Units/kg/hr IntraVENous Continuous    aspirin chewable tablet 81 mg  81 mg Oral Daily    carvedilol (COREG) tablet 12.5 mg  12.5 mg Oral BID    clopidogrel (PLAVIX) tablet 75 mg  75 mg Oral Daily    ezetimibe (ZETIA) tablet 10 mg  10 mg Oral Daily    hydrALAZINE (APRESOLINE) tablet 50 mg  50 mg Oral TID    isosorbide mononitrate (IMDUR) extended release tablet 120 mg  120 mg Oral Daily    albuterol sulfate HFA (PROVENTIL;VENTOLIN;PROAIR) 108 (90 Base)

## 2024-11-09 NOTE — PROGRESS NOTES
Hospitalist Progress Note               Daily Progress Note: 11/9/2024      Hospital Day: 2     Chief complaint: No chief complaint on file.       Subjective:   Hospital course to date:  74-year-old male with PMH significant for CHF, CKD stage III, T2DM, HLD, HTN presented to ED for shortness of breath and productive cough.  Patient was transferred from [facility] for NSTEMI and heart failure exacerbation.  Patient stated his symptoms started few days ago with worsening shortness of breath with activity.  Denies chest pain/fever/chills.  Chest x-ray remarkable for bilateral interstitial opacities in mid and lower lungs favored pulmonary interstitial edema, lab workup significant for BNP greater than 32,000, troponin 1655, creatinine 1.95, BUN 43, hemoglobin 9.7, WBC 10.2.  Patient has been admitted for further workup.    11/8 patient was assessed at bedside in presence of RN, denies active complaint.      11/9 patient was assessed at bedside in presence of RN, denies active complaint.    Medications reviewed  Current Facility-Administered Medications   Medication Dose Route Frequency    sodium chloride flush 0.9 % injection 5-40 mL  5-40 mL IntraVENous 2 times per day    sodium chloride flush 0.9 % injection 5-40 mL  5-40 mL IntraVENous PRN    0.9 % sodium chloride infusion   IntraVENous PRN    ondansetron (ZOFRAN-ODT) disintegrating tablet 4 mg  4 mg Oral Q8H PRN    Or    ondansetron (ZOFRAN) injection 4 mg  4 mg IntraVENous Q6H PRN    polyethylene glycol (GLYCOLAX) packet 17 g  17 g Oral Daily PRN    acetaminophen (TYLENOL) tablet 650 mg  650 mg Oral Q6H PRN    Or    acetaminophen (TYLENOL) suppository 650 mg  650 mg Rectal Q6H PRN    potassium chloride (KLOR-CON M) extended release tablet 40 mEq  40 mEq Oral PRN    Or    potassium bicarb-citric acid (EFFER-K) effervescent tablet 40 mEq  40 mEq Oral PRN    Or    potassium chloride 10 mEq/100 mL IVPB (Peripheral Line)  10 mEq IntraVENous PRN     Appropriate follow-up labs were ordered  [] Collateral history obtained from:               Assessment and Plan:    CHF exacerbation  NSTEMI  BNP 32K  Chest x-ray bilateral interstitial obesities mid and lower lungs  Troponin 1022 <1655  On heparin infusion  IV diuresis  Cardiology on board  4/24 echo EF 30-35%  4/24 negative for amyloidosis by technetium scan  Monitor I's and O's, BMP and replace electrolyte appropriately    CKD stage IIIb  Creatinine/eGFR 1.88/27  Continue to monitor    Microcytic anemia  H/H 9.9/38.8    Hypertension  Hyperlipidemia  CAD  T2DM  Home meds resumed  Monitor        Code status: Full    Social determinants of health: none      Estimated discharge date//time frame/disposition: 24-48-hour    Barriers to discharge: Cardiology clearance          Jerel Negrete MD

## 2024-11-09 NOTE — PLAN OF CARE
Problem: Chronic Conditions and Co-morbidities  Goal: Patient's chronic conditions and co-morbidity symptoms are monitored and maintained or improved  11/9/2024 0928 by Ethan Castellanos RN  Outcome: Progressing  11/9/2024 0551 by Kamilla Ca RN  Outcome: Progressing     Problem: Discharge Planning  Goal: Discharge to home or other facility with appropriate resources  Outcome: Progressing     Problem: Safety - Adult  Goal: Free from fall injury  11/9/2024 0928 by Ethan Castellanos RN  Outcome: Progressing  11/9/2024 0551 by Kamilla Ca RN  Outcome: Progressing

## 2024-11-10 LAB
ANION GAP SERPL CALC-SCNC: 8 MMOL/L (ref 2–12)
BASOPHILS # BLD: 0 K/UL (ref 0–0.1)
BASOPHILS NFR BLD: 1 % (ref 0–1)
BNP SERPL-MCNC: ABNORMAL PG/ML
BUN SERPL-MCNC: 47 MG/DL (ref 6–20)
BUN/CREAT SERPL: 26 (ref 12–20)
CA-I BLD-MCNC: 9.6 MG/DL (ref 8.5–10.1)
CHLORIDE SERPL-SCNC: 107 MMOL/L (ref 97–108)
CO2 SERPL-SCNC: 26 MMOL/L (ref 21–32)
CREAT SERPL-MCNC: 1.8 MG/DL (ref 0.7–1.3)
DIFFERENTIAL METHOD BLD: ABNORMAL
EOSINOPHIL # BLD: 0.4 K/UL (ref 0–0.4)
EOSINOPHIL NFR BLD: 9 % (ref 0–7)
ERYTHROCYTE [DISTWIDTH] IN BLOOD BY AUTOMATED COUNT: 15.5 % (ref 11.5–14.5)
GLUCOSE BLD STRIP.AUTO-MCNC: 105 MG/DL (ref 65–100)
GLUCOSE BLD STRIP.AUTO-MCNC: 166 MG/DL (ref 65–100)
GLUCOSE BLD STRIP.AUTO-MCNC: 185 MG/DL (ref 65–100)
GLUCOSE BLD STRIP.AUTO-MCNC: 199 MG/DL (ref 65–100)
GLUCOSE SERPL-MCNC: 108 MG/DL (ref 65–100)
HCT VFR BLD AUTO: 31.8 % (ref 36.6–50.3)
HGB BLD-MCNC: 10.1 G/DL (ref 12.1–17)
IMM GRANULOCYTES # BLD AUTO: 0 K/UL (ref 0–0.04)
IMM GRANULOCYTES NFR BLD AUTO: 0 % (ref 0–0.5)
LYMPHOCYTES # BLD: 1.4 K/UL (ref 0.8–3.5)
LYMPHOCYTES NFR BLD: 31 % (ref 12–49)
MAGNESIUM SERPL-MCNC: 1.7 MG/DL (ref 1.6–2.4)
MCH RBC QN AUTO: 24.3 PG (ref 26–34)
MCHC RBC AUTO-ENTMCNC: 31.8 G/DL (ref 30–36.5)
MCV RBC AUTO: 76.4 FL (ref 80–99)
MONOCYTES # BLD: 0.5 K/UL (ref 0–1)
MONOCYTES NFR BLD: 11 % (ref 5–13)
NEUTS SEG # BLD: 2.2 K/UL (ref 1.8–8)
NEUTS SEG NFR BLD: 48 % (ref 32–75)
NRBC # BLD: 0 K/UL (ref 0–0.01)
NRBC BLD-RTO: 0 PER 100 WBC
PERFORMED BY:: ABNORMAL
PHOSPHATE SERPL-MCNC: 3.6 MG/DL (ref 2.6–4.7)
PLATELET # BLD AUTO: 233 K/UL (ref 150–400)
POTASSIUM SERPL-SCNC: 3.7 MMOL/L (ref 3.5–5.1)
RBC # BLD AUTO: 4.16 M/UL (ref 4.1–5.7)
SODIUM SERPL-SCNC: 141 MMOL/L (ref 136–145)
UFH PPP CHRO-ACNC: <0.1 IU/ML
WBC # BLD AUTO: 4.5 K/UL (ref 4.1–11.1)

## 2024-11-10 PROCEDURE — 84100 ASSAY OF PHOSPHORUS: CPT

## 2024-11-10 PROCEDURE — 2060000000 HC ICU INTERMEDIATE R&B

## 2024-11-10 PROCEDURE — 6370000000 HC RX 637 (ALT 250 FOR IP)

## 2024-11-10 PROCEDURE — 82962 GLUCOSE BLOOD TEST: CPT

## 2024-11-10 PROCEDURE — 80048 BASIC METABOLIC PNL TOTAL CA: CPT

## 2024-11-10 PROCEDURE — 2580000003 HC RX 258

## 2024-11-10 PROCEDURE — 36415 COLL VENOUS BLD VENIPUNCTURE: CPT

## 2024-11-10 PROCEDURE — 85025 COMPLETE CBC W/AUTO DIFF WBC: CPT

## 2024-11-10 PROCEDURE — 83735 ASSAY OF MAGNESIUM: CPT

## 2024-11-10 PROCEDURE — 85520 HEPARIN ASSAY: CPT

## 2024-11-10 PROCEDURE — 6360000002 HC RX W HCPCS

## 2024-11-10 PROCEDURE — 83880 ASSAY OF NATRIURETIC PEPTIDE: CPT

## 2024-11-10 RX ADMIN — FUROSEMIDE 40 MG: 10 INJECTION, SOLUTION INTRAMUSCULAR; INTRAVENOUS at 08:33

## 2024-11-10 RX ADMIN — ISOSORBIDE MONONITRATE 120 MG: 60 TABLET, EXTENDED RELEASE ORAL at 08:31

## 2024-11-10 RX ADMIN — HYDRALAZINE HYDROCHLORIDE 50 MG: 50 TABLET ORAL at 08:31

## 2024-11-10 RX ADMIN — SODIUM CHLORIDE, PRESERVATIVE FREE 10 ML: 5 INJECTION INTRAVENOUS at 21:25

## 2024-11-10 RX ADMIN — CARVEDILOL 12.5 MG: 12.5 TABLET, FILM COATED ORAL at 08:30

## 2024-11-10 RX ADMIN — SODIUM CHLORIDE, PRESERVATIVE FREE 10 ML: 5 INJECTION INTRAVENOUS at 08:34

## 2024-11-10 RX ADMIN — EZETIMIBE 10 MG: 10 TABLET ORAL at 08:31

## 2024-11-10 RX ADMIN — CARVEDILOL 12.5 MG: 12.5 TABLET, FILM COATED ORAL at 21:25

## 2024-11-10 RX ADMIN — HYDRALAZINE HYDROCHLORIDE 50 MG: 50 TABLET ORAL at 21:25

## 2024-11-10 RX ADMIN — ASPIRIN 81 MG: 81 TABLET, CHEWABLE ORAL at 08:31

## 2024-11-10 RX ADMIN — CLOPIDOGREL BISULFATE 75 MG: 75 TABLET ORAL at 08:31

## 2024-11-10 RX ADMIN — HYDRALAZINE HYDROCHLORIDE 50 MG: 50 TABLET ORAL at 14:08

## 2024-11-10 NOTE — PROGRESS NOTES
Progress Note      11/10/2024 11:40 AM  NAME: Author PRATEEK Williamson   MRN:  [id number]   Admit Diagnosis: Acute exacerbation of chronic heart failure (HCC) [I50.9]      Problem List:   Type II non-Q wave MI  CAD status post LAD stent  Cardiomyopathy with EF 35%-->now 20-25%  Hypertension  CKD  Type 2 diabetes     Assessment/Plan:   Continue DAPT  [facility] tomorrow  Outpatient evaluation for ICD  Guideline directed medical therapy for CHF         []       High complexity decision making was performed in this patient at high risk for decompensation with multiple organ involvement.    Subjective:     Author PRATEEK Williamson brenda.  Discussed with RN events overnight.     Review of Systems:   Negative except for as noted above.    Objective:      Physical Exam:    Last 24hrs VS reviewed since prior progress note. Most recent are:    /68   Pulse 62   Temp 98.1 °F (36.7 °C) (Oral)   Resp 20   Ht 1.803 m (5' 10.98\")   Wt 75.2 kg (165 lb 12.6 oz)   SpO2 100%   BMI 23.13 kg/m²     Intake/Output Summary (Last 24 hours) at 11/10/2024 1140  Last data filed at 11/10/2024 0734  Gross per 24 hour   Intake 600 ml   Output 1300 ml   Net -700 ml        General Appearance: Alert; no acute distress.  Ears/Nose/Mouth/Throat: moist mucous membranes  Neck: Supple.  Chest: Lungs clear to auscultation bilaterally.  Cardiovascular: Regular rate and rhythm, S1S2 normal  Abdomen: Soft, non-tender, bowel sounds are active.  Extremities: No edema bilaterally.  Skin: Warm and dry.      PMH/SH reviewed - no change compared to H&P    Telemetry: NSR      []  No new EKG for review    Lab Data Personally Reviewed:    Recent Labs     11/09/24  0059 11/10/24  0536   WBC 5.9 4.5   HGB 9.9* 10.1*   HCT 30.8* 31.8*    233     Recent Labs     11/07/24 2135 11/08/24 0319   INR 1.2* 1.3*   APTT 40.3*  --       Recent Labs     11/08/24  0319 11/09/24  0059 11/10/24  0536    141 141   K 3.7 3.8 3.7    107 107   CO2 27 28 26   BUN 45*  mL  125 mL IntraVENous PRN    Or    dextrose bolus 10% 250 mL  250 mL IntraVENous PRN    glucagon injection 1 mg  1 mg SubCUTAneous PRN    dextrose 10 % infusion   IntraVENous Continuous PRN    insulin lispro (HUMALOG,ADMELOG) injection vial 0-4 Units  0-4 Units SubCUTAneous 4x Daily AC & HS         Yuliet Berman MD

## 2024-11-10 NOTE — PROGRESS NOTES
Hospitalist Progress Note               Daily Progress Note: 11/10/2024      Hospital Day: 3     Chief complaint: No chief complaint on file.       Subjective:   Hospital course to date:  74-year-old male with PMH significant for CHF, CKD stage III, T2DM, HLD, HTN presented to ED for shortness of breath and productive cough.  Patient was transferred from [facility] for NSTEMI and heart failure exacerbation.  Patient stated his symptoms started few days ago with worsening shortness of breath with activity.  Denies chest pain/fever/chills.  Chest x-ray remarkable for bilateral interstitial opacities in mid and lower lungs favored pulmonary interstitial edema, lab workup significant for BNP greater than 32,000, troponin 1655, creatinine 1.95, BUN 43, hemoglobin 9.7, WBC 10.2.  Patient has been admitted for further workup.    11/8 patient was assessed at bedside in presence of RN, denies active complaint.      11/9 patient was assessed at bedside in presence of RN, denies active complaint.    11/10 patient has no active complaints.  Left heart cath tomorrow, outpatient evaluation for ICD, GDMT for CHF.  Cardiology on board.    Medications reviewed  Current Facility-Administered Medications   Medication Dose Route Frequency    sodium chloride flush 0.9 % injection 5-40 mL  5-40 mL IntraVENous PRN    0.9 % sodium chloride infusion   IntraVENous PRN    ondansetron (ZOFRAN-ODT) disintegrating tablet 4 mg  4 mg Oral Q8H PRN    Or    ondansetron (ZOFRAN) injection 4 mg  4 mg IntraVENous Q6H PRN    polyethylene glycol (GLYCOLAX) packet 17 g  17 g Oral Daily PRN    acetaminophen (TYLENOL) tablet 650 mg  650 mg Oral Q6H PRN    Or    acetaminophen (TYLENOL) suppository 650 mg  650 mg Rectal Q6H PRN    potassium chloride (KLOR-CON M) extended release tablet 40 mEq  40 mEq Oral PRN    Or    potassium bicarb-citric acid (EFFER-K) effervescent tablet 40 mEq  40 mEq Oral PRN    Or    potassium chloride 10 mEq/100  mL IVPB (Peripheral Line)  10 mEq IntraVENous PRN    magnesium sulfate 2000 mg in 50 mL IVPB premix  2,000 mg IntraVENous PRN    aspirin chewable tablet 81 mg  81 mg Oral Daily    carvedilol (COREG) tablet 12.5 mg  12.5 mg Oral BID    clopidogrel (PLAVIX) tablet 75 mg  75 mg Oral Daily    ezetimibe (ZETIA) tablet 10 mg  10 mg Oral Daily    hydrALAZINE (APRESOLINE) tablet 50 mg  50 mg Oral TID    isosorbide mononitrate (IMDUR) extended release tablet 120 mg  120 mg Oral Daily    albuterol sulfate HFA (PROVENTIL;VENTOLIN;PROAIR) 108 (90 Base) MCG/ACT inhaler 2 puff  2 puff Inhalation 4x Daily PRN    furosemide (LASIX) injection 40 mg  40 mg IntraVENous Daily    glucose chewable tablet 16 g  4 tablet Oral PRN    dextrose bolus 10% 125 mL  125 mL IntraVENous PRN    Or    dextrose bolus 10% 250 mL  250 mL IntraVENous PRN    glucagon injection 1 mg  1 mg SubCUTAneous PRN    dextrose 10 % infusion   IntraVENous Continuous PRN    insulin lispro (HUMALOG,ADMELOG) injection vial 0-4 Units  0-4 Units SubCUTAneous 4x Daily AC & HS       Review of Systems:   Pertinent items are noted in HPI.    Objective:   Physical Exam:     /67   Pulse 61   Temp 97.9 °F (36.6 °C) (Oral)   Resp 20   Ht 1.803 m (5' 10.98\")   Wt 75.2 kg (165 lb 12.6 oz)   SpO2 97%   BMI 23.13 kg/m²    O2 Device: None (Room air)    Temp (24hrs), Av °F (36.7 °C), Min:97.5 °F (36.4 °C), Max:98.2 °F (36.8 °C)    11/10 0701 - 11/10 1900  In: 1000 [P.O.:1000]  Out: -    1901 - 11/10 07  In: -   Out: 2550 [Urine:2550]    Mode Rate TV Press PEEP FiO2 PIP Min. Vent                          General Alert  Cardiovascular RRR  Respiratory normal chest expansion bilaterally  Abdomen soft, nontender nondistended  Extremities no pedal edema  Neuro no focal neurological deficits          Data Review:       Recent Days:  Recent Labs     24  0319 24  0059 11/10/24  0536   WBC 8.5 5.9 4.5   HGB 9.4* 9.9* 10.1*   HCT 29.6* 30.8* 31.8*

## 2024-11-10 NOTE — PLAN OF CARE
Problem: Chronic Conditions and Co-morbidities  Goal: Patient's chronic conditions and co-morbidity symptoms are monitored and maintained or improved  11/10/2024 1532 by Carolina Dinh RN  Outcome: Progressing  Flowsheets (Taken 11/10/2024 0839)  Care Plan - Patient's Chronic Conditions and Co-Morbidity Symptoms are Monitored and Maintained or Improved:   Monitor and assess patient's chronic conditions and comorbid symptoms for stability, deterioration, or improvement   Collaborate with multidisciplinary team to address chronic and comorbid conditions and prevent exacerbation or deterioration   Update acute care plan with appropriate goals if chronic or comorbid symptoms are exacerbated and prevent overall improvement and discharge  11/10/2024 0303 by Kamilla Ca, RN  Outcome: Progressing     Problem: Discharge Planning  Goal: Discharge to home or other facility with appropriate resources  Outcome: Progressing  Flowsheets (Taken 11/10/2024 0839)  Discharge to home or other facility with appropriate resources:   Identify barriers to discharge with patient and caregiver   Arrange for needed discharge resources and transportation as appropriate   Identify discharge learning needs (meds, wound care, etc)   Refer to discharge planning if patient needs post-hospital services based on physician order or complex needs related to functional status, cognitive ability or social support system     Problem: Safety - Adult  Goal: Free from fall injury  11/10/2024 1532 by Carolina Dinh RN  Outcome: Progressing  11/10/2024 0303 by Kamilla aC RN  Outcome: Progressing

## 2024-11-11 LAB
ANION GAP SERPL CALC-SCNC: 8 MMOL/L (ref 2–12)
BASOPHILS # BLD: 0 K/UL (ref 0–0.1)
BASOPHILS NFR BLD: 1 % (ref 0–1)
BUN SERPL-MCNC: 45 MG/DL (ref 6–20)
BUN/CREAT SERPL: 26 (ref 12–20)
CA-I BLD-MCNC: 9.1 MG/DL (ref 8.5–10.1)
CHLORIDE SERPL-SCNC: 106 MMOL/L (ref 97–108)
CO2 SERPL-SCNC: 25 MMOL/L (ref 21–32)
CREAT SERPL-MCNC: 1.76 MG/DL (ref 0.7–1.3)
DIFFERENTIAL METHOD BLD: ABNORMAL
ECHO BSA: 1.94 M2
EOSINOPHIL # BLD: 0.4 K/UL (ref 0–0.4)
EOSINOPHIL NFR BLD: 7 % (ref 0–7)
ERYTHROCYTE [DISTWIDTH] IN BLOOD BY AUTOMATED COUNT: 15.2 % (ref 11.5–14.5)
GLUCOSE BLD STRIP.AUTO-MCNC: 132 MG/DL (ref 65–100)
GLUCOSE BLD STRIP.AUTO-MCNC: 133 MG/DL (ref 65–100)
GLUCOSE BLD STRIP.AUTO-MCNC: 153 MG/DL (ref 65–100)
GLUCOSE SERPL-MCNC: 148 MG/DL (ref 65–100)
HCT VFR BLD AUTO: 33.2 % (ref 36.6–50.3)
HGB BLD-MCNC: 10.7 G/DL (ref 12.1–17)
IMM GRANULOCYTES # BLD AUTO: 0 K/UL (ref 0–0.04)
IMM GRANULOCYTES NFR BLD AUTO: 1 % (ref 0–0.5)
LYMPHOCYTES # BLD: 1.5 K/UL (ref 0.8–3.5)
LYMPHOCYTES NFR BLD: 27 % (ref 12–49)
MAGNESIUM SERPL-MCNC: 1.9 MG/DL (ref 1.6–2.4)
MCH RBC QN AUTO: 24.5 PG (ref 26–34)
MCHC RBC AUTO-ENTMCNC: 32.2 G/DL (ref 30–36.5)
MCV RBC AUTO: 76 FL (ref 80–99)
MONOCYTES # BLD: 0.6 K/UL (ref 0–1)
MONOCYTES NFR BLD: 12 % (ref 5–13)
NEUTS SEG # BLD: 2.9 K/UL (ref 1.8–8)
NEUTS SEG NFR BLD: 52 % (ref 32–75)
NRBC # BLD: 0 K/UL (ref 0–0.01)
NRBC BLD-RTO: 0 PER 100 WBC
PERFORMED BY:: ABNORMAL
PHOSPHATE SERPL-MCNC: 3.4 MG/DL (ref 2.6–4.7)
PLATELET # BLD AUTO: 272 K/UL (ref 150–400)
POTASSIUM SERPL-SCNC: 3.7 MMOL/L (ref 3.5–5.1)
RBC # BLD AUTO: 4.37 M/UL (ref 4.1–5.7)
SODIUM SERPL-SCNC: 139 MMOL/L (ref 136–145)
WBC # BLD AUTO: 5.5 K/UL (ref 4.1–11.1)

## 2024-11-11 PROCEDURE — 84100 ASSAY OF PHOSPHORUS: CPT

## 2024-11-11 PROCEDURE — C9600 PERC DRUG-EL COR STENT SING: HCPCS | Performed by: INTERNAL MEDICINE

## 2024-11-11 PROCEDURE — C1887 CATHETER, GUIDING: HCPCS | Performed by: INTERNAL MEDICINE

## 2024-11-11 PROCEDURE — 2500000003 HC RX 250 WO HCPCS: Performed by: INTERNAL MEDICINE

## 2024-11-11 PROCEDURE — C1894 INTRO/SHEATH, NON-LASER: HCPCS | Performed by: INTERNAL MEDICINE

## 2024-11-11 PROCEDURE — 80048 BASIC METABOLIC PNL TOTAL CA: CPT

## 2024-11-11 PROCEDURE — 99152 MOD SED SAME PHYS/QHP 5/>YRS: CPT | Performed by: INTERNAL MEDICINE

## 2024-11-11 PROCEDURE — 92920 PRQ TRLUML C ANGIOP 1ART&/BR: CPT | Performed by: INTERNAL MEDICINE

## 2024-11-11 PROCEDURE — 7100000000 HC PACU RECOVERY - FIRST 15 MIN: Performed by: INTERNAL MEDICINE

## 2024-11-11 PROCEDURE — 6360000002 HC RX W HCPCS

## 2024-11-11 PROCEDURE — C1725 CATH, TRANSLUMIN NON-LASER: HCPCS | Performed by: INTERNAL MEDICINE

## 2024-11-11 PROCEDURE — 83735 ASSAY OF MAGNESIUM: CPT

## 2024-11-11 PROCEDURE — 6360000004 HC RX CONTRAST MEDICATION: Performed by: INTERNAL MEDICINE

## 2024-11-11 PROCEDURE — C1769 GUIDE WIRE: HCPCS | Performed by: INTERNAL MEDICINE

## 2024-11-11 PROCEDURE — 36415 COLL VENOUS BLD VENIPUNCTURE: CPT

## 2024-11-11 PROCEDURE — 93454 CORONARY ARTERY ANGIO S&I: CPT | Performed by: INTERNAL MEDICINE

## 2024-11-11 PROCEDURE — 82962 GLUCOSE BLOOD TEST: CPT

## 2024-11-11 PROCEDURE — 85347 COAGULATION TIME ACTIVATED: CPT

## 2024-11-11 PROCEDURE — 2060000000 HC ICU INTERMEDIATE R&B

## 2024-11-11 PROCEDURE — 6370000000 HC RX 637 (ALT 250 FOR IP): Performed by: INTERNAL MEDICINE

## 2024-11-11 PROCEDURE — 2580000003 HC RX 258

## 2024-11-11 PROCEDURE — 6360000002 HC RX W HCPCS: Performed by: INTERNAL MEDICINE

## 2024-11-11 PROCEDURE — 85025 COMPLETE CBC W/AUTO DIFF WBC: CPT

## 2024-11-11 PROCEDURE — 93005 ELECTROCARDIOGRAM TRACING: CPT | Performed by: INTERNAL MEDICINE

## 2024-11-11 PROCEDURE — 92921 HC PRQ CARDIAC ANGIO ADDL ART: CPT | Performed by: INTERNAL MEDICINE

## 2024-11-11 PROCEDURE — 99153 MOD SED SAME PHYS/QHP EA: CPT | Performed by: INTERNAL MEDICINE

## 2024-11-11 PROCEDURE — 93458 L HRT ARTERY/VENTRICLE ANGIO: CPT | Performed by: INTERNAL MEDICINE

## 2024-11-11 PROCEDURE — 2709999900 HC NON-CHARGEABLE SUPPLY: Performed by: INTERNAL MEDICINE

## 2024-11-11 PROCEDURE — 7100000001 HC PACU RECOVERY - ADDTL 15 MIN: Performed by: INTERNAL MEDICINE

## 2024-11-11 PROCEDURE — C1874 STENT, COATED/COV W/DEL SYS: HCPCS | Performed by: INTERNAL MEDICINE

## 2024-11-11 PROCEDURE — 6370000000 HC RX 637 (ALT 250 FOR IP)

## 2024-11-11 DEVICE — STENT ONYXNG22508UX ONYX 2.25X08RX
Type: IMPLANTABLE DEVICE | Status: FUNCTIONAL
Brand: ONYX FRONTIER™

## 2024-11-11 RX ORDER — FENTANYL CITRATE 50 UG/ML
INJECTION, SOLUTION INTRAMUSCULAR; INTRAVENOUS PRN
Status: DISCONTINUED | OUTPATIENT
Start: 2024-11-11 | End: 2024-11-11 | Stop reason: HOSPADM

## 2024-11-11 RX ORDER — LIDOCAINE HYDROCHLORIDE 10 MG/ML
INJECTION, SOLUTION INFILTRATION; PERINEURAL PRN
Status: DISCONTINUED | OUTPATIENT
Start: 2024-11-11 | End: 2024-11-11 | Stop reason: HOSPADM

## 2024-11-11 RX ORDER — HEPARIN SODIUM 1000 [USP'U]/ML
INJECTION, SOLUTION INTRAVENOUS; SUBCUTANEOUS PRN
Status: DISCONTINUED | OUTPATIENT
Start: 2024-11-11 | End: 2024-11-11 | Stop reason: HOSPADM

## 2024-11-11 RX ORDER — CLOPIDOGREL 300 MG/1
TABLET, FILM COATED ORAL PRN
Status: DISCONTINUED | OUTPATIENT
Start: 2024-11-11 | End: 2024-11-11 | Stop reason: HOSPADM

## 2024-11-11 RX ORDER — ACETAMINOPHEN 325 MG/1
650 TABLET ORAL EVERY 4 HOURS PRN
Status: DISCONTINUED | OUTPATIENT
Start: 2024-11-11 | End: 2024-11-12 | Stop reason: HOSPADM

## 2024-11-11 RX ORDER — CLOPIDOGREL BISULFATE 75 MG/1
75 TABLET ORAL DAILY
Status: DISCONTINUED | OUTPATIENT
Start: 2024-11-12 | End: 2024-11-12 | Stop reason: HOSPADM

## 2024-11-11 RX ORDER — VERAPAMIL HYDROCHLORIDE 2.5 MG/ML
INJECTION, SOLUTION INTRAVENOUS PRN
Status: DISCONTINUED | OUTPATIENT
Start: 2024-11-11 | End: 2024-11-11 | Stop reason: HOSPADM

## 2024-11-11 RX ORDER — NITROGLYCERIN 20 MG/100ML
INJECTION INTRAVENOUS CONTINUOUS PRN
Status: COMPLETED | OUTPATIENT
Start: 2024-11-11 | End: 2024-11-11

## 2024-11-11 RX ORDER — IOPAMIDOL 755 MG/ML
INJECTION, SOLUTION INTRAVASCULAR PRN
Status: DISCONTINUED | OUTPATIENT
Start: 2024-11-11 | End: 2024-11-11 | Stop reason: HOSPADM

## 2024-11-11 RX ORDER — MIDAZOLAM HYDROCHLORIDE 1 MG/ML
INJECTION, SOLUTION INTRAMUSCULAR; INTRAVENOUS PRN
Status: DISCONTINUED | OUTPATIENT
Start: 2024-11-11 | End: 2024-11-11 | Stop reason: HOSPADM

## 2024-11-11 RX ORDER — HEPARIN SODIUM 200 [USP'U]/100ML
INJECTION, SOLUTION INTRAVENOUS CONTINUOUS PRN
Status: COMPLETED | OUTPATIENT
Start: 2024-11-11 | End: 2024-11-11

## 2024-11-11 RX ORDER — ASPIRIN 81 MG/1
81 TABLET ORAL DAILY
Status: DISCONTINUED | OUTPATIENT
Start: 2024-11-12 | End: 2024-11-12 | Stop reason: HOSPADM

## 2024-11-11 RX ORDER — SODIUM CHLORIDE 9 MG/ML
INJECTION, SOLUTION INTRAVENOUS PRN
Status: ACTIVE | OUTPATIENT
Start: 2024-11-11 | End: 2024-11-11

## 2024-11-11 RX ADMIN — CLOPIDOGREL BISULFATE 75 MG: 75 TABLET ORAL at 09:20

## 2024-11-11 RX ADMIN — SODIUM CHLORIDE, PRESERVATIVE FREE 10 ML: 5 INJECTION INTRAVENOUS at 09:21

## 2024-11-11 RX ADMIN — ASPIRIN 81 MG: 81 TABLET, CHEWABLE ORAL at 09:20

## 2024-11-11 RX ADMIN — FUROSEMIDE 40 MG: 10 INJECTION, SOLUTION INTRAMUSCULAR; INTRAVENOUS at 09:20

## 2024-11-11 RX ADMIN — HYDRALAZINE HYDROCHLORIDE 50 MG: 50 TABLET ORAL at 20:29

## 2024-11-11 RX ADMIN — HYDRALAZINE HYDROCHLORIDE 50 MG: 50 TABLET ORAL at 09:20

## 2024-11-11 RX ADMIN — HYDRALAZINE HYDROCHLORIDE 50 MG: 50 TABLET ORAL at 14:09

## 2024-11-11 RX ADMIN — ISOSORBIDE MONONITRATE 120 MG: 60 TABLET, EXTENDED RELEASE ORAL at 09:20

## 2024-11-11 RX ADMIN — EZETIMIBE 10 MG: 10 TABLET ORAL at 09:20

## 2024-11-11 RX ADMIN — CARVEDILOL 12.5 MG: 12.5 TABLET, FILM COATED ORAL at 20:29

## 2024-11-11 RX ADMIN — CARVEDILOL 12.5 MG: 12.5 TABLET, FILM COATED ORAL at 09:20

## 2024-11-11 ASSESSMENT — PAIN - FUNCTIONAL ASSESSMENT
PAIN_FUNCTIONAL_ASSESSMENT: NONE - DENIES PAIN
PAIN_FUNCTIONAL_ASSESSMENT: 0-10
PAIN_FUNCTIONAL_ASSESSMENT: 0-10

## 2024-11-11 NOTE — CARE COORDINATION
Chart reviewed, DCP remains for patient to d/c home with spouse once medically stable, no CM needs at this time, will continue to follow and monitor chart for DC needs.

## 2024-11-11 NOTE — PROGRESS NOTES
Hospitalist Progress Note               Daily Progress Note: 11/11/2024      Hospital Day: 4     Chief complaint: No chief complaint on file.       Subjective:   Hospital course to date:  74-year-old male with PMH significant for CHF, CKD stage III, T2DM, HLD, HTN presented to ED for shortness of breath and productive cough.  Patient was transferred from [facility] for NSTEMI and heart failure exacerbation.  Patient stated his symptoms started few days ago with worsening shortness of breath with activity.  Denies chest pain/fever/chills.  Chest x-ray remarkable for bilateral interstitial opacities in mid and lower lungs favored pulmonary interstitial edema, lab workup significant for BNP greater than 32,000, troponin 1655, creatinine 1.95, BUN 43, hemoglobin 9.7, WBC 10.2.  Patient has been admitted for further workup.    11/8 patient was assessed at bedside in presence of RN, denies active complaint.      11/9 patient was assessed at bedside in presence of RN, denies active complaint.    11/10 patient has no active complaints.  Left heart cath tomorrow, outpatient evaluation for ICD, GDMT for CHF.  Cardiology on board.    11/11 patient has no active complaints.  Left heart cath today, outpatient evaluation for ICD, GDMT for CHF.  Cardiology on board.    Medications reviewed  Current Facility-Administered Medications   Medication Dose Route Frequency    sodium chloride flush 0.9 % injection 5-40 mL  5-40 mL IntraVENous PRN    0.9 % sodium chloride infusion   IntraVENous PRN    ondansetron (ZOFRAN-ODT) disintegrating tablet 4 mg  4 mg Oral Q8H PRN    Or    ondansetron (ZOFRAN) injection 4 mg  4 mg IntraVENous Q6H PRN    polyethylene glycol (GLYCOLAX) packet 17 g  17 g Oral Daily PRN    acetaminophen (TYLENOL) tablet 650 mg  650 mg Oral Q6H PRN    Or    acetaminophen (TYLENOL) suppository 650 mg  650 mg Rectal Q6H PRN    potassium chloride (KLOR-CON M) extended release tablet 40 mEq  40 mEq Oral  RBC 4.37 4.10 - 5.70 M/uL    Hemoglobin 10.7 (L) 12.1 - 17.0 g/dL    Hematocrit 33.2 (L) 36.6 - 50.3 %    MCV 76.0 (L) 80.0 - 99.0 FL    MCH 24.5 (L) 26.0 - 34.0 PG    MCHC 32.2 30.0 - 36.5 g/dL    RDW 15.2 (H) 11.5 - 14.5 %    Platelets 272 150 - 400 K/uL    Nucleated RBCs 0.0 0.0  WBC    nRBC 0.00 0.00 - 0.01 K/uL    Neutrophils % 52 32 - 75 %    Lymphocytes % 27 12 - 49 %    Monocytes % 12 5 - 13 %    Eosinophils % 7 0 - 7 %    Basophils % 1 0 - 1 %    Immature Granulocytes % 1 (H) 0 - 0.5 %    Neutrophils Absolute 2.9 1.8 - 8.0 K/UL    Lymphocytes Absolute 1.5 0.8 - 3.5 K/UL    Monocytes Absolute 0.6 0.0 - 1.0 K/UL    Eosinophils Absolute 0.4 0.0 - 0.4 K/UL    Basophils Absolute 0.0 0.0 - 0.1 K/UL    Immature Granulocytes Absolute 0.0 0.00 - 0.04 K/UL    Differential Type AUTOMATED     Phosphorus    Collection Time: 11/11/24  5:24 AM   Result Value Ref Range    Phosphorus 3.4 2.6 - 4.7 mg/dL   POCT Glucose    Collection Time: 11/11/24  8:01 AM   Result Value Ref Range    POC Glucose 132 (H) 65 - 100 mg/dL    Performed by: DORETHA ARRIOLA        No orders to display          Discussion/MDM:     [] High (any 2)    A. Problems (any 1)  [] Acute/Chronic Illness/injury posing threat to life or bodily function:    [] Severe exacerbation of chronic illness:    ---------------------------------------------------------------------  B. Risk of Treatment (any 1)   [] Drugs/treatments that require intensive monitoring for toxicity include:    [] IV ABX requiring serial renal monitoring for nephrotoxicity:     [] IV Narcotic analgesia for adverse drug reaction  [] Aggressive IV diuresis requiring serial monitoring for renal impairment and electrolyte derangements  [] Critical electrolyte abnormalities requiring IV replacement and close serial monitoring  [] SQ Insulin SS- monitoring serial FSBS for Hypoglycemic adverse drug reaction  [] Other -   [] Change in code status:    [] Decision to escalate care:    [] Major

## 2024-11-11 NOTE — PLAN OF CARE
Problem: Chronic Conditions and Co-morbidities  Goal: Patient's chronic conditions and co-morbidity symptoms are monitored and maintained or improved  11/10/2024 1532 by Carolina Dinh, RN  Outcome: Progressing  Flowsheets (Taken 11/10/2024 0839)  Care Plan - Patient's Chronic Conditions and Co-Morbidity Symptoms are Monitored and Maintained or Improved:   Monitor and assess patient's chronic conditions and comorbid symptoms for stability, deterioration, or improvement   Collaborate with multidisciplinary team to address chronic and comorbid conditions and prevent exacerbation or deterioration   Update acute care plan with appropriate goals if chronic or comorbid symptoms are exacerbated and prevent overall improvement and discharge     Problem: Discharge Planning  Goal: Discharge to home or other facility with appropriate resources  11/10/2024 1532 by Carolina Dinh, RN  Outcome: Progressing  Flowsheets (Taken 11/10/2024 0839)  Discharge to home or other facility with appropriate resources:   Identify barriers to discharge with patient and caregiver   Arrange for needed discharge resources and transportation as appropriate   Identify discharge learning needs (meds, wound care, etc)   Refer to discharge planning if patient needs post-hospital services based on physician order or complex needs related to functional status, cognitive ability or social support system     Problem: Safety - Adult  Goal: Free from fall injury  11/11/2024 0519 by Kamilla Ca RN  Outcome: Progressing  11/10/2024 1532 by Carolina Dinh, RN  Outcome: Progressing     Problem: Skin/Tissue Integrity  Goal: Absence of new skin breakdown  Description: 1.  Monitor for areas of redness and/or skin breakdown  2.  Assess vascular access sites hourly  3.  Every 4-6 hours minimum:  Change oxygen saturation probe site  4.  Every 4-6 hours:  If on nasal continuous positive airway pressure, respiratory therapy assess nares and

## 2024-11-11 NOTE — PROGRESS NOTES
[facility] at [address] [address]  Phone: [phone]      Progress Note      11/11/2024 11:19 AM  NAME: Author PRATEEK Williamson   MRN:  [id number]   Admit Diagnosis: Acute exacerbation of chronic heart failure (HCC) [I50.9]          Assessment/Plan:     Type II NSTEMI.  Continue Plavix, beta-blocker and nitrates Zetia for antianginal coverage.  Cardiac cath today by Dr. Catalan.  History of CAD with PTCA/RUBINA of LAD.  History of cardiomyopathy with baseline LV ejection fraction of 35% which now has deteriorated to 20-25%.  Continue GDMT with watch on electrolyte.  CKD         []       High complexity decision making was performed in this patient at high risk for decompensation with multiple organ involvement.    Subjective:     Author PRATEEK Williamson denies chest pain, dyspnea.  Discussed with RN events overnight.     Review of Systems:     []         Unable to obtain  ROS due to ---   [x]         Total of 12 systems reviewed as follows:     Constitutional: negative fever, negative chills, negative weight loss  Eyes:               negative visual changes  ENT:                negative sore throat, tongue or lip swelling  Respiratory:     negative cough, negative dyspnea  Cards:             As per HPI  GI:                   negative for nausea, vomiting, diarrhea, and abdominal pain  Genitourinary: negative for frequency, dysuria  Integument:     negative for rash   Hematologic:   negative for easy bruising and gum/nose bleeding  Musculoskel:   negative for myalgias,  back pain  Neurological:   negative for headaches, dizziness, vertigo, weakness  Behavl/Psych: negative for feelings of anxiety, depression     Objective:      Physical Exam:    Last 24hrs VS reviewed since prior progress note. Most recent are:    BP (!) 142/96   Pulse 69   Temp 98.2 °F (36.8 °C) (Oral)   Resp 18   Ht 1.803 m (5' 10.98\")   Wt 70.8 kg (156 lb 1.4 oz)   SpO2 99%   BMI 21.78 kg/m²  Panel    Collection Time: 11/11/24  5:24 AM   Result Value Ref Range    Sodium 139 136 - 145 mmol/L    Potassium 3.7 3.5 - 5.1 mmol/L    Chloride 106 97 - 108 mmol/L    CO2 25 21 - 32 mmol/L    Anion Gap 8 2 - 12 mmol/L    Glucose 148 (H) 65 - 100 mg/dL    BUN 45 (H) 6 - 20 mg/dL    Creatinine 1.76 (H) 0.70 - 1.30 mg/dL    BUN/Creatinine Ratio 26 (H) 12 - 20      Est, Glom Filt Rate 40 (L) >60 ml/min/1.73m2    Calcium 9.1 8.5 - 10.1 mg/dL   Magnesium    Collection Time: 11/11/24  5:24 AM   Result Value Ref Range    Magnesium 1.9 1.6 - 2.4 mg/dL   CBC with Auto Differential    Collection Time: 11/11/24  5:24 AM   Result Value Ref Range    WBC 5.5 4.1 - 11.1 K/uL    RBC 4.37 4.10 - 5.70 M/uL    Hemoglobin 10.7 (L) 12.1 - 17.0 g/dL    Hematocrit 33.2 (L) 36.6 - 50.3 %    MCV 76.0 (L) 80.0 - 99.0 FL    MCH 24.5 (L) 26.0 - 34.0 PG    MCHC 32.2 30.0 - 36.5 g/dL    RDW 15.2 (H) 11.5 - 14.5 %    Platelets 272 150 - 400 K/uL    Nucleated RBCs 0.0 0.0  WBC    nRBC 0.00 0.00 - 0.01 K/uL    Neutrophils % 52 32 - 75 %    Lymphocytes % 27 12 - 49 %    Monocytes % 12 5 - 13 %    Eosinophils % 7 0 - 7 %    Basophils % 1 0 - 1 %    Immature Granulocytes % 1 (H) 0 - 0.5 %    Neutrophils Absolute 2.9 1.8 - 8.0 K/UL    Lymphocytes Absolute 1.5 0.8 - 3.5 K/UL    Monocytes Absolute 0.6 0.0 - 1.0 K/UL    Eosinophils Absolute 0.4 0.0 - 0.4 K/UL    Basophils Absolute 0.0 0.0 - 0.1 K/UL    Immature Granulocytes Absolute 0.0 0.00 - 0.04 K/UL    Differential Type AUTOMATED     Phosphorus    Collection Time: 11/11/24  5:24 AM   Result Value Ref Range    Phosphorus 3.4 2.6 - 4.7 mg/dL   POCT Glucose    Collection Time: 11/11/24  8:01 AM   Result Value Ref Range    POC Glucose 132 (H) 65 - 100 mg/dL    Performed by: DORETHA ARRIOLA           Cardiac cath:    No results found for this or any previous visit.       Echo:     11/08/24    ECHO (TTE) LIMITED (PRN CONTRAST/BUBBLE/STRAIN/3D) 11/08/2024  2:28 PM (Final)    Interpretation Summary     Left Ventricle: Reduced left ventricular systolic function with a visually estimated EF of 20 - 25%. Left ventricle size is normal. Increased wall thickness. Severe global hypokinesis present. Abnormal diastolic function.    Right Ventricle: Mildly reduced systolic function. TAPSE is abnormal. TAPSE is 1.5 cm. RV Peak S' is 12.1 cm/s.    Mitral Valve: Mild regurgitation.    Tricuspid Valve: Mild to moderate regurgitation with an eccentrically directed jet and may underestimate severity.    Left Atrium: Left atrium is dilated. Left atrial volume index is mildly increased (35-41 mL/m2).    Aorta: Normal sized aortic root. Dilated ascending aorta. Ao ascending diameter is 3.7 cm.    Pericardium: Small (<1 cm) pericardial effusion present. No indication of cardiac tamponade.    Image quality is fair. Contrast used: Definity.    Signed by: Yuliet Berman MD on 11/8/2024  2:28 PM       Patient's  EKG, laboratory data and echocardiogram were individually reviewed by me.      Lab Data:    Recent Labs     11/10/24  0536 11/11/24  0524   WBC 4.5 5.5   HGB 10.1* 10.7*   HCT 31.8* 33.2*    272     No results for input(s): \"INR\", \"APTT\" in the last 72 hours.    Invalid input(s): \"PTP\"   Recent Labs     11/09/24 0059 11/10/24  0536 11/11/24  0524    141 139   K 3.8 3.7 3.7    107 106   CO2 28 26 25   BUN 42* 47* 45*   MG 1.8 1.7 1.9     No results for input(s): \"CPK\" in the last 72 hours.    Invalid input(s): \"CPKMB\", \"CKNDX\", \"TROIQ\"  Lab Results   Component Value Date/Time    CHOL 111 11/09/2024 12:59 AM    HDL 64 11/09/2024 12:59 AM    LDL 35.6 11/09/2024 12:59 AM       Recent Labs     11/09/24  0059   GLOB 4.5*     No results for input(s): \"PH\", \"PCO2\", \"PO2\" in the last 72 hours.    Medications Personally Reviewed:    Current Facility-Administered Medications   Medication Dose Route Frequency    sodium chloride flush 0.9 % injection 5-40 mL  5-40 mL IntraVENous PRN    0.9 % sodium chloride infusion

## 2024-11-12 VITALS
TEMPERATURE: 97.7 F | HEART RATE: 60 BPM | OXYGEN SATURATION: 100 % | SYSTOLIC BLOOD PRESSURE: 119 MMHG | RESPIRATION RATE: 18 BRPM | DIASTOLIC BLOOD PRESSURE: 69 MMHG | WEIGHT: 156.53 LBS | HEIGHT: 71 IN | BODY MASS INDEX: 21.91 KG/M2

## 2024-11-12 LAB
ANION GAP SERPL CALC-SCNC: 7 MMOL/L (ref 2–12)
BASOPHILS # BLD: 0 K/UL (ref 0–0.1)
BASOPHILS NFR BLD: 0 % (ref 0–1)
BNP SERPL-MCNC: ABNORMAL PG/ML
BUN SERPL-MCNC: 48 MG/DL (ref 6–20)
BUN/CREAT SERPL: 24 (ref 12–20)
CA-I BLD-MCNC: 8.9 MG/DL (ref 8.5–10.1)
CHLORIDE SERPL-SCNC: 106 MMOL/L (ref 97–108)
CO2 SERPL-SCNC: 26 MMOL/L (ref 21–32)
CREAT SERPL-MCNC: 2.01 MG/DL (ref 0.7–1.3)
DIFFERENTIAL METHOD BLD: ABNORMAL
EKG ATRIAL RATE: 59 BPM
EKG DIAGNOSIS: NORMAL
EKG P AXIS: 6 DEGREES
EKG P-R INTERVAL: 208 MS
EKG Q-T INTERVAL: 594 MS
EKG QRS DURATION: 130 MS
EKG QTC CALCULATION (BAZETT): 588 MS
EKG R AXIS: -34 DEGREES
EKG T AXIS: 145 DEGREES
EKG VENTRICULAR RATE: 59 BPM
EOSINOPHIL # BLD: 0.3 K/UL (ref 0–0.4)
EOSINOPHIL NFR BLD: 6 % (ref 0–7)
ERYTHROCYTE [DISTWIDTH] IN BLOOD BY AUTOMATED COUNT: 15.8 % (ref 11.5–14.5)
GLUCOSE BLD STRIP.AUTO-MCNC: 137 MG/DL (ref 65–100)
GLUCOSE BLD STRIP.AUTO-MCNC: 260 MG/DL (ref 65–100)
GLUCOSE SERPL-MCNC: 150 MG/DL (ref 65–100)
HCT VFR BLD AUTO: 32.9 % (ref 36.6–50.3)
HGB BLD-MCNC: 10.4 G/DL (ref 12.1–17)
IMM GRANULOCYTES # BLD AUTO: 0 K/UL (ref 0–0.04)
IMM GRANULOCYTES NFR BLD AUTO: 1 % (ref 0–0.5)
LYMPHOCYTES # BLD: 1.5 K/UL (ref 0.8–3.5)
LYMPHOCYTES NFR BLD: 34 % (ref 12–49)
MAGNESIUM SERPL-MCNC: 2 MG/DL (ref 1.6–2.4)
MCH RBC QN AUTO: 24.2 PG (ref 26–34)
MCHC RBC AUTO-ENTMCNC: 31.6 G/DL (ref 30–36.5)
MCV RBC AUTO: 76.5 FL (ref 80–99)
MONOCYTES # BLD: 0.6 K/UL (ref 0–1)
MONOCYTES NFR BLD: 14 % (ref 5–13)
NEUTS SEG # BLD: 2.1 K/UL (ref 1.8–8)
NEUTS SEG NFR BLD: 45 % (ref 32–75)
NRBC # BLD: 0 K/UL (ref 0–0.01)
NRBC BLD-RTO: 0 PER 100 WBC
PERFORMED BY:: ABNORMAL
PERFORMED BY:: ABNORMAL
PHOSPHATE SERPL-MCNC: 3.8 MG/DL (ref 2.6–4.7)
PLATELET # BLD AUTO: 180 K/UL (ref 150–400)
POTASSIUM SERPL-SCNC: 3.6 MMOL/L (ref 3.5–5.1)
RBC # BLD AUTO: 4.3 M/UL (ref 4.1–5.7)
SODIUM SERPL-SCNC: 139 MMOL/L (ref 136–145)
WBC # BLD AUTO: 4.5 K/UL (ref 4.1–11.1)

## 2024-11-12 PROCEDURE — 82962 GLUCOSE BLOOD TEST: CPT

## 2024-11-12 PROCEDURE — 80048 BASIC METABOLIC PNL TOTAL CA: CPT

## 2024-11-12 PROCEDURE — 83880 ASSAY OF NATRIURETIC PEPTIDE: CPT

## 2024-11-12 PROCEDURE — 85025 COMPLETE CBC W/AUTO DIFF WBC: CPT

## 2024-11-12 PROCEDURE — 84100 ASSAY OF PHOSPHORUS: CPT

## 2024-11-12 PROCEDURE — 83735 ASSAY OF MAGNESIUM: CPT

## 2024-11-12 PROCEDURE — 6370000000 HC RX 637 (ALT 250 FOR IP)

## 2024-11-12 PROCEDURE — 36415 COLL VENOUS BLD VENIPUNCTURE: CPT

## 2024-11-12 PROCEDURE — 6370000000 HC RX 637 (ALT 250 FOR IP): Performed by: INTERNAL MEDICINE

## 2024-11-12 PROCEDURE — 6360000002 HC RX W HCPCS

## 2024-11-12 RX ORDER — HYDRALAZINE HYDROCHLORIDE 50 MG/1
50 TABLET, FILM COATED ORAL 3 TIMES DAILY
Qty: 90 TABLET | Refills: 3 | Status: SHIPPED | OUTPATIENT
Start: 2024-11-12 | End: 2025-11-13

## 2024-11-12 RX ORDER — SPIRONOLACTONE 25 MG/1
25 TABLET ORAL DAILY
Status: DISCONTINUED | OUTPATIENT
Start: 2024-11-12 | End: 2024-11-12 | Stop reason: HOSPADM

## 2024-11-12 RX ORDER — SPIRONOLACTONE 25 MG/1
25 TABLET ORAL DAILY
Qty: 30 TABLET | Refills: 3 | Status: SHIPPED | OUTPATIENT
Start: 2024-11-12

## 2024-11-12 RX ORDER — FUROSEMIDE 40 MG/1
40 TABLET ORAL 2 TIMES DAILY
Qty: 60 TABLET | Refills: 3 | Status: SHIPPED | OUTPATIENT
Start: 2024-11-12 | End: 2025-11-12

## 2024-11-12 RX ADMIN — ASPIRIN 81 MG: 81 TABLET, COATED ORAL at 09:24

## 2024-11-12 RX ADMIN — EZETIMIBE 10 MG: 10 TABLET ORAL at 09:25

## 2024-11-12 RX ADMIN — SPIRONOLACTONE 25 MG: 25 TABLET ORAL at 13:39

## 2024-11-12 RX ADMIN — HYDRALAZINE HYDROCHLORIDE 50 MG: 50 TABLET ORAL at 13:39

## 2024-11-12 RX ADMIN — EMPAGLIFLOZIN 10 MG: 10 TABLET, FILM COATED ORAL at 13:39

## 2024-11-12 RX ADMIN — HYDRALAZINE HYDROCHLORIDE 50 MG: 50 TABLET ORAL at 09:25

## 2024-11-12 RX ADMIN — CARVEDILOL 12.5 MG: 12.5 TABLET, FILM COATED ORAL at 09:24

## 2024-11-12 RX ADMIN — INSULIN LISPRO 2 UNITS: 100 INJECTION, SOLUTION INTRAVENOUS; SUBCUTANEOUS at 12:31

## 2024-11-12 RX ADMIN — CLOPIDOGREL BISULFATE 75 MG: 75 TABLET ORAL at 09:25

## 2024-11-12 RX ADMIN — FUROSEMIDE 40 MG: 10 INJECTION, SOLUTION INTRAMUSCULAR; INTRAVENOUS at 09:29

## 2024-11-12 RX ADMIN — ISOSORBIDE MONONITRATE 120 MG: 60 TABLET, EXTENDED RELEASE ORAL at 09:25

## 2024-11-12 ASSESSMENT — PAIN SCALES - GENERAL
PAINLEVEL_OUTOF10: 0
PAINLEVEL_OUTOF10: 0

## 2024-11-12 NOTE — PROGRESS NOTES
Discharge instructions provided, family members at bedside. Instructions included follow up appts, medications, uses, and side effects. Post cath site care instructions provided. Verbalized understanding of all instructions. Awaiting case management regarding home health before discharge

## 2024-11-12 NOTE — PROGRESS NOTES
Progress Note      11/12/2024 12:01 PM  NAME: Author PRATEEK Williamson   MRN:  [id number]   Admit Diagnosis: Acute exacerbation of chronic heart failure (HCC) [I50.9]      Problem List:   Type II non-Q wave MI-->PCI of RCA yesterday  CAD status post LAD stent  Cardiomyopathy with EF 35%-->now 20-25%  Hypertension  CKD  Type 2 diabetes     Assessment/Plan:   Continue DAPT  Outpatient evaluation for ICD  Guideline directed medical therapy for CHF  Stable for discharge  Follow-up in 2-3 weeks with cards         []       High complexity decision making was performed in this patient at high risk for decompensation with multiple organ involvement.    Subjective:     Author PRATEEK Williamson alert and awake.  Discussed with RN events overnight.     Review of Systems:   Negative except for as noted above.    Objective:      Physical Exam:    Last 24hrs VS reviewed since prior progress note. Most recent are:    /69   Pulse 60   Temp 97.7 °F (36.5 °C) (Oral)   Resp 18   Ht 1.803 m (5' 10.98\")   Wt 71 kg (156 lb 8.4 oz)   SpO2 100%   BMI 21.84 kg/m²     Intake/Output Summary (Last 24 hours) at 11/12/2024 1201  Last data filed at 11/12/2024 0934  Gross per 24 hour   Intake 240 ml   Output 160 ml   Net 80 ml        General Appearance:  no acute distress.  Ears/Nose/Mouth/Throat: moist mucous membranes  Neck: Supple.  Chest: Lungs clear to auscultation bilaterally.  Cardiovascular: Regular rate and rhythm, S1S2 normal  Abdomen: Soft, non-tender, bowel sounds are active.  Extremities: No edema bilaterally.  Skin: Warm and dry.      PMH/SH reviewed - no change compared to H&P    Telemetry: NSR      []  No new EKG for review    Lab Data Personally Reviewed:    Recent Labs     11/11/24 0524 11/12/24 0150   WBC 5.5 4.5   HGB 10.7* 10.4*   HCT 33.2* 32.9*    180     No results for input(s): \"INR\", \"APTT\" in the last 72 hours.    Invalid input(s): \"PTP\"     Recent Labs     11/10/24  0536 11/11/24 0524 11/12/24  0150   NA  141 139 139   K 3.7 3.7 3.6    106 106   CO2 26 25 26   BUN 47* 45* 48*   MG 1.7 1.9 2.0     No results for input(s): \"CPK\" in the last 72 hours.    Invalid input(s): \"CPKMB\", \"CKNDX\", \"TROIQ\"  Lab Results   Component Value Date/Time    CHOL 111 11/09/2024 12:59 AM    HDL 64 11/09/2024 12:59 AM    LDL 35.6 11/09/2024 12:59 AM       No results for input(s): \"TP\", \"GLOB\", \"GGT\" in the last 72 hours.    Invalid input(s): \"SGOT\", \"GPT\", \"AP\", \"TBIL\", \"ALB\", \"AML\", \"AMYP\", \"LPSE\", \"HLPSE\"    No results for input(s): \"PH\", \"PCO2\", \"PO2\" in the last 72 hours.    Medications Personally Reviewed:    Current Facility-Administered Medications   Medication Dose Route Frequency    acetaminophen (TYLENOL) tablet 650 mg  650 mg Oral Q4H PRN    aspirin EC tablet 81 mg  81 mg Oral Daily    clopidogrel (PLAVIX) tablet 75 mg  75 mg Oral Daily    sodium chloride flush 0.9 % injection 5-40 mL  5-40 mL IntraVENous PRN    0.9 % sodium chloride infusion   IntraVENous PRN    ondansetron (ZOFRAN-ODT) disintegrating tablet 4 mg  4 mg Oral Q8H PRN    Or    ondansetron (ZOFRAN) injection 4 mg  4 mg IntraVENous Q6H PRN    polyethylene glycol (GLYCOLAX) packet 17 g  17 g Oral Daily PRN    acetaminophen (TYLENOL) suppository 650 mg  650 mg Rectal Q6H PRN    potassium chloride (KLOR-CON M) extended release tablet 40 mEq  40 mEq Oral PRN    Or    potassium bicarb-citric acid (EFFER-K) effervescent tablet 40 mEq  40 mEq Oral PRN    Or    potassium chloride 10 mEq/100 mL IVPB (Peripheral Line)  10 mEq IntraVENous PRN    magnesium sulfate 2000 mg in 50 mL IVPB premix  2,000 mg IntraVENous PRN    carvedilol (COREG) tablet 12.5 mg  12.5 mg Oral BID    ezetimibe (ZETIA) tablet 10 mg  10 mg Oral Daily    hydrALAZINE (APRESOLINE) tablet 50 mg  50 mg Oral TID    isosorbide mononitrate (IMDUR) extended release tablet 120 mg  120 mg Oral Daily    albuterol sulfate HFA (PROVENTIL;VENTOLIN;PROAIR) 108 (90 Base) MCG/ACT inhaler 2 puff  2 puff Inhalation 4x

## 2024-11-12 NOTE — DISCHARGE INSTRUCTIONS
IMPORTANT    People who undergo angioplasty and/or stent placement procedures are prescribed aspirin along with certain medications called anticlotting or antiplatelet medications. These medications include: Plavix (clopidogrel), Effient (prasugrel), and Brilinta (ticagrelor). It is important to take the aspirin and the anticlotting medication that you were prescribed every day in order to reduce the probability that the stent will become filled with blood clot. Angioplasty and/or stent placement procedures are done so that a blocked artery can be opened. If the stent becomes filled with blood clot, the artery becomes blocked off again. This can result in a heart attack, or even death.    It is extremely important to take all the medicines prescribed by your cardiologist exactly as they were prescribed. Failure to take certain medications - particularly aspirin along with Plavix (clopidogrel),  Effient (prasugrel), or Brilinta(ticagrelor) - can result in a heart attack, or even death. Do not miss any doses. It is vital that aspirin along with Plavix, Effient, or Brilinta be taken every single day. If you have any questions or concerns regarding your medications, please consult your cardiologist. He/she is the only person who can adjust or stop these medications.    You must fill the prescription for these medications immediately upon discharge so that you do not miss any doses. If you cannot afford the medication prescribed to you, please let your cardiologist know immediately.  _____________________________________________________________________________________________________________________________________________________________________    Your cardiologist has ordered cardiac rehabilitation for you as part of your recovery process. Cardiac rehab is a very important way to help you to feel better and stronger more quickly as well as reduce the risks of heart problems in the future.    Cardiac rehabilitation  services are provided at:  [facility]  [facility]  [address] [place] [address]  [phone]    Your information has been forwarded to the staff at this facility.    Someone will call you to schedule an initial assessment to begin cardiac rehabilitation.

## 2024-11-12 NOTE — CARE COORDINATION
CM met with patient and spouse in room, requesting HH, no preference for HH provider, accepted with [facility], SOC 11-13-24.    Patient clear to d/c from  to home, nurse notified.    Transition of Care Plan:    RUR: 13%  Prior Level of Functioning: independent  Disposition: home with HH  KAUSHAL:   If SNF or IPR: Date FOC offered:   Date FOC received:   Accepting facility:   Date authorization started with reference number:   Date authorization received and expires:   Follow up appointments:   DME needed:   Transportation at discharge: family  IM/IMM Medicare/ letter given: yes  Is patient a [place] and connected with [facility]?    If yes, was  transfer form completed and [facility] notified?   Caregiver Contact:   Discharge Caregiver contacted prior to discharge? Patient and spouse notified  Care Conference needed?   Barriers to discharge: n/a

## 2024-11-12 NOTE — DISCHARGE SUMMARY
Physician Discharge Summary     Patient ID:    Author PRATEEK Williamson  [id number]  74 y.o.  1950    Admit date: 11/8/2024    Discharge date : 11/12/2024      Final Diagnoses:   Acute exacerbation of chronic heart failure (HCC) [I50.9]      Reason for Hospitalization: NSTEMi    Hospital Course:   74-year-old male with PMH significant for CHF, CKD stage III, T2DM, HLD, HTN presented to ED for shortness of breath and productive cough. Patient was transferred from [facility] for NSTEMI and heart failure exacerbation. Patient stated his symptoms started few days ago with worsening shortness of breath with activity. Denies chest pain/fever/chills. Chest x-ray remarkable for bilateral interstitial opacities in mid and lower lungs favored pulmonary interstitial edema, lab workup significant for BNP greater than 32,000, troponin 1655, creatinine 1.95, BUN 43, hemoglobin 9.7, WBC 10.2. Patient has been admitted for further workup     CHF exacerbation  NSTEMI s/p cardiac cath on 11/11 with RCA PCI  BNP 32K  Chest x-ray bilateral interstitial obesities mid and lower lungs  Troponin 1022 <1655  4/24 echo EF 30-35%  4/24 negative for amyloidosis by technetium scan  Left heart cath today 11/11 with RCA stenting  Continue GDMT on discharge with Coreg, Jardiance, hydralazine and spironolactone.  Unable to do ACE or ARB because of renal function uptitrate outpatient.  Continue DAPT and Zetia, continue Imdur  Increase Lasix to twice daily outpatient until he sees cardiology  Outpatient evaluation for ICD per cardiology     CKD stage IIIb  Creatinine/eGFR 1.80/39  Continue to monitor     Microcytic anemia  H/H 10.7/33.2     Hypertension  Hyperlipidemia  CAD  T2DM  Home meds resumed  Monitor      Discharge Medications:      Medication List        START taking these medications      empagliflozin 10 MG tablet  Commonly known as: JARDIANCE  Take 1 tablet by mouth daily     spironolactone 25 MG

## 2024-11-12 NOTE — PROGRESS NOTES
Information about the importance of aspirin and antiplatelet compliance and outpatient cardiac rehab will be included with the patient's printed discharge instructions. Patient was also provided with a folder containing this information from the cath lab staff after the procedure.      An outpatient cardiac referral has been made to [facility]. Patient's information was forwarded to this facility. Patient will be contacted by this facility to schedule an initial appointment. This referral information has been included in the patient's discharge instructions. Contact information for cardiac rehab facility was provided as well.

## 2024-11-12 NOTE — PLAN OF CARE
Problem: Chronic Conditions and Co-morbidities  Goal: Patient's chronic conditions and co-morbidity symptoms are monitored and maintained or improved  Outcome: Progressing     Problem: Discharge Planning  Goal: Discharge to home or other facility with appropriate resources  Outcome: Progressing     Problem: Safety - Adult  Goal: Free from fall injury  Outcome: Progressing     Problem: Skin/Tissue Integrity  Goal: Absence of new skin breakdown  Description: 1.  Monitor for areas of redness and/or skin breakdown  2.  Assess vascular access sites hourly  3.  Every 4-6 hours minimum:  Change oxygen saturation probe site  4.  Every 4-6 hours:  If on nasal continuous positive airway pressure, respiratory therapy assess nares and determine need for appliance change or resting period.  Outcome: Progressing     Problem: Pain  Goal: Verbalizes/displays adequate comfort level or baseline comfort level  Outcome: Progressing  Flowsheets (Taken 11/12/2024 0803)  Verbalizes/displays adequate comfort level or baseline comfort level:   Encourage patient to monitor pain and request assistance   Assess pain using appropriate pain scale   Administer analgesics based on type and severity of pain and evaluate response   Implement non-pharmacological measures as appropriate and evaluate response   Consider cultural and social influences on pain and pain management   Notify Licensed Independent Practitioner if interventions unsuccessful or patient reports new pain

## 2024-11-13 LAB
ACT BLD: 287 SEC (ref 74–125)
PERFORMED BY:: ABNORMAL

## (undated) DEVICE — CATHETER GUID 6FR L100CM COR PERIPH BLU NYL COAT PTFE LNR 67000400] CARDINAL HEALTH CORDIS]

## (undated) DEVICE — SYRINGE MED 10ML RED POLYCARB BRL FIX M LUER CONN FLAT GRP

## (undated) DEVICE — RADIFOCUS GLIDEWIRE: Brand: GLIDEWIRE

## (undated) DEVICE — GUIDEWIRE VASC L260CM DIA0.035IN TIP L3MM STD EXCHG PTFE J

## (undated) DEVICE — RADIFOCUS OPTITORQUE ANGIOGRAPHIC CATHETER: Brand: OPTITORQUE

## (undated) DEVICE — CATHETER ETER DIAG 6FR 100CML 0038IN VASC JUDKINS R 4 SB COR W O

## (undated) DEVICE — RUNTHROUGH NS EXTRA FLOPPY PTCA GUIDEWIRE: Brand: RUNTHROUGH

## (undated) DEVICE — CATHETER DIAG 5FR L100CM LUMN ID0.047IN JL4 CRV 0 SIDE H

## (undated) DEVICE — LULU RADIAL/FEMORAL ANGIOGRAPHY SHEET: Brand: CONVERTORS

## (undated) DEVICE — GLIDESHEATH SLENDER STAINLESS STEEL KIT: Brand: GLIDESHEATH SLENDER

## (undated) DEVICE — DRAPE,APERTURE,MINOR PROCEDURE: Brand: MEDLINE

## (undated) DEVICE — DEVICE INFL W/ HEM VLV TORQ

## (undated) DEVICE — 3M™ TEGADERM™ TRANSPARENT FILM DRESSING FRAME STYLE WITH BORDER, 1616, 4 IN X 4-3/4 IN (10 CM X 12 CM), 50/CT 4CT/CASE: Brand: 3M™ TEGADERM™

## (undated) DEVICE — SYRINGE MED 10ML PUR GAM COMPATIBLE POLYCARB FIX M LUER CONN

## (undated) DEVICE — PINNACLE INTRODUCER SHEATH: Brand: PINNACLE

## (undated) DEVICE — TRAY SURG CUST CRD CATH 3 PRT SSR

## (undated) DEVICE — BAND COMPR L24CM REG CLR PLAS HEMSTAT EXT HK AND LOOP RETEN

## (undated) DEVICE — CATHETER DIAG 5FR L100CM LUMN ID0.047IN JR4 CRV 0 SIDE H

## (undated) DEVICE — WASTEBAG DRIP/ADAPTER: Brand: MEDLINE INDUSTRIES, INC.

## (undated) DEVICE — Device

## (undated) DEVICE — CATHETER GUID 6FR DIA0.071IN SHFT NYL STD L JR 4 CRV ENH

## (undated) DEVICE — CATH BLLN ANGIO 2X15MM SC EUPHORA RX

## (undated) DEVICE — CATH DIAG WR5 EXPO 5FRX100CM -- EXPO

## (undated) DEVICE — PERCLOSE PROGLIDE™ SUTURE-MEDIATED CLOSURE SYSTEM: Brand: PERCLOSE PROGLIDE™

## (undated) DEVICE — GUIDEWIRE VASC L185CM DIA0.014IN HYDRPHLC PTFE STR TIP

## (undated) DEVICE — GLIDESHEATH SLENDER ACCESS KIT: Brand: GLIDESHEATH SLENDER

## (undated) DEVICE — CATHETER ANGIO 5FR L100CM GRY S STL NYL JR4 3 SEG BRAID L

## (undated) DEVICE — HI-TORQUE WHISPER MS GUIDE WIRE .014 J TIP 3.0 CM X 190 CM: Brand: HI-TORQUE WHISPER

## (undated) DEVICE — SURGICAL PROCEDURE TRAY CRD CATH 3 PRT